# Patient Record
Sex: MALE | Race: WHITE | NOT HISPANIC OR LATINO | Employment: OTHER | URBAN - METROPOLITAN AREA
[De-identification: names, ages, dates, MRNs, and addresses within clinical notes are randomized per-mention and may not be internally consistent; named-entity substitution may affect disease eponyms.]

---

## 2017-01-09 ENCOUNTER — TELEPHONE (OUTPATIENT)
Dept: OTHER | Facility: CLINIC | Age: 72
End: 2017-01-09

## 2017-01-09 NOTE — TELEPHONE ENCOUNTER
Pt called having pain in left calf.  Similar pain as before, however more an ache.  Different from pre-procedure.  Concerned, worried that it may be vascular. Does a fair amount of walking, does seemed to worsen after walking a significant amount of time.  December ultrasound was normal.  Still on Plavix and ASA.  Will repeat imaging, if again it's normal will recommend musculoskeletal consult.  Will schedule within the week, and call patient with results.  Judy Sanz RN  IR nurse clinician

## 2017-01-10 NOTE — TELEPHONE ENCOUNTER
I called Gilbert to schedule his testing for this week. I offered him time on Wednesday and Thursday and he declined any options this week as he states he is already too busy.  He requested that I look at opening for next week.  I have him scheduled on Monday, January 16th, per his request. Jessica Navarro,

## 2017-01-16 ENCOUNTER — HOSPITAL ENCOUNTER (OUTPATIENT)
Dept: ULTRASOUND IMAGING | Facility: CLINIC | Age: 72
Discharge: HOME | End: 2017-01-16
Attending: RADIOLOGY | Admitting: RADIOLOGY
Payer: MEDICARE

## 2017-01-16 ENCOUNTER — TELEPHONE (OUTPATIENT)
Dept: OTHER | Facility: CLINIC | Age: 72
End: 2017-01-16

## 2017-01-16 DIAGNOSIS — I73.9 PAD (PERIPHERAL ARTERY DISEASE) (H): ICD-10-CM

## 2017-01-16 DIAGNOSIS — I73.9 PAD (PERIPHERAL ARTERY DISEASE) (H): Primary | ICD-10-CM

## 2017-01-16 PROCEDURE — 93924 LWR XTR VASC STDY BILAT: CPT

## 2017-01-16 NOTE — TELEPHONE ENCOUNTER
Called patient with results.  Normal study.  Recommend f/u in June 2017.  Will repeat imaging at see patient.  [facility] will call to schedule.  Pt agrees to plan.  Denies questions at this time.  Judy Sanz RN  IR nurse clinician  US LOWER EXTREMITY ARTERIAL DUPLEX LEFT  1/16/2017 11:05 AM       HISTORY:  71-year-old male status post angioplasty of the tibial  peroneal trunk.     COMPARISON: Angiogram dated 11/18/2016.     FINDINGS: Spectral waveform analysis was performed.       The tibial peroneal trunk is patent without evidence for significant  stenosis.     EUGENE BELL MD  Exam Information      Exam Date Exam Time Accession # Performing Department Results      1/16/17 11:05 AM [id number] [facility] Ultrasound        PACS Images      Show images for US LILLI Doppler with Exercise       Study Result      US LILLI DOPPLER WITH EXERCISE  1/16/2017 11:05 AM       HISTORY: s/p PTA left tibial peroneal trunk done on 11/18/2016 due to  life limiting left lower extremity claudication symptoms.     COMPARISON: Angiogram dated 11/18/2016, ankle-brachial indices dated  9/22/2016 and 12/7/2016.     FINDINGS:    The patient walked on a treadmill for 5 minutes at a 10% incline and  at a speed of 2.0 miles per hour     Patient did not experience lower extremity claudication symptoms with  exercise.     The resting and exercise ABIs on the right are 1.19 and 1.28  respectively.     The resting and exercise ABIs on the left are  1.24 and 1.20  respectively.     Waveform analysis indicates multiphasic waveforms in the distal tibial  arteries.                                                                       IMPRESSION: Findings do not indicate significant lower extremity  arterial insufficiency at rest or with exercise.        EUGENE BELL MD

## 2017-02-16 ENCOUNTER — HOSPITAL ENCOUNTER (OUTPATIENT)
Facility: CLINIC | Age: 72
End: 2017-02-16
Attending: OPHTHALMOLOGY | Admitting: OPHTHALMOLOGY
Payer: MEDICARE

## 2017-05-15 ENCOUNTER — OFFICE VISIT (OUTPATIENT)
Dept: OTHER | Facility: CLINIC | Age: 72
End: 2017-05-15
Attending: RADIOLOGY
Payer: MEDICARE

## 2017-05-15 ENCOUNTER — HOSPITAL ENCOUNTER (OUTPATIENT)
Dept: ULTRASOUND IMAGING | Facility: CLINIC | Age: 72
End: 2017-05-15
Attending: RADIOLOGY
Payer: MEDICARE

## 2017-05-15 ENCOUNTER — HOSPITAL ENCOUNTER (OUTPATIENT)
Dept: ULTRASOUND IMAGING | Facility: CLINIC | Age: 72
Discharge: HOME | End: 2017-05-15
Attending: RADIOLOGY | Admitting: RADIOLOGY
Payer: MEDICARE

## 2017-05-15 VITALS — HEART RATE: 51 BPM | DIASTOLIC BLOOD PRESSURE: 80 MMHG | SYSTOLIC BLOOD PRESSURE: 126 MMHG

## 2017-05-15 DIAGNOSIS — I73.9 PAD (PERIPHERAL ARTERY DISEASE) (H): ICD-10-CM

## 2017-05-15 DIAGNOSIS — I73.9 PAD (PERIPHERAL ARTERY DISEASE) (H): Primary | ICD-10-CM

## 2017-05-15 PROCEDURE — 93926 LOWER EXTREMITY STUDY: CPT | Mod: LT

## 2017-05-15 PROCEDURE — 93924 LWR XTR VASC STDY BILAT: CPT

## 2017-05-15 NOTE — NURSING NOTE
"Chief Complaint   Patient presents with     RECHECK     Left leg arterial imaging and LILLI w/exercise (VHC 2:30; FAS 3:30) *s/p left tibial peroneal trunk PTA done on 11/18/2016 with Dr. Lundy. 6mo f/u from comparison study 1/16/2017 evaluate for pt to discontinue plavix       Initial /80 (BP Location: Left arm, Patient Position: Chair, Cuff Size: Adult Large)  Pulse 51 Estimated body mass index is 28.17 kg/(m^2) as calculated from the following:    Height as of 11/18/16: 5' 10\" (1.778 m).    Weight as of 11/18/16: 196 lb 4.8 oz (89 kg).  Medication Reconciliation: complete     Face to face nursing time: 8 minutes    Brittani Cortes MA     "

## 2017-05-15 NOTE — MR AVS SNAPSHOT
"              After Visit Summary   5/15/2017    Gilbert Ferrer    MRN: [id number]           Patient Information     Date Of Birth          1945        Visit Information        Provider Department      5/15/2017 3:30 PM James Lundy MD [facility]        Today's Diagnoses     PAD (peripheral artery disease) (H)    -  1       Follow-ups after your visit        Future tests that were ordered for you today     Open Future Orders        Priority Expected Expires Ordered    US LILLI Doppler with Exercise Routine 11/20/2017 5/16/2018 5/16/2017    US Lower Extremity Arterial Duplex Left Routine 11/20/2017 5/16/2018 5/16/2017            Who to contact     If you have questions or need follow up information about today's clinic visit or your schedule please contact [facility] directly at [phone].  Normal or non-critical lab and imaging results will be communicated to you by MyChart, letter or phone within 4 business days after the clinic has received the results. If you do not hear from us within 7 days, please contact the clinic through MyChart or phone. If you have a critical or abnormal lab result, we will notify you by phone as soon as possible.  Submit refill requests through [employer] or call your pharmacy and they will forward the refill request to us. Please allow 3 business days for your refill to be completed.          Additional Information About Your Visit        MyChart Information     [employer] lets you send messages to your doctor, view your test results, renew your prescriptions, schedule appointments and more. To sign up, go to [email or web address] . Click on \"Log in\" on the left side of the screen, which will take you to the Welcome page. Then click on \"Sign up Now\" on the right side of the page.     You will be asked to enter the access code listed below, as well as some personal information. Please follow the directions to create your " username and password.     Your access code is: [email or web address]  Expires: 2017  1:28 PM     Your access code will  in 90 days. If you need help or a new code, please call your Mobile clinic or [phone].        Care EveryWhere ID     This is your Care EveryWhere ID. This could be used by other organizations to access your Mobile medical records  [id number]        Your Vitals Were     Pulse                   51            Blood Pressure from Last 3 Encounters:   05/15/17 126/80   16 137/78   16 121/76    Weight from Last 3 Encounters:   16 196 lb 4.8 oz (89 kg)   16 193 lb 6 oz (87.7 kg)   02/15/16 192 lb (87.1 kg)               Primary Care Provider Office Phone # Fax #    Erica Calloway [phone]       GIANNA DONNELLY [address]  [facility] [address]        Thank you!     Thank you for choosing [facility]  for your care. Our goal is always to provide you with excellent care. Hearing back from our patients is one way we can continue to improve our services. Please take a few minutes to complete the written survey that you may receive in the mail after your visit with us. Thank you!             Your Updated Medication List - Protect others around you: Learn how to safely use, store and throw away your medicines at www.disposemymeds.org.          This list is accurate as of: 5/15/17 11:59 PM.  Always use your most recent med list.                   Brand Name Dispense Instructions for use    aspirin 81 MG EC tablet     90 tablet    Take 1 tablet (81 mg) by mouth daily       atorvastatin 40 MG tablet    LIPITOR    90 tablet    Take 1 tablet (40 mg) by mouth daily       clopidogrel 75 MG tablet    PLAVIX    90 tablet    Take 1 tablet (75 mg) by mouth daily Start taking medication the day after the procedure       pantoprazole 20 MG EC tablet    PROTONIX    90 tablet    Take by mouth 30-60 minutes before a meal.

## 2017-05-16 DIAGNOSIS — I73.9 PAD (PERIPHERAL ARTERY DISEASE) (H): Primary | ICD-10-CM

## 2017-05-16 NOTE — PATIENT INSTRUCTIONS
Repeat imaging and consult in 6 mo.  Start baby ASA 81mg.  Contact us sooner if sudden temperature change in foot, a sore develops or pain.   Increase walking tolerance.   Discontinue Plavix

## 2017-05-16 NOTE — PROGRESS NOTES
May 15, 2017       Erica Bennett    [address], [address]   [place] [address]      Dear Dr. Calloway,      I saw Gilbert [facility]ueger [facility] today.  He is a 71-year-old man who presented to us on 11/18/2016 due to left lower extremity claudication symptoms.  He underwent an angiogram.  On angiography, a significant stenosis was identified in the left tibioperoneal trunk.  This was treated with angioplasty.  No other significant lesions were identified.  The patient primarily had a single vessel runoff to the left foot via the posterior tibial artery.  Following the procedure, the patient was started on an antiplatelet therapy with Plavix.      Since the procedure, the patient has been doing well.  His claudication symptoms have completely resolved.      PHYSICAL EXAMINATION:  The posterior tibial pulse in the left foot is palpable.  The DP pulse is not palpable but dopplerable.      The foot is warm without evidence for acute ischemia.      Repeat ultrasound of the left lower extremity shows the posterior tibial artery and tibioperoneal trunk to be patent without evidence for significant stenosis.  Ankle brachial indices are in the range of normal bilaterally.      IMPRESSION AND PLAN:  Patient is 6 months status post angioplasty of the tibioperoneal trunk in the left leg due to claudication symptoms.  The patient's claudication symptoms have resolved and noninvasive imaging studies do not show significant arterial insufficiency in the left leg.      The patient continues to be on Plavix but has noticed some bruising with Plavix.  At this time, the Plavix will be stopped.  The patient will continue on antiplatelet therapy with the baby aspirin.      Repeat ankle brachial indices and ultrasound of the left leg will be performed in 1 year.      Thank you for allowing me to participate in the management of this patient.      Total time  spent discussing cares was 15 minutes.      Sincerely,         EUGENE BELL MD             D: 05/15/2017 16:57   T: 2017 10:18   MT: MADDIE      Name:     BARBRA PEREZ   MRN:      [id number]        Account:      [id number]   :      1945           Visit Date:   05/15/2017      Document: [id number]       cc: Erica Calloway MD

## 2017-06-28 ENCOUNTER — ANESTHESIA (OUTPATIENT)
Dept: SURGERY | Facility: CLINIC | Age: 72
End: 2017-06-28
Payer: MEDICARE

## 2017-06-28 ENCOUNTER — SURGERY (OUTPATIENT)
Age: 72
End: 2017-06-28

## 2017-06-28 ENCOUNTER — HOSPITAL ENCOUNTER (OUTPATIENT)
Facility: CLINIC | Age: 72
Discharge: HOME | End: 2017-06-28
Attending: OPHTHALMOLOGY | Admitting: OPHTHALMOLOGY
Payer: MEDICARE

## 2017-06-28 ENCOUNTER — ANESTHESIA EVENT (OUTPATIENT)
Dept: SURGERY | Facility: CLINIC | Age: 72
End: 2017-06-28
Payer: MEDICARE

## 2017-06-28 VITALS
HEART RATE: 71 BPM | TEMPERATURE: 97.5 F | OXYGEN SATURATION: 96 % | BODY MASS INDEX: 28 KG/M2 | DIASTOLIC BLOOD PRESSURE: 75 MMHG | WEIGHT: 195.55 LBS | SYSTOLIC BLOOD PRESSURE: 111 MMHG | HEIGHT: 70 IN | RESPIRATION RATE: 12 BRPM

## 2017-06-28 DIAGNOSIS — H25.10 SENILE NUCLEAR SCLEROSIS, UNSPECIFIED LATERALITY: Primary | ICD-10-CM

## 2017-06-28 PROCEDURE — 25000128 H RX IP 250 OP 636: Performed by: OPHTHALMOLOGY

## 2017-06-28 PROCEDURE — V2632 POST CHMBR INTRAOCULAR LENS: HCPCS | Performed by: OPHTHALMOLOGY

## 2017-06-28 PROCEDURE — 25000125 ZZHC RX 250: Performed by: NURSE ANESTHETIST, CERTIFIED REGISTERED

## 2017-06-28 PROCEDURE — 71000028 ZZH EYE RECOVERY PHASE 2 EACH 15 MINS: Performed by: OPHTHALMOLOGY

## 2017-06-28 PROCEDURE — 27210794 ZZH OR GENERAL SUPPLY STERILE: Performed by: OPHTHALMOLOGY

## 2017-06-28 PROCEDURE — 40000170 ZZH STATISTIC PRE-PROCEDURE ASSESSMENT II: Performed by: OPHTHALMOLOGY

## 2017-06-28 PROCEDURE — 25000128 H RX IP 250 OP 636: Performed by: ANESTHESIOLOGY

## 2017-06-28 PROCEDURE — 25000128 H RX IP 250 OP 636: Performed by: NURSE ANESTHETIST, CERTIFIED REGISTERED

## 2017-06-28 PROCEDURE — 36000101 ZZH EYE SURGERY LEVEL 3 1ST 30 MIN: Performed by: OPHTHALMOLOGY

## 2017-06-28 PROCEDURE — 25000125 ZZHC RX 250: Performed by: OPHTHALMOLOGY

## 2017-06-28 PROCEDURE — 37000008 ZZH ANESTHESIA TECHNICAL FEE, 1ST 30 MIN: Performed by: OPHTHALMOLOGY

## 2017-06-28 PROCEDURE — 25000125 ZZHC RX 250: Performed by: ANESTHESIOLOGY

## 2017-06-28 DEVICE — EYE IMP IOL AMO PCL TECNIS ZCB00 14.5: Type: IMPLANTABLE DEVICE | Site: EYE | Status: FUNCTIONAL

## 2017-06-28 RX ORDER — ONDANSETRON 4 MG/1
4 TABLET, ORALLY DISINTEGRATING ORAL EVERY 30 MIN PRN
Status: DISCONTINUED | OUTPATIENT
Start: 2017-06-28 | End: 2017-06-28 | Stop reason: HOSPADM

## 2017-06-28 RX ORDER — CYCLOPENTOLATE HYDROCHLORIDE 10 MG/ML
1 SOLUTION/ DROPS OPHTHALMIC
Status: COMPLETED | OUTPATIENT
Start: 2017-06-28 | End: 2017-06-28

## 2017-06-28 RX ORDER — FENTANYL CITRATE 50 UG/ML
25-50 INJECTION, SOLUTION INTRAMUSCULAR; INTRAVENOUS
Status: DISCONTINUED | OUTPATIENT
Start: 2017-06-28 | End: 2017-06-28 | Stop reason: HOSPADM

## 2017-06-28 RX ORDER — NALOXONE HYDROCHLORIDE 0.4 MG/ML
.1-.4 INJECTION, SOLUTION INTRAMUSCULAR; INTRAVENOUS; SUBCUTANEOUS
Status: DISCONTINUED | OUTPATIENT
Start: 2017-06-28 | End: 2017-06-28 | Stop reason: HOSPADM

## 2017-06-28 RX ORDER — PHENYLEPHRINE HYDROCHLORIDE 25 MG/ML
1 SOLUTION/ DROPS OPHTHALMIC
Status: COMPLETED | OUTPATIENT
Start: 2017-06-28 | End: 2017-06-28

## 2017-06-28 RX ORDER — SODIUM CHLORIDE, SODIUM LACTATE, POTASSIUM CHLORIDE, CALCIUM CHLORIDE 600; 310; 30; 20 MG/100ML; MG/100ML; MG/100ML; MG/100ML
INJECTION, SOLUTION INTRAVENOUS CONTINUOUS
Status: DISCONTINUED | OUTPATIENT
Start: 2017-06-28 | End: 2017-06-28 | Stop reason: HOSPADM

## 2017-06-28 RX ORDER — PROPARACAINE HYDROCHLORIDE 5 MG/ML
1 SOLUTION/ DROPS OPHTHALMIC ONCE
Status: DISCONTINUED | OUTPATIENT
Start: 2017-06-28 | End: 2017-06-28 | Stop reason: HOSPADM

## 2017-06-28 RX ORDER — LIDOCAINE HYDROCHLORIDE 10 MG/ML
INJECTION, SOLUTION EPIDURAL; INFILTRATION; INTRACAUDAL; PERINEURAL PRN
Status: DISCONTINUED | OUTPATIENT
Start: 2017-06-28 | End: 2017-06-28 | Stop reason: HOSPADM

## 2017-06-28 RX ORDER — TROPICAMIDE 10 MG/ML
1 SOLUTION/ DROPS OPHTHALMIC
Status: COMPLETED | OUTPATIENT
Start: 2017-06-28 | End: 2017-06-28

## 2017-06-28 RX ORDER — MEPERIDINE HYDROCHLORIDE 25 MG/ML
12.5 INJECTION INTRAMUSCULAR; INTRAVENOUS; SUBCUTANEOUS
Status: DISCONTINUED | OUTPATIENT
Start: 2017-06-28 | End: 2017-06-28 | Stop reason: HOSPADM

## 2017-06-28 RX ORDER — SODIUM CHLORIDE, SODIUM LACTATE, POTASSIUM CHLORIDE, CALCIUM CHLORIDE 600; 310; 30; 20 MG/100ML; MG/100ML; MG/100ML; MG/100ML
500 INJECTION, SOLUTION INTRAVENOUS CONTINUOUS
Status: DISCONTINUED | OUTPATIENT
Start: 2017-06-28 | End: 2017-06-28 | Stop reason: HOSPADM

## 2017-06-28 RX ORDER — BALANCED SALT SOLUTION 6.4; .75; .48; .3; 3.9; 1.7 MG/ML; MG/ML; MG/ML; MG/ML; MG/ML; MG/ML
SOLUTION OPHTHALMIC PRN
Status: DISCONTINUED | OUTPATIENT
Start: 2017-06-28 | End: 2017-06-28 | Stop reason: HOSPADM

## 2017-06-28 RX ORDER — PREDNISOLONE ACETATE 1 %
SUSPENSION, DROPS(FINAL DOSAGE FORM)(ML) OPHTHALMIC (EYE) PRN
Status: DISCONTINUED | OUTPATIENT
Start: 2017-06-28 | End: 2017-06-28 | Stop reason: HOSPADM

## 2017-06-28 RX ORDER — ONDANSETRON 2 MG/ML
4 INJECTION INTRAMUSCULAR; INTRAVENOUS EVERY 30 MIN PRN
Status: DISCONTINUED | OUTPATIENT
Start: 2017-06-28 | End: 2017-06-28 | Stop reason: HOSPADM

## 2017-06-28 RX ORDER — PROPARACAINE HYDROCHLORIDE 5 MG/ML
1 SOLUTION/ DROPS OPHTHALMIC ONCE
Status: COMPLETED | OUTPATIENT
Start: 2017-06-28 | End: 2017-06-28

## 2017-06-28 RX ADMIN — TROPICAMIDE 1 DROP: 10 SOLUTION/ DROPS OPHTHALMIC at 12:34

## 2017-06-28 RX ADMIN — DEXMEDETOMIDINE HYDROCHLORIDE 8 MCG: 100 INJECTION, SOLUTION INTRAVENOUS at 13:42

## 2017-06-28 RX ADMIN — DEXMEDETOMIDINE HYDROCHLORIDE 8 MCG: 100 INJECTION, SOLUTION INTRAVENOUS at 13:53

## 2017-06-28 RX ADMIN — PROPARACAINE HYDROCHLORIDE 1 DROP: 5 SOLUTION/ DROPS OPHTHALMIC at 12:34

## 2017-06-28 RX ADMIN — TROPICAMIDE 1 DROP: 10 SOLUTION/ DROPS OPHTHALMIC at 12:46

## 2017-06-28 RX ADMIN — MIDAZOLAM HYDROCHLORIDE 1 MG: 1 INJECTION, SOLUTION INTRAMUSCULAR; INTRAVENOUS at 13:49

## 2017-06-28 RX ADMIN — CYCLOPENTOLATE HYDROCHLORIDE 1 DROP: 10 SOLUTION/ DROPS OPHTHALMIC at 12:46

## 2017-06-28 RX ADMIN — BALANCED SALT SOLUTION 15 ML: 6.4; .75; .48; .3; 3.9; 1.7 SOLUTION OPHTHALMIC at 13:43

## 2017-06-28 RX ADMIN — LIDOCAINE HYDROCHLORIDE 1 ML: 10 INJECTION, SOLUTION EPIDURAL; INFILTRATION; INTRACAUDAL; PERINEURAL at 12:56

## 2017-06-28 RX ADMIN — PHENYLEPHRINE HYDROCHLORIDE 1 DROP: 2.5 SOLUTION/ DROPS OPHTHALMIC at 12:46

## 2017-06-28 RX ADMIN — PHENYLEPHRINE HYDROCHLORIDE 1 DROP: 2.5 SOLUTION/ DROPS OPHTHALMIC at 12:34

## 2017-06-28 RX ADMIN — LIDOCAINE HYDROCHLORIDE 1 ML: 10 INJECTION, SOLUTION EPIDURAL; INFILTRATION; INTRACAUDAL; PERINEURAL at 13:44

## 2017-06-28 RX ADMIN — CYCLOPENTOLATE HYDROCHLORIDE 1 DROP: 10 SOLUTION/ DROPS OPHTHALMIC at 12:40

## 2017-06-28 RX ADMIN — TROPICAMIDE 1 DROP: 10 SOLUTION/ DROPS OPHTHALMIC at 12:40

## 2017-06-28 RX ADMIN — SODIUM CHLORIDE, POTASSIUM CHLORIDE, SODIUM LACTATE AND CALCIUM CHLORIDE 500 ML: 600; 310; 30; 20 INJECTION, SOLUTION INTRAVENOUS at 12:56

## 2017-06-28 RX ADMIN — DEXMEDETOMIDINE HYDROCHLORIDE 8 MCG: 100 INJECTION, SOLUTION INTRAVENOUS at 13:44

## 2017-06-28 RX ADMIN — MIDAZOLAM HYDROCHLORIDE 2 MG: 1 INJECTION, SOLUTION INTRAMUSCULAR; INTRAVENOUS at 13:37

## 2017-06-28 RX ADMIN — PHENYLEPHRINE HYDROCHLORIDE 1 DROP: 2.5 SOLUTION/ DROPS OPHTHALMIC at 12:40

## 2017-06-28 RX ADMIN — EPINEPHRINE 500 ML: 1 INJECTION, SOLUTION, CONCENTRATE INTRAVENOUS at 13:43

## 2017-06-28 RX ADMIN — SODIUM CHONDROITIN SULFATE / SODIUM HYALURONATE 1 ML: 0.55-0.5 INJECTION INTRAOCULAR at 13:44

## 2017-06-28 RX ADMIN — DEXMEDETOMIDINE HYDROCHLORIDE 4 MCG: 100 INJECTION, SOLUTION INTRAVENOUS at 13:45

## 2017-06-28 RX ADMIN — Medication 1 DROP: at 13:58

## 2017-06-28 RX ADMIN — CYCLOPENTOLATE HYDROCHLORIDE 1 DROP: 10 SOLUTION/ DROPS OPHTHALMIC at 12:34

## 2017-06-28 RX ADMIN — MIDAZOLAM HYDROCHLORIDE 1 MG: 1 INJECTION, SOLUTION INTRAMUSCULAR; INTRAVENOUS at 13:46

## 2017-06-28 ASSESSMENT — LIFESTYLE VARIABLES: TOBACCO_USE: 1

## 2017-06-28 NOTE — ANESTHESIA PREPROCEDURE EVALUATION
Anesthesia Evaluation     .             ROS/MED HX    ENT/Pulmonary:     (+)sleep apnea, tobacco use, Past use doesn't use CPAP , . .    Neurologic:  - neg neurologic ROS     Cardiovascular:     (+) Dyslipidemia, -Peripheral Vascular Disease-- Other and Symptomatic, --. : . . . :. .       METS/Exercise Tolerance:     Hematologic:  - neg hematologic  ROS       Musculoskeletal:         GI/Hepatic:     (+) liver disease, Other GI/Hepatic Gilbert's dz     (-) GERD   Renal/Genitourinary:  - ROS Renal section negative       Endo:  - neg endo ROS       Psychiatric:         Infectious Disease:         Malignancy:         Other:                     Physical Exam  Normal systems: cardiovascular, pulmonary and dental    Airway   Mallampati: II  TM distance: >3 FB  Neck ROM: full    Dental     Cardiovascular       Pulmonary                     Anesthesia Plan      History & Physical Review  History and physical reviewed and following examination; no interval change.    ASA Status:  3 .    NPO Status:  > 8 hours    Plan for MAC Reason for MAC:  Procedure to face, neck, head or breast  PONV prophylaxis:  Ondansetron (or other 5HT-3)       Postoperative Care  Postoperative pain management:  IV analgesics.      Consents  Anesthetic plan, risks, benefits and alternatives discussed with:  Patient..          Procedure: Procedure(s):  PHACOEMULSIFICATION CLEAR CORNEA WITH STANDARD INTRAOCULAR LENS IMPLANT  Preop diagnosis: LEFT EYE CATARACT    Allergies   Allergen Reactions     Mold      Past Medical History:   Diagnosis Date     Elevated PSA      Gilbert's disease      Hyperlipidemia      Left rotator cuff tear      DAVID (obstructive sleep apnea)      PAD (peripheral artery disease) (H)      Sleep apnea     DOES NOT USE CPAP     Umbilical hernia      Vitamin D deficiency      Past Surgical History:   Procedure Laterality Date     HERNIORRHAPHY VENTRAL N/A 3/18/2016    Procedure: HERNIORRHAPHY VENTRAL;  Surgeon: Nicola Rivera,  MD;  Location: [facility]     Prior to Admission medications    Medication Sig Start Date End Date Taking? Authorizing Provider   Ibuprofen (ADVIL PO) Take 200 mg by mouth every 6 hours as needed for moderate pain   Yes Reported, Patient   aspirin 81 MG EC tablet Take 1 tablet (81 mg) by mouth daily 11/18/16  Yes Sherry Millard PA-C   atorvastatin (LIPITOR) 40 MG tablet Take 1 tablet (40 mg) by mouth daily 11/18/16  Yes Sherry Millard PA-C     Current Facility-Administered Medications Ordered in Epic   Medication Dose Route Frequency Last Rate Last Dose     proparacaine (ALCAINE) 0.5 % ophthalmic solution 1 drop  1 drop Ophthalmic Once         cyclopentolate (CYCLOGYL) 1 % ophthalmic solution 1 drop  1 drop Ophthalmic q5 Min Prior to Surgery   1 drop at 06/28/17 1240     phenylephrine (MYDFRIN /GABRIEL-SYNEPHRINE) 2.5 % ophthalmic solution 1 drop  1 drop Ophthalmic q5 Min Prior to Surgery   1 drop at 06/28/17 1240     tropicamide (MYDRIACYL) 1 % ophthalmic solution 1 drop  1 drop Ophthalmic q5 Min Prior to Surgery   1 drop at 06/28/17 1240     lidocaine (AKTEN) ophthalmic gel 0.5 mL  0.5 mL Ophthalmic Once         povidone-iodine 5 % ophthalmic solution 1 drop  1 drop Ophthalmic Once         lidocaine 1 % 1 mL  1 mL Other Q1H PRN         lactated ringers infusion  500 mL Intravenous Continuous         No current [facility]-ordered outpatient prescriptions on file.     Wt Readings from Last 1 Encounters:   06/28/17 88.7 kg (195 lb 8.8 oz)     Temp Readings from Last 1 Encounters:   06/28/17 36.4  C (97.5  F) (Temporal)     BP Readings from Last 6 Encounters:   06/28/17 (!) 141/97   05/15/17 126/80   11/18/16 137/78   09/22/16 121/76   03/18/16 (!) 144/93   02/15/16 157/87     Pulse Readings from Last 4 Encounters:   06/28/17 71   05/15/17 51   11/18/16 66   09/22/16 58     Resp Readings from Last 1 Encounters:   06/28/17 16     SpO2 Readings from Last 1 Encounters:   06/28/17 98%     Recent Labs   Lab Test  11/18/16    0856   CR  1.03     Recent Labs   Lab Test  11/18/16   0856   WBC  7.5   HGB  15.9   PLT  200     Recent Labs   Lab Test  11/18/16   0856   INR  0.95      RECENT LABS:   ECG:   ECHO:   CXR:                    .

## 2017-06-28 NOTE — IP AVS SNAPSHOT
MRN:[id number]                      After Visit Summary   6/28/2017    Gilbert Ferrer    MRN: [id number]           Thank you!     Thank you for choosing [facility] for your care. Our goal is always to provide you with excellent care. Hearing back from our patients is one way we can continue to improve our services. Please take a few minutes to complete the written survey that you may receive in the mail after you visit with us. Thank you!        Patient Information     Date Of Birth          1945        About your hospital stay     You were admitted on:  June 28, 2017 You last received care in the:  [facility]    You were discharged on:  June 28, 2017       Who to Call     For medical emergencies, please call 911.  For non-urgent questions about your medical care, please call your primary care provider or clinic, [phone]  For questions related to your surgery, please call your surgery clinic        Attending Provider     Provider Specialty    Yifan Arroyo MD Ophthalmology       Primary Care Provider Office Phone # Fax #    Erica Calloway [phone]      Your next 10 appointments already scheduled     Jul 26, 2017   Procedure with Yifan Arroyo MD   [facility] (--)    [address] [place] [address] [place], [address]  [facility] [address] [phone]              Further instructions from your care team       [facility] Discharge  Instructions  Anesthesia ([facility])   Adult Discharge Instructions    For 24 hours after surgery    1. Get plenty of rest.  Make arrangements to have a responsible adult stay with you for at least 6 hours after you leave the hospital.  2. Do not drive or use heavy equipment for 24 hours.    3. Do not drink alcohol for 24 hours.  4. Do not sign legal documents or make important decisions for 24 hours.  5. Avoid strenuous or risky activities. You may feel  "lightheaded.  If so, sit for a few minutes before standing.  Have someone help you get up.   6. Conscious sedation patients may resume a regular diet..  7. Any questions of medical nature, call your physician.    [facility]  Cataract Surgery Discharge Instructions  [facility] MD GALE Castellanos MD J. Hasan, MD C. Nichols, MD J. O'Neill, MD S. Schaefer, MD J. Stephens, MD        Start using drops when you arrive at home today    Vigamox or Ofloxacin (Tan top) - one drop in surgical eye 3 times per day until gone.    Prednisolone acetate 1.0% (pink or white top) - one drop in surgical eye 3 times per day until gone.    Ketorolac or Diclofenac (Grey top) - one drop in surgical eye 3 times per day until gone.             Place shield over surgical eye at bedtime for 3 nights.      The eye will feel itchy, scratchy, and vision will be blurred, you may take Tylenol for the scratchy feeling if this is bothersome.      No eye rubbing or swimming for I week.      You may resume all prescription medications as directed by your primary doctor.      Call if increasing pain, progressively worsening vision or worsening redness of surgical eye.      On-call doctor can be reached at [phone].    Pending Results     No orders found from 6/26/2017 to 6/29/2017.            Admission Information     Date & Time Provider Department Dept. Phone    6/28/2017 Yifan Arroyo MD [facility] [phone]      Your Vitals Were     Blood Pressure Pulse Temperature Respirations Height Weight    127/80 71 97.5  F (36.4  C) (Temporal) 12 1.772 m (5' 9.76\") 88.7 kg (195 lb 8.8 oz)    Pulse Oximetry BMI (Body Mass Index)                95% 28.25 kg/m2          MyChart Information     [employer] lets you send messages to your doctor, view your test results, renew your prescriptions, schedule appointments and more. To sign up, go to " "[email or web address] . Click on \"Log in\" on the left side of the screen, which will take you to the Welcome page. Then click on \"Sign up Now\" on the right side of the page.     You will be asked to enter the access code listed below, as well as some personal information. Please follow the directions to create your username and password.     Your access code is: [email or web address]  Expires: 2017  1:28 PM     Your access code will  in 90 days. If you need help or a new code, please call your [facility] clinic or [phone].        Care EveryWhere ID     This is your Care EveryWhere ID. This could be used by other organizations to access your [facility] medical records  [id number]        Equal Access to Services     AYLIN BRAR : Noel Adams, nathan ugarte, sin harvey, jeff christopher . So [facility] [phone].    ATENCIÓN: Si habla español, tiene a ball disposición servicios gratuitos de asistencia lingüística. Flavio al [phone].    We comply with applicable federal civil rights laws and [place] laws. We do not discriminate on the basis of race, color, national origin, age, disability sex, sexual orientation or gender identity.               Review of your medicines      CONTINUE these medicines which have NOT CHANGED        Dose / Directions    ADVIL PO        Dose:  200 mg   Take 200 mg by mouth every 6 hours as needed for moderate pain   Refills:  0       aspirin 81 MG EC tablet   Used for:  Atherosclerosis of native artery of left lower extremity with intermittent claudication (H)        Dose:  81 mg   Take 1 tablet (81 mg) by mouth daily   Quantity:  90 tablet   Refills:  3       atorvastatin 40 MG tablet   Commonly known as:  LIPITOR   Used for:  Hyperlipidemia LDL goal <70        Dose:  40 mg   Take 1 tablet (40 mg) by mouth daily   Quantity:  90 tablet   Refills:  3                Protect others around you: Learn how to safely use, store and " throw away your medicines at www.disposemymeds.org.             Medication List: This is a list of all your medications and when to take them. Check marks below indicate your daily home schedule. Keep this list as a reference.      Medications           Morning Afternoon Evening Bedtime As Needed    ADVIL PO   Take 200 mg by mouth every 6 hours as needed for moderate pain                                aspirin 81 MG EC tablet   Take 1 tablet (81 mg) by mouth daily                                atorvastatin 40 MG tablet   Commonly known as:  LIPITOR   Take 1 tablet (40 mg) by mouth daily

## 2017-06-28 NOTE — IP AVS SNAPSHOT
[facility]    [address]    [place] [address]    Phone:  [phone]    Fax:  [phone]                                       After Visit Summary   6/28/2017    Gilbert Ferrer    MRN: [id number]           After Visit Summary Signature Page     I have received my discharge instructions, and my questions have been answered. I have discussed any challenges I see with this plan with the nurse or doctor.    ..........................................................................................................................................  Patient/Patient Representative Signature      ..........................................................................................................................................  Patient Representative Print Name and Relationship to Patient    ..................................................               ................................................  Date                                            Time    ..........................................................................................................................................  Reviewed by Signature/Title    ...................................................              ..............................................  Date                                                            Time

## 2017-06-28 NOTE — ANESTHESIA POSTPROCEDURE EVALUATION
Patient: Gilbert Ferrer    Procedure(s):  LEFT EYE PHACOEMULSIFICATION CLEAR CORNEA WITH STANDARD INTRAOCULAR LENS IMPLANT  - Wound Class: I-Clean    Diagnosis:LEFT EYE CATARACT  Diagnosis Additional Information: No value filed.    Anesthesia Type:  MAC    Note:  Anesthesia Post Evaluation    Patient location during evaluation: PACU  Patient participation: Able to fully participate in evaluation  Level of consciousness: awake  Pain management: adequate  Airway patency: patent  Cardiovascular status: acceptable  Respiratory status: acceptable  Hydration status: acceptable  PONV: none     Anesthetic complications: None          Last vitals:  Vitals:    06/28/17 1243 06/28/17 1402 06/28/17 1418   BP: (!) 141/97 127/80 111/75   Pulse: 71     Resp: 16 12 12   Temp: 36.4  C (97.5  F)     SpO2: 98% 95% 96%         Electronically Signed By: OMAR RIOS MD  June 28, 2017  2:29 PM

## 2017-06-28 NOTE — OP NOTE
PREOPERATIVE DIAGNOSIS: Visually significant cataract, Left eye   POSTOPERATIVE DIAGNOSIS: Same   PROCEDURES:   1. Cataract extraction with intraocular lens implant Left eye.  SURGEON: Yifan Arroyo M.D.  INDICATIONS: The patient Gilbert Ferrer presented to the eye clinic with decreased vision secondary to cataract in the Left eye. The risks, including, but not limited to infection, loss of vision, loss of eye, need for more surgery, and bleeding, along with the benefits, alternatives, expectations, and the procedure itself were discussed at length with the patient who wished to proceed with surgery. All questions were answered to the patient's satisfaction.  DESCRIPTION OF PROCEDURE:   Prior to the procedure, appropriate cardiac and respiratory monitors were applied to the patient.  In the pre-operative holding area, a drop of topical tetracaine followed by povidone iodine followed by lidocaine gel.  The patient was brought to the operating room where a surgical pause was carried out to identify with all members of the surgical team the correct surgical site.  With adequate anesthesia, the Left eye was prepped and draped in the usual sterile fashion. A lid speculum was placed, and the operating microscope was rotated into position. A paracentesis was created.  Through this limbal paracentesis, the anterior chamber was filled with preservative-free lidocaine followed by viscoelastic.   A temporal clear corneal incision was created at the limbus using a 2.6 mm blade. A capsulorrhexis was initiated using a cystotome and was completed in continuous and circular fashion using the capsulorrhexis forceps. The lens nucleus was hydrodissected using balanced salt solution.  The lens nucleus was rotated and removed using phacoemulsification in a stop and chop technique.  Residual cortical material was removed using irrigation-aspiration.  The capsular bag was reinflated to its maximal extent with cohesive viscoelastic.  A  14.5 diopter ZCBOO was inserted into the capsular bag and noted to be well centered.  The lens power selected was reviewed using the intraocular lens power measurements that were obtained preoperatively to confirm that the correct lens was selected for the desired post-operative refractive state. The residual viscoelastic was aspirated. The anterior chamber was inflated with balanced salt solution and the wounds were hydrated and found to be self-sealing.  The eye was palpated and found to be of normal physiologic pressure. The lid speculum was removed. One drop of antibiotic and prednisolone were instilled in the eye and a clear shield placed over the eye. The patient tolerated the procedure well and there were no intraoperative complications.      PLAN: The patient will be discharged to home and will follow up tomorrow in the eye clinic.  EBL:  None  Complications:  None    Implant Name Type Inv. Item Serial No.  Lot No. LRB No. Used   EYE IMP IOL TERRY PCL TECNIS ZCB00 14.5 Lens/Eye Implant EYE IMP IOL TERRY PCL TECNIS ZCB00 14.5 [id number] [employer]   Left 1

## 2017-06-28 NOTE — OR NURSING
Ok to discharge patient when ready per Dr. Sonido Almendarez.  Called to let MD know patient c/o of restless legs and tremors. It was reported that he was that way in the OR also.  No orders or interventions.  Call MD if anything changes before discharge.

## 2017-06-28 NOTE — DISCHARGE INSTRUCTIONS
[facility] Anesthesia Eye Care Center Discharge  Instructions  Anesthesia (Eye Care Center)   Adult Discharge Instructions    For 24 hours after surgery    1. Get plenty of rest.  Make arrangements to have a responsible adult stay with you for at least 6 hours after you leave the hospital.  2. Do not drive or use heavy equipment for 24 hours.    3. Do not drink alcohol for 24 hours.  4. Do not sign legal documents or make important decisions for 24 hours.  5. Avoid strenuous or risky activities. You may feel lightheaded.  If so, sit for a few minutes before standing.  Have someone help you get up.   6. Conscious sedation patients may resume a regular diet..  7. Any questions of medical nature, call your physician.    [facility]  Cataract Surgery Discharge Instructions  [facility] MD GALE Castellanos MD J. Hasan, MD C. Nichols, MD J. O'Neill, MD S. Schaefer, MD J. Stephens, MD        Start using drops when you arrive at home today    Vigamox or Ofloxacin (Tan top) - one drop in surgical eye 3 times per day until gone.    Prednisolone acetate 1.0% (pink or white top) - one drop in surgical eye 3 times per day until gone.    Ketorolac or Diclofenac (Grey top) - one drop in surgical eye 3 times per day until gone.             Place shield over surgical eye at bedtime for 3 nights.      The eye will feel itchy, scratchy, and vision will be blurred, you may take Tylenol for the scratchy feeling if this is bothersome.      No eye rubbing or swimming for I week.      You may resume all prescription medications as directed by your primary doctor.      Call if increasing pain, progressively worsening vision or worsening redness of surgical eye.      On-call doctor can be reached at [phone].

## 2017-06-28 NOTE — ANESTHESIA CARE TRANSFER NOTE
Patient: Gilbert Ferrer    Procedure(s):  LEFT EYE PHACOEMULSIFICATION CLEAR CORNEA WITH STANDARD INTRAOCULAR LENS IMPLANT  - Wound Class: I-Clean    Diagnosis: LEFT EYE CATARACT  Diagnosis Additional Information: No value filed.    Anesthesia Type:   MAC     Note:  Airway :Room Air  Patient transferred to:Phase II  Comments: To recovery area, awake, somewhat restless, and stable.      Vitals: (Last set prior to Anesthesia Care Transfer)    CRNA VITALS  6/28/2017 1332 - 6/28/2017 1407      6/28/2017             Resp Rate (set): 10                Electronically Signed By: AYE Ledezma CRNA  June 28, 2017  2:07 PM

## 2017-07-26 ENCOUNTER — ANESTHESIA EVENT (OUTPATIENT)
Dept: SURGERY | Facility: CLINIC | Age: 72
End: 2017-07-26
Payer: MEDICARE

## 2017-07-26 ENCOUNTER — HOSPITAL ENCOUNTER (OUTPATIENT)
Facility: CLINIC | Age: 72
Discharge: HOME | End: 2017-07-26
Attending: OPHTHALMOLOGY | Admitting: OPHTHALMOLOGY
Payer: MEDICARE

## 2017-07-26 ENCOUNTER — SURGERY (OUTPATIENT)
Age: 72
End: 2017-07-26

## 2017-07-26 ENCOUNTER — ANESTHESIA (OUTPATIENT)
Dept: SURGERY | Facility: CLINIC | Age: 72
End: 2017-07-26
Payer: MEDICARE

## 2017-07-26 VITALS
DIASTOLIC BLOOD PRESSURE: 83 MMHG | HEART RATE: 54 BPM | OXYGEN SATURATION: 93 % | HEIGHT: 70 IN | BODY MASS INDEX: 28 KG/M2 | WEIGHT: 195.55 LBS | SYSTOLIC BLOOD PRESSURE: 124 MMHG | TEMPERATURE: 97.4 F | RESPIRATION RATE: 16 BRPM

## 2017-07-26 PROCEDURE — 40000170 ZZH STATISTIC PRE-PROCEDURE ASSESSMENT II: Performed by: OPHTHALMOLOGY

## 2017-07-26 PROCEDURE — 37000009 ZZH ANESTHESIA TECHNICAL FEE, EACH ADDTL 15 MIN: Performed by: OPHTHALMOLOGY

## 2017-07-26 PROCEDURE — 25000128 H RX IP 250 OP 636: Performed by: ANESTHESIOLOGY

## 2017-07-26 PROCEDURE — 25000125 ZZHC RX 250: Performed by: OPHTHALMOLOGY

## 2017-07-26 PROCEDURE — V2632 POST CHMBR INTRAOCULAR LENS: HCPCS | Performed by: OPHTHALMOLOGY

## 2017-07-26 PROCEDURE — 36000101 ZZH EYE SURGERY LEVEL 3 1ST 30 MIN: Performed by: OPHTHALMOLOGY

## 2017-07-26 PROCEDURE — 37000008 ZZH ANESTHESIA TECHNICAL FEE, 1ST 30 MIN: Performed by: OPHTHALMOLOGY

## 2017-07-26 PROCEDURE — 25000128 H RX IP 250 OP 636: Performed by: OPHTHALMOLOGY

## 2017-07-26 PROCEDURE — 36000102 ZZH EYE SURGERY LEVEL 3 EA 15 ADDTL MIN: Performed by: OPHTHALMOLOGY

## 2017-07-26 PROCEDURE — 27210794 ZZH OR GENERAL SUPPLY STERILE: Performed by: OPHTHALMOLOGY

## 2017-07-26 PROCEDURE — 25000125 ZZHC RX 250: Performed by: ANESTHESIOLOGY

## 2017-07-26 PROCEDURE — 25000128 H RX IP 250 OP 636: Performed by: NURSE ANESTHETIST, CERTIFIED REGISTERED

## 2017-07-26 PROCEDURE — 71000028 ZZH EYE RECOVERY PHASE 2 EACH 15 MINS: Performed by: OPHTHALMOLOGY

## 2017-07-26 PROCEDURE — 25000125 ZZHC RX 250: Performed by: NURSE ANESTHETIST, CERTIFIED REGISTERED

## 2017-07-26 DEVICE — EYE IMP IOL AMO PCL TECNIS ZCB00 15.0: Type: IMPLANTABLE DEVICE | Site: EYE | Status: FUNCTIONAL

## 2017-07-26 RX ORDER — CYCLOPENTOLATE HYDROCHLORIDE 10 MG/ML
1 SOLUTION/ DROPS OPHTHALMIC
Status: COMPLETED | OUTPATIENT
Start: 2017-07-26 | End: 2017-07-26

## 2017-07-26 RX ORDER — SODIUM CHLORIDE, SODIUM LACTATE, POTASSIUM CHLORIDE, CALCIUM CHLORIDE 600; 310; 30; 20 MG/100ML; MG/100ML; MG/100ML; MG/100ML
500 INJECTION, SOLUTION INTRAVENOUS CONTINUOUS
Status: DISCONTINUED | OUTPATIENT
Start: 2017-07-26 | End: 2017-07-26 | Stop reason: HOSPADM

## 2017-07-26 RX ORDER — LIDOCAINE HYDROCHLORIDE 20 MG/ML
INJECTION, SOLUTION INFILTRATION; PERINEURAL PRN
Status: DISCONTINUED | OUTPATIENT
Start: 2017-07-26 | End: 2017-07-26

## 2017-07-26 RX ORDER — LIDOCAINE HYDROCHLORIDE 10 MG/ML
INJECTION, SOLUTION EPIDURAL; INFILTRATION; INTRACAUDAL; PERINEURAL PRN
Status: DISCONTINUED | OUTPATIENT
Start: 2017-07-26 | End: 2017-07-26 | Stop reason: HOSPADM

## 2017-07-26 RX ORDER — ONDANSETRON 2 MG/ML
INJECTION INTRAMUSCULAR; INTRAVENOUS PRN
Status: DISCONTINUED | OUTPATIENT
Start: 2017-07-26 | End: 2017-07-26

## 2017-07-26 RX ORDER — PREDNISOLONE ACETATE 1 %
SUSPENSION, DROPS(FINAL DOSAGE FORM)(ML) OPHTHALMIC (EYE) PRN
Status: DISCONTINUED | OUTPATIENT
Start: 2017-07-26 | End: 2017-07-26 | Stop reason: HOSPADM

## 2017-07-26 RX ORDER — PHENYLEPHRINE HYDROCHLORIDE 25 MG/ML
1 SOLUTION/ DROPS OPHTHALMIC
Status: COMPLETED | OUTPATIENT
Start: 2017-07-26 | End: 2017-07-26

## 2017-07-26 RX ORDER — TROPICAMIDE 10 MG/ML
1 SOLUTION/ DROPS OPHTHALMIC
Status: COMPLETED | OUTPATIENT
Start: 2017-07-26 | End: 2017-07-26

## 2017-07-26 RX ORDER — PROPOFOL 10 MG/ML
INJECTION, EMULSION INTRAVENOUS PRN
Status: DISCONTINUED | OUTPATIENT
Start: 2017-07-26 | End: 2017-07-26

## 2017-07-26 RX ORDER — PROPARACAINE HYDROCHLORIDE 5 MG/ML
1 SOLUTION/ DROPS OPHTHALMIC ONCE
Status: COMPLETED | OUTPATIENT
Start: 2017-07-26 | End: 2017-07-26

## 2017-07-26 RX ORDER — FENTANYL CITRATE 50 UG/ML
INJECTION, SOLUTION INTRAMUSCULAR; INTRAVENOUS PRN
Status: DISCONTINUED | OUTPATIENT
Start: 2017-07-26 | End: 2017-07-26

## 2017-07-26 RX ORDER — BALANCED SALT SOLUTION 6.4; .75; .48; .3; 3.9; 1.7 MG/ML; MG/ML; MG/ML; MG/ML; MG/ML; MG/ML
SOLUTION OPHTHALMIC PRN
Status: DISCONTINUED | OUTPATIENT
Start: 2017-07-26 | End: 2017-07-26 | Stop reason: HOSPADM

## 2017-07-26 RX ADMIN — LIDOCAINE HYDROCHLORIDE 50 MG: 20 INJECTION, SOLUTION INFILTRATION; PERINEURAL at 07:39

## 2017-07-26 RX ADMIN — LIDOCAINE HYDROCHLORIDE 1 ML: 10 INJECTION, SOLUTION EPIDURAL; INFILTRATION; INTRACAUDAL; PERINEURAL at 07:00

## 2017-07-26 RX ADMIN — FENTANYL CITRATE 25 MCG: 50 INJECTION, SOLUTION INTRAMUSCULAR; INTRAVENOUS at 07:38

## 2017-07-26 RX ADMIN — LIDOCAINE HYDROCHLORIDE 1 ML: 10 INJECTION, SOLUTION EPIDURAL; INFILTRATION; INTRACAUDAL; PERINEURAL at 07:58

## 2017-07-26 RX ADMIN — TROPICAMIDE 1 DROP: 10 SOLUTION/ DROPS OPHTHALMIC at 06:50

## 2017-07-26 RX ADMIN — CYCLOPENTOLATE HYDROCHLORIDE 1 DROP: 10 SOLUTION/ DROPS OPHTHALMIC at 06:50

## 2017-07-26 RX ADMIN — PROPARACAINE HYDROCHLORIDE 1 DROP: 5 SOLUTION/ DROPS OPHTHALMIC at 06:43

## 2017-07-26 RX ADMIN — FENTANYL CITRATE 25 MCG: 50 INJECTION, SOLUTION INTRAMUSCULAR; INTRAVENOUS at 07:41

## 2017-07-26 RX ADMIN — EPINEPHRINE 500 ML: 1 INJECTION, SOLUTION, CONCENTRATE INTRAVENOUS at 07:57

## 2017-07-26 RX ADMIN — SODIUM CHONDROITIN SULFATE / SODIUM HYALURONATE 1 ML: 0.55-0.5 INJECTION INTRAOCULAR at 07:58

## 2017-07-26 RX ADMIN — TROPICAMIDE 1 DROP: 10 SOLUTION/ DROPS OPHTHALMIC at 06:43

## 2017-07-26 RX ADMIN — PROPOFOL 30 MG: 10 INJECTION, EMULSION INTRAVENOUS at 07:41

## 2017-07-26 RX ADMIN — ONDANSETRON 4 MG: 2 INJECTION INTRAMUSCULAR; INTRAVENOUS at 08:04

## 2017-07-26 RX ADMIN — CYCLOPENTOLATE HYDROCHLORIDE 1 DROP: 10 SOLUTION/ DROPS OPHTHALMIC at 06:43

## 2017-07-26 RX ADMIN — MIDAZOLAM HYDROCHLORIDE 2 MG: 1 INJECTION, SOLUTION INTRAMUSCULAR; INTRAVENOUS at 07:37

## 2017-07-26 RX ADMIN — PHENYLEPHRINE HYDROCHLORIDE 1 DROP: 2.5 SOLUTION/ DROPS OPHTHALMIC at 06:43

## 2017-07-26 RX ADMIN — Medication 4.5 ML GIVEN: at 08:10

## 2017-07-26 RX ADMIN — TROPICAMIDE 1 DROP: 10 SOLUTION/ DROPS OPHTHALMIC at 06:58

## 2017-07-26 RX ADMIN — Medication 1 DROP: at 06:43

## 2017-07-26 RX ADMIN — BALANCED SALT SOLUTION 15 ML: 6.4; .75; .48; .3; 3.9; 1.7 SOLUTION OPHTHALMIC at 07:57

## 2017-07-26 RX ADMIN — PHENYLEPHRINE HYDROCHLORIDE 1 DROP: 2.5 SOLUTION/ DROPS OPHTHALMIC at 06:58

## 2017-07-26 RX ADMIN — CYCLOPENTOLATE HYDROCHLORIDE 1 DROP: 10 SOLUTION/ DROPS OPHTHALMIC at 06:58

## 2017-07-26 RX ADMIN — PHENYLEPHRINE HYDROCHLORIDE 1 DROP: 2.5 SOLUTION/ DROPS OPHTHALMIC at 06:50

## 2017-07-26 RX ADMIN — SODIUM CHLORIDE, POTASSIUM CHLORIDE, SODIUM LACTATE AND CALCIUM CHLORIDE 500 ML: 600; 310; 30; 20 INJECTION, SOLUTION INTRAVENOUS at 07:00

## 2017-07-26 RX ADMIN — Medication 1 DROP: at 08:05

## 2017-07-26 RX ADMIN — DEXMEDETOMIDINE HYDROCHLORIDE 10 MCG: 100 INJECTION, SOLUTION INTRAVENOUS at 07:53

## 2017-07-26 ASSESSMENT — LIFESTYLE VARIABLES: TOBACCO_USE: 1

## 2017-07-26 NOTE — OP NOTE
PREOPERATIVE DIAGNOSIS: Visually significant cataract, Right eye   POSTOPERATIVE DIAGNOSIS: Same   ANESTHESIA: MAC with retrobulbar block  PROCEDURES:   1. Cataract extraction with intraocular lens implant Right eye.  SURGEON: Yifan Arroyo M.D.  INDICATIONS: The patient Gilbert Ferrer presented to the eye clinic with decreased vision secondary to cataract in the Right eye. The risks, including, but not limited to infection, loss of vision, loss of eye, need for more surgery, and bleeding, along with the benefits, alternatives, expectations, and the procedure itself were discussed at length with the patient who wished to proceed with surgery. All questions were answered to the patient's satisfaction.  DESCRIPTION OF PROCEDURE:   Prior to the procedure, appropriate cardiac and respiratory monitors were applied to the patient.  In the pre-operative holding area, under monitored anesthesia care, a retrobulbar injection of 2% lidocaine with hyaluronidase was given.  The patient was brought to the operating room where a surgical pause was carried out to identify with all members of the surgical team the correct surgical site.  With adequate anesthesia and akinesia, the Right eye was prepped and draped in the usual sterile fashion. A lid speculum was placed, and the operating microscope was rotated into position. A paracentesis was created.  Through this limbal paracentesis, the anterior chamber was filled with preservative-free lidocaine followed by viscoelastic.   A temporal clear corneal incision was created at the limbus using a 2.6 mm blade. A capsulorrhexis was initiated using a cystotome and was completed in continuous and circular fashion using the capsulorrhexis forceps. The lens nucleus was hydrodissected using balanced salt solution.  The lens nucleus was rotated and removed using phacoemulsification in a stop and chop technique.  Residual cortical material was removed using irrigation-aspiration.  The  capsular bag was reinflated to its maximal extent with cohesive viscoelastic.  A 15.0 diopter ZCBOO was inserted into the capsular bag and noted to be well centered.  The lens power selected was reviewed using the intraocular lens power measurements that were obtained preoperatively to confirm that the correct lens was selected for the desired post-operative refractive state. The residual viscoelastic was aspirated. The anterior chamber was inflated with balanced salt solution and the wounds were hydrated and found to be self-sealing.  The eye was palpated and found to be of normal physiologic pressure. The lid speculum was removed. Antibiotic drop and prednisolone was placed onto the eye and an eye pad and clear shield placed over the eye. The patient tolerated the procedure well and there were no intraoperative complications.      PLAN: The patient will be discharged to home and will follow up tomorrow in the eye clinic.  EBL:  None  Complications:  None    Implant Name Type Inv. Item Serial No.  Lot No. LRB No. Used   EYE IMP IOL TERRY PCL TECNIS ZCB00 15.0 Lens/Eye Implant EYE IMP IOL TERRY PCL TECNIS ZCB00 15.0 [id number] [employer]   Right 1

## 2017-07-26 NOTE — ANESTHESIA POSTPROCEDURE EVALUATION
Patient: Gilbert Ferrer    Procedure(s):  RIGHT EYE PHACOEMULSIFICATION CLEAR CORNEA WITH STANDARD INTRAOCULAR LENS IMPLANT  - Wound Class: I-Clean    Diagnosis:RIGHT EYE CATARACT  Diagnosis Additional Information: No value filed.    Anesthesia Type:  MAC    Note:  Anesthesia Post Evaluation    Patient location during evaluation: PACU  Patient participation: Able to fully participate in evaluation  Level of consciousness: awake  Pain management: adequate  Airway patency: patent  Cardiovascular status: acceptable  Hydration status: acceptable  PONV: none     Anesthetic complications: None          Last vitals:  Vitals:    07/26/17 0646 07/26/17 0811 07/26/17 0826   BP: 139/85 118/75 124/83   Pulse: 54     Resp: 16 16    Temp: 36.3  C (97.4  F)     SpO2: 98% 94% 93%         Electronically Signed By: Albaro Barakat MD  July 26, 2017  2:24 PM

## 2017-07-26 NOTE — IP AVS SNAPSHOT
[facility]    [address]    [place] [address]    Phone:  [phone]    Fax:  [phone]                                       After Visit Summary   7/26/2017    Gilbert Ferrer    MRN: [id number]           After Visit Summary Signature Page     I have received my discharge instructions, and my questions have been answered. I have discussed any challenges I see with this plan with the nurse or doctor.    ..........................................................................................................................................  Patient/Patient Representative Signature      ..........................................................................................................................................  Patient Representative Print Name and Relationship to Patient    ..................................................               ................................................  Date                                            Time    ..........................................................................................................................................  Reviewed by Signature/Title    ...................................................              ..............................................  Date                                                            Time

## 2017-07-26 NOTE — ANESTHESIA CARE TRANSFER NOTE
Patient: Gilbert Ferrer    Procedure(s):  RIGHT EYE PHACOEMULSIFICATION CLEAR CORNEA WITH STANDARD INTRAOCULAR LENS IMPLANT  - Wound Class: I-Clean    Diagnosis: RIGHT EYE CATARACT  Diagnosis Additional Information: No value filed.    Anesthesia Type:   MAC     Note:  Airway :Room Air  Patient transferred to:PACU  Comments: VSS      Vitals: (Last set prior to Anesthesia Care Transfer)    CRNA VITALS  7/26/2017 0740 - 7/26/2017 0813      7/26/2017             Pulse: 55    Ht Rate: 55    SpO2: 97 %    Resp Rate (set): 10                Electronically Signed By: AYE Gaytan CRNA  July 26, 2017  8:13 AM

## 2017-07-26 NOTE — DISCHARGE INSTRUCTIONS
[facility] Anesthesia Eye Care Center Discharge  Instructions  Anesthesia (Eye Care Center)   Adult Discharge Instructions (General)    For 24 hours after surgery    1. Get plenty of rest.  Make arrangements to have a responsible adult stay with you for at least 24 hours.  2. Do not drive or use heavy equipment for 24 hours.    3. Do not drink alcohol for 24 hours.  4. Do not sign legal documents or make important decisions for 24 hours.  5. Avoid strenuous or risky activities. You may feel lightheaded.  If so, sit for a few minutes before standing.  Have someone help you get up.   6. General anesthesia patients should drink only clear liquids (apple juice, ginger ale, broth or 7-Up). Be sure to drink enough fluids.  Move to a regular diet as you feel able.  7. Any questions of medical nature, call your physician.      [facility]  Cataract Surgery Discharge Instructions  [facility] MD GALE Castellanos MD J. Hasan, MD C. Nichols, MD J. O'Neill, MD S. Schaefer, MD J. Stephens, MD        Start using drops at noon today after you remove the patch.    Ofloxacin (Tan top) - one drop in surgical eye 3 times  per day until gone.    Prednisolone acetate 1.0% (pink or white top) - one drop in surgical eye 3 times per day until gone.    Ketorolac (Grey top) - one drop in surgical eye 3 times per day until gone.        Place shield over surgical eye at bedtime for 3 nights.      The eye will feel itchy, scratchy, and vision will be blurred, you may take Tylenol for the scratchy feeling if this is bothersome.      No eye rubbing or swimming for I week.      You may resume all prescription medications as directed by your primary doctor.      Call if increasing pain, progressively worsening vision or worsening redness of surgical eye.      On-call doctor can be reached at [phone].

## 2017-07-26 NOTE — ANESTHESIA PREPROCEDURE EVALUATION
"Procedure: Procedure(s):  PHACOEMULSIFICATION CLEAR CORNEA WITH STANDARD INTRAOCULAR LENS IMPLANT  Preop diagnosis: RIGHT EYE CATARACT  Allergies   Allergen Reactions     Mold      There is no problem list on file for this patient.    Past Medical History:   Diagnosis Date     Elevated PSA      Gilbert's disease      Hyperlipidemia      Left rotator cuff tear      DAVID (obstructive sleep apnea)      PAD (peripheral artery disease) (H)      Sleep apnea     DOES NOT USE CPAP     Spermatocele     Bilateral     Stented coronary artery      Umbilical hernia      Vitamin D deficiency      Vitamin D deficiency      Past Surgical History:   Procedure Laterality Date     CARDIAC SURGERY      STENT     COLONOSCOPY       HERNIORRHAPHY VENTRAL N/A 3/18/2016    Procedure: HERNIORRHAPHY VENTRAL;  Surgeon: Nicola Rivera MD;  Location: [facility]     PHACOEMULSIFICATION CLEAR CORNEA WITH STANDARD INTRAOCULAR LENS IMPLANT Left 6/28/2017    Procedure: PHACOEMULSIFICATION CLEAR CORNEA WITH STANDARD INTRAOCULAR LENS IMPLANT;  LEFT EYE PHACOEMULSIFICATION CLEAR CORNEA WITH STANDARD INTRAOCULAR LENS IMPLANT ;  Surgeon: Yifan Arroyo MD;  Location: [facility]       No current facility-administered medications on file prior to encounter.   Current Outpatient Prescriptions on File Prior to Encounter:  Ibuprofen (ADVIL PO) Take 200 mg by mouth every 6 hours as needed for moderate pain   atorvastatin (LIPITOR) 40 MG tablet Take 1 tablet (40 mg) by mouth daily   aspirin 81 MG EC tablet Take 1 tablet (81 mg) by mouth daily     /85  Pulse 54  Temp 36.3  C (97.4  F) (Temporal)  Resp 16  Ht 1.772 m (5' 9.76\")  Wt 88.7 kg (195 lb 8.8 oz)  SpO2 98%  BMI 28.25 kg/m2    Lab Results   Component Value Date    WBC 7.5 11/18/2016     Lab Results   Component Value Date    RBC 5.02 11/18/2016     Lab Results   Component Value Date    HGB 15.9 11/18/2016     Lab Results   Component Value Date    HCT 45.7 11/18/2016     Lab Results "   Component Value Date    MCV 91 11/18/2016     Lab Results   Component Value Date    MCH 31.7 11/18/2016     Lab Results   Component Value Date    MCHC 34.8 11/18/2016     Lab Results   Component Value Date    RDW 12.8 11/18/2016     Lab Results   Component Value Date     11/18/2016     Lab Results   Component Value Date    INR 0.95 11/18/2016       Lab Results   Component Value Date    CR 1.03 11/18/2016     EKG - 7/10/2015- SR, nl axis  Anesthesia Evaluation     .             ROS/MED HX    ENT/Pulmonary:     (+)sleep apnea, tobacco use, Past use doesn't use CPAP , . .    Neurologic:  - neg neurologic ROS     Cardiovascular: Comment: S/p angioplasty lower extremity 1/2017 for claudication    (+) Dyslipidemia, -Peripheral Vascular Disease-- Other and Symptomatic, --. : . . . :. .       METS/Exercise Tolerance:     Hematologic:  - neg hematologic  ROS       Musculoskeletal:         GI/Hepatic:     (+) liver disease, Other GI/Hepatic Gilbert's dz     (-) GERD   Renal/Genitourinary:  - ROS Renal section negative       Endo:  - neg endo ROS    (-) Type II DM and thyroid disease   Psychiatric:         Infectious Disease:         Malignancy:         Other:                     Physical Exam  Normal systems: cardiovascular, pulmonary and dental    Airway   Mallampati: II  TM distance: >3 FB  Neck ROM: full    Dental     Cardiovascular   Rhythm and rate: regular and normal      Pulmonary    breath sounds clear to auscultation                    Anesthesia Plan      History & Physical Review  History and physical reviewed and following examination; no interval change.    ASA Status:  2 .    NPO Status:  > 8 hours    Plan for MAC Reason for MAC:  Procedure to face, neck, head or breast    S/p cataract surgery 6/28/2017    Little bit anxious, wishes not to be aware      Postoperative Care  Postoperative pain management:  IV analgesics.      Consents  Anesthetic plan, risks, benefits and alternatives discussed with:   Patient..                          .

## 2017-07-26 NOTE — IP AVS SNAPSHOT
MRN:[id number]                      After Visit Summary   7/26/2017    Gilbert Ferrer    MRN: [id number]           Thank you!     Thank you for choosing [facility] for your care. Our goal is always to provide you with excellent care. Hearing back from our patients is one way we can continue to improve our services. Please take a few minutes to complete the written survey that you may receive in the mail after you visit with us. Thank you!        Patient Information     Date Of Birth          1945        About your hospital stay     You were admitted on:  July 26, 2017 You last received care in the:  [facility] [place]    You were discharged on:  July 26, 2017       Who to Call     For medical emergencies, please call 911.  For non-urgent questions about your medical care, please call your primary care provider or clinic, [phone]  For questions related to your surgery, please call your surgery clinic        Attending Provider     Provider Specialty    Yifan Arroyo MD Ophthalmology       Primary Care Provider Office Phone # Fax #    Erica Calloway [phone]      Further instructions from your care team       [facility] Discharge  Instructions  Anesthesia ([facility] [place])   Adult Discharge Instructions (General)    For 24 hours after surgery    1. Get plenty of rest.  Make arrangements to have a responsible adult stay with you for at least 24 hours.  2. Do not drive or use heavy equipment for 24 hours.    3. Do not drink alcohol for 24 hours.  4. Do not sign legal documents or make important decisions for 24 hours.  5. Avoid strenuous or risky activities. You may feel lightheaded.  If so, sit for a few minutes before standing.  Have someone help you get up.   6. General anesthesia patients should drink only clear liquids (apple juice, ginger ale, broth or 7-Up). Be sure to drink enough fluids.  Move to a regular  "diet as you feel able.  7. Any questions of medical nature, call your physician.      [facility]  Cataract Surgery Discharge Instructions  [facility] MD GALE Castellanos MD J. Hasan, MD C. Nichols, MD J. O'Neill, MD S. Schaefer, MD J. Stephens, MD        Start using drops at noon today after you remove the patch.    Ofloxacin (Tan top) - one drop in surgical eye 3 times  per day until gone.    Prednisolone acetate 1.0% (pink or white top) - one drop in surgical eye 3 times per day until gone.    Ketorolac (Grey top) - one drop in surgical eye 3 times per day until gone.        Place shield over surgical eye at bedtime for 3 nights.      The eye will feel itchy, scratchy, and vision will be blurred, you may take Tylenol for the scratchy feeling if this is bothersome.      No eye rubbing or swimming for I week.      You may resume all prescription medications as directed by your primary doctor.      Call if increasing pain, progressively worsening vision or worsening redness of surgical eye.      On-call doctor can be reached at [phone].    Pending Results     No orders found from 7/24/2017 to 7/27/2017.            Admission Information     Date & Time Provider Department Dept. Phone    7/26/2017 Yifan Arroyo MD [facility] [phone]      Your Vitals Were     Blood Pressure Pulse Temperature Respirations Height Weight    118/75 54 97.4  F (36.3  C) (Temporal) 16 1.772 m (5' 9.76\") 88.7 kg (195 lb 8.8 oz)    Pulse Oximetry BMI (Body Mass Index)                94% 28.25 kg/m2          MyChart Information     [employer] lets you send messages to your doctor, view your test results, renew your prescriptions, schedule appointments and more. To sign up, go to [email or web address] . Click on \"Log in\" on the left side of the screen, which will take you to the Welcome page. Then click on \"Sign up " "Now\" on the right side of the page.     You will be asked to enter the access code listed below, as well as some personal information. Please follow the directions to create your username and password.     Your access code is: [email or web address]  Expires: 2017  1:28 PM     Your access code will  in 90 days. If you need help or a new code, please call your [place] clinic or [phone].        Care EveryWhere ID     This is your Care EveryWhere ID. This could be used by other organizations to access your [place] medical records  [id number]        Equal Access to Services     : Hadbhavin Adams, nathan ugarte, sin harvey, jeff christopher . So [facility] [place] [facility] [phone].    ATENCIÓN: Si habla español, tiene a ball disposición servicios gratuitos de asistencia lingüística. Llame al [phone].    We comply with applicable federal civil rights laws and [place] laws. We do not discriminate on the basis of race, color, national origin, age, disability sex, sexual orientation or gender identity.               Review of your medicines      CONTINUE these medicines which have NOT CHANGED        Dose / Directions    ADVIL PO        Dose:  200 mg   Take 200 mg by mouth every 6 hours as needed for moderate pain   Refills:  0       aspirin 81 MG EC tablet   Used for:  Atherosclerosis of native artery of left lower extremity with intermittent claudication (H)        Dose:  81 mg   Take 1 tablet (81 mg) by mouth daily   Quantity:  90 tablet   Refills:  3       atorvastatin 40 MG tablet   Commonly known as:  LIPITOR   Used for:  Hyperlipidemia LDL goal <70        Dose:  40 mg   Take 1 tablet (40 mg) by mouth daily   Quantity:  90 tablet   Refills:  3       VITAMIN D-3 PO        Dose:  2000 Units   Take 2,000 Units by mouth daily   Refills:  0                Protect others around you: Learn how to safely use, store and throw away your medicines at " www.disposemymeds.org.             Medication List: This is a list of all your medications and when to take them. Check marks below indicate your daily home schedule. Keep this list as a reference.      Medications           Morning Afternoon Evening Bedtime As Needed    ADVIL PO   Take 200 mg by mouth every 6 hours as needed for moderate pain                                aspirin 81 MG EC tablet   Take 1 tablet (81 mg) by mouth daily                                atorvastatin 40 MG tablet   Commonly known as:  LIPITOR   Take 1 tablet (40 mg) by mouth daily                                VITAMIN D-3 PO   Take 2,000 Units by mouth daily

## 2018-01-08 ENCOUNTER — HOSPITAL ENCOUNTER (OUTPATIENT)
Dept: ULTRASOUND IMAGING | Facility: CLINIC | Age: 73
End: 2018-01-08
Attending: RADIOLOGY
Payer: MEDICARE

## 2018-01-08 ENCOUNTER — OFFICE VISIT (OUTPATIENT)
Dept: OTHER | Facility: CLINIC | Age: 73
End: 2018-01-08
Attending: RADIOLOGY
Payer: MEDICARE

## 2018-01-08 ENCOUNTER — HOSPITAL ENCOUNTER (OUTPATIENT)
Dept: ULTRASOUND IMAGING | Facility: CLINIC | Age: 73
Discharge: HOME | End: 2018-01-08
Attending: RADIOLOGY | Admitting: RADIOLOGY
Payer: MEDICARE

## 2018-01-08 VITALS — HEART RATE: 75 BPM | SYSTOLIC BLOOD PRESSURE: 126 MMHG | DIASTOLIC BLOOD PRESSURE: 75 MMHG

## 2018-01-08 DIAGNOSIS — I73.9 PAD (PERIPHERAL ARTERY DISEASE) (H): ICD-10-CM

## 2018-01-08 DIAGNOSIS — I73.9 PAD (PERIPHERAL ARTERY DISEASE) (H): Primary | ICD-10-CM

## 2018-01-08 PROCEDURE — 93926 LOWER EXTREMITY STUDY: CPT | Mod: LT

## 2018-01-08 PROCEDURE — 93924 LWR XTR VASC STDY BILAT: CPT

## 2018-01-08 NOTE — PROGRESS NOTES
Dear Dr. Erica Calloway    I saw Gilbert Durbin [facility] today. He is a 71-year-old man who underwent angioplasty of the left tibial peroneal trunk for treatment of life limiting left lower extremity claudication. Since then, the patient has been doing well. He denies having any residual claudication symptoms. He is able to perform his activities of daily living without problems. He does state that he has not pushed himself and is not involved in a vigorous exercise regimen at this time.    Repeat ABIs and an ultrasound of the left tibial peroneal trunk was performed today. This shows the left tibial peroneal trunk to be patent without significant stenosis. The resting and exercise ABIs on the right are 1.04 and 0.97 respectively and 1.18 and 1.25 respectively. Waveforms in the distal tibial arteries are biphasic on the right and triphasic on the left.    IMPRESSION AND PLAN: Doing well status post angioplasty of the left tibial peroneal trunk on 11/18/2016. No further follow-up will be initiated at this time. If the patient were to develop recurrent claudication symptoms, repeat noninvasive imaging studies could be performed.    Total time spent discussing cares 5 minutes.

## 2018-01-08 NOTE — NURSING NOTE
"Chief Complaint   Patient presents with     RECHECK     LILLI w/exercise and Left leg arterial imaging ([facility] 1:15; FAS 2:30) *h/o left tibial peroneal trunk angioplasty done on /11/18/2016. Comparison study 5/15/2017 6 mo f/u       Initial /75 (BP Location: Left arm, Patient Position: Chair, Cuff Size: Adult Large)  Pulse 75 Estimated body mass index is 28.25 kg/(m^2) as calculated from the following:    Height as of 7/26/17: 5' 9.76\" (1.772 m).    Weight as of 7/26/17: 195 lb 8.8 oz (88.7 kg).  Medication Reconciliation: amalia Peter CMA    "

## 2018-11-19 DIAGNOSIS — I73.9 PAD (PERIPHERAL ARTERY DISEASE) (H): Primary | ICD-10-CM

## 2018-11-27 ENCOUNTER — TELEPHONE (OUTPATIENT)
Dept: OTHER | Facility: CLINIC | Age: 73
End: 2018-11-27

## 2018-11-27 ENCOUNTER — HOSPITAL ENCOUNTER (OUTPATIENT)
Dept: ULTRASOUND IMAGING | Facility: CLINIC | Age: 73
End: 2018-11-27
Attending: RADIOLOGY
Payer: MEDICARE

## 2018-11-27 ENCOUNTER — HOSPITAL ENCOUNTER (OUTPATIENT)
Dept: ULTRASOUND IMAGING | Facility: CLINIC | Age: 73
Discharge: HOME | End: 2018-11-27
Attending: RADIOLOGY | Admitting: RADIOLOGY
Payer: MEDICARE

## 2018-11-27 DIAGNOSIS — I73.9 PAD (PERIPHERAL ARTERY DISEASE) (H): ICD-10-CM

## 2018-11-27 PROCEDURE — 93924 LWR XTR VASC STDY BILAT: CPT

## 2018-11-27 PROCEDURE — 93926 LOWER EXTREMITY STUDY: CPT | Mod: LT

## 2018-11-27 NOTE — TELEPHONE ENCOUNTER
Called patient with results.  Normal study.  Instructed to contact us if questions or concerns.  Judy Sanz RN  IR nurse clinician  [phone]  ULTRASOUND LEFT LOWER EXTREMITY ARTERIAL DUPLEX  11/27/2018 3:20 PM      HISTORY:  History of left tibioperoneal trunk angioplasty done on  11/18/2016. Patient now claudicating. PAD (peripheral artery disease)  (H).     COMPARISON: 1/18/2018.     FINDINGS: Color Doppler and spectral waveform analysis was performed  throughout the popliteal, tibioperoneal trunk and tibial vessels. The  popliteal artery, tibioperoneal trunk, anterior tibial, posterior  tibial and peroneal arteries are all patent without evidence of  stenosis. All waveforms are triphasic.         IMPRESSION: Patent popliteal artery and tibial vessels. No evidence of  stenosis or occlusion.   ULTRASOUND ANKLE-BRACHIAL INDEX DOPPLER WITH EXERCISE BILATERAL    11/27/2018 3:20 PM      HISTORY:  History of left tibioperoneal trunk angioplasty done on  11/18/2016. Patient now claudicating. PAD (peripheral artery disease)  (H).     COMPARISON: 1/8/2018     FINDINGS:  Right LILLI: 1.10, previously 1.04.  Left LILLI: 1.20, previously 1.18.     Waveforms: Triphasic bilaterally.     Exercise: Patient exercised on a treadmill for 5 minutes at 2.0 miles  per hour at a 10% incline with no symptoms.     Right exercise LILLI: 1.08, previously 0.97.  Left exercise LILLI: 1.20, previously 1.25.         IMPRESSION: Normal resting and exercise ABIs bilaterally without  evidence of arterial insufficiency.     LILLI CRITERIA:  >0.95 Normal  0.90 - 0.94 Mild  0.5 - 0.89 Moderate  0.2 - 0.49 Severe  <0.2 Critical     GEORGI PEIRSON DO

## 2019-07-30 DIAGNOSIS — I73.9 PAD (PERIPHERAL ARTERY DISEASE) (H): Primary | ICD-10-CM

## 2019-08-05 ENCOUNTER — HOSPITAL ENCOUNTER (OUTPATIENT)
Dept: ULTRASOUND IMAGING | Facility: CLINIC | Age: 74
End: 2019-08-05
Attending: RADIOLOGY
Payer: MEDICARE

## 2019-08-05 ENCOUNTER — HOSPITAL ENCOUNTER (OUTPATIENT)
Dept: ULTRASOUND IMAGING | Facility: CLINIC | Age: 74
Discharge: HOME | End: 2019-08-05
Attending: RADIOLOGY | Admitting: RADIOLOGY
Payer: MEDICARE

## 2019-08-05 DIAGNOSIS — I73.9 PAD (PERIPHERAL ARTERY DISEASE) (H): ICD-10-CM

## 2019-08-05 PROCEDURE — 93924 LWR XTR VASC STDY BILAT: CPT

## 2019-08-05 PROCEDURE — 93926 LOWER EXTREMITY STUDY: CPT | Mod: LT

## 2020-11-12 ENCOUNTER — TELEPHONE (OUTPATIENT)
Dept: OTHER | Facility: CLINIC | Age: 75
End: 2020-11-12

## 2020-11-12 DIAGNOSIS — I73.9 PAD (PERIPHERAL ARTERY DISEASE) (H): Primary | ICD-10-CM

## 2020-11-12 NOTE — TELEPHONE ENCOUNTER
Gilbert called stating having pain in lower left leg. States no discoloration, open wounds.    Has had stent put in this leg prior 5/2017.    I told Gilbert either Judy Vides nurse or myself will reach out to schedule once direction is given.      Rosa SCHMIDT

## 2020-11-12 NOTE — TELEPHONE ENCOUNTER
Left message on VM.  Patient will need to have ultrasound of left leg for pain.  Patient was due for imaging in August 2020.  Will review with Dr. Lundy and call patient with results.   Patient can call central scheduling to schedule.  Judy Sanz RN  IR nurse clinician  [phone]

## 2020-11-24 ENCOUNTER — HOSPITAL ENCOUNTER (OUTPATIENT)
Dept: ULTRASOUND IMAGING | Facility: CLINIC | Age: 75
End: 2020-11-24
Attending: RADIOLOGY
Payer: MEDICARE

## 2020-11-24 ENCOUNTER — TELEPHONE (OUTPATIENT)
Dept: OTHER | Facility: CLINIC | Age: 75
End: 2020-11-24

## 2020-11-24 DIAGNOSIS — I73.9 PAD (PERIPHERAL ARTERY DISEASE) (H): ICD-10-CM

## 2020-11-24 PROCEDURE — 93924 LWR XTR VASC STDY BILAT: CPT

## 2020-11-24 PROCEDURE — 93926 LOWER EXTREMITY STUDY: CPT | Mod: LT

## 2020-11-24 NOTE — TELEPHONE ENCOUNTER
Called patient with results.  Follow up with primary physician for leg pain.   Annual f/u, unless concerns.  Judy Sanz RN  IR nurse clinician  [phone]    IR LILLI US LILLI DOPPLER WITH EXERCISE BILATERAL   11/24/2020 3:38 PM      HISTORY: s/p left tibioperoneal trunk angioplasty 11/2016.  Comparison  study done 8/2019.  Patient having left leg pain.     COMPARISON: None.     FINDINGS:  Right LILLI:   DP: 1.10  PT: 1.12.     Left LILLI:   DP: 1.09   PT: 1.11.     Waveforms: Triphasic in the distal tibial arteries     Exercise: Patient walked on a treadmill for 5 minutes at a 10% incline  and at 1.5 miles per hour. Patient had slight left calf tightness  while walking..     Right exercise LILLI: 1.20.  Left exercise LILLI: 1.19                                                                      IMPRESSION: Findings do not indicate significant lower extremity  arterial insufficiency.     LILLI CRITERIA:  >0.95 Normal  0.90 - 0.94 Mild  0.5 - 0.89 Moderate  0.2 - 0.49 Severe  <0.2 Critical     EUGENE BELL MD

## 2021-07-25 NOTE — MR AVS SNAPSHOT
"              After Visit Summary   1/8/2018    Gilbert Ferrer    MRN: [id number]           Patient Information     Date Of Birth          1945        Visit Information        Provider Department      1/8/2018 2:30 PM James Lundy MD [facility]        Today's Diagnoses     PAD (peripheral artery disease) (H)    -  1      Care Instructions    Contact us sooner if symptoms progress or worsens.          Follow-ups after your visit        Follow-up notes from your care team     Return if symptoms worsen or fail to improve.      Who to contact     If you have questions or need follow up information about today's clinic visit or your schedule please contact [facility] directly at [phone].  Normal or non-critical lab and imaging results will be communicated to you by MyChart, letter or phone within 4 business days after the clinic has received the results. If you do not hear from us within 7 days, please contact the clinic through MyChart or phone. If you have a critical or abnormal lab result, we will notify you by phone as soon as possible.  Submit refill requests through [employer] or call your pharmacy and they will forward the refill request to us. Please allow 3 business days for your refill to be completed.          Additional Information About Your Visit        MyChart Information     [employer] lets you send messages to your doctor, view your test results, renew your prescriptions, schedule appointments and more. To sign up, go to [email or web address] . Click on \"Log in\" on the left side of the screen, which will take you to the Welcome page. Then click on \"Sign up Now\" on the right side of the page.     You will be asked to enter the access code listed below, as well as some personal information. Please follow the directions to create your username and password.     Your access code is: [email or web address]  Expires: 4/9/2018  9:58 AM     Your access code " will  in 90 days. If you need help or a new code, please call your [place] clinic or [phone].        Care EveryWhere ID     This is your Care EveryWhere ID. This could be used by other organizations to access your [place] medical records  [id number]        Your Vitals Were     Pulse                   75            Blood Pressure from Last 3 Encounters:   18 126/75   17 124/83   17 111/75    Weight from Last 3 Encounters:   17 195 lb 8.8 oz (88.7 kg)   17 195 lb 8.8 oz (88.7 kg)   16 196 lb 4.8 oz (89 kg)              Today, you had the following     No orders found for display       Primary Care Provider Office Phone # Fax #    Erica Calloway [phone]       GIANNA DONNELLY [address]  [place] [address]        Equal Access to Services     [facility]: Hadii aad ku hadasho Soomaali, waaxda luqadaha, qaybta kaalmada adeegyada, waxay idiin hayaan juan carlos christopher . So [facility] [phone].    ATENCIÓN: Si suzyla español, tiene a ball disposición servicios gratuitos de asistencia lingüística. Llame al [phone].    We comply with applicable federal civil rights laws and [place] laws. We do not discriminate on the basis of race, color, national origin, age, disability, sex, sexual orientation, or gender identity.            Thank you!     Thank you for choosing [facility] [place]  for your care. Our goal is always to provide you with excellent care. Hearing back from our patients is one way we can continue to improve our services. Please take a few minutes to complete the written survey that you may receive in the mail after your visit with us. Thank you!             Your Updated Medication List - Protect others around you: Learn how to safely use, store and throw away your medicines at www.disposemymeds.org.          This list is accurate as of: 18 11:59 PM.  Always use your most recent med list.                    Brand Name Dispense Instructions for use Diagnosis    ADVIL PO      Take 200 mg by mouth every 6 hours as needed for moderate pain        aspirin 81 MG EC tablet     90 tablet    Take 1 tablet (81 mg) by mouth daily    Atherosclerosis of native artery of left lower extremity with intermittent claudication (H)       atorvastatin 40 MG tablet    LIPITOR    90 tablet    Take 1 tablet (40 mg) by mouth daily    Hyperlipidemia LDL goal <70       VITAMIN D-3 PO      Take 2,000 Units by mouth daily           sudden onset

## 2023-02-05 ENCOUNTER — HOSPITAL ENCOUNTER (OUTPATIENT)
Facility: CLINIC | Age: 78
Setting detail: OBSERVATION
Discharge: HOME | End: 2023-02-06
Attending: EMERGENCY MEDICINE | Admitting: INTERNAL MEDICINE
Payer: COMMERCIAL

## 2023-02-05 DIAGNOSIS — R55 SYNCOPE, UNSPECIFIED SYNCOPE TYPE: ICD-10-CM

## 2023-02-05 DIAGNOSIS — G47.33 OSA (OBSTRUCTIVE SLEEP APNEA): Primary | ICD-10-CM

## 2023-02-05 DIAGNOSIS — I48.91 ATRIAL FIBRILLATION WITH RVR (H): ICD-10-CM

## 2023-02-05 LAB
ANION GAP SERPL CALCULATED.3IONS-SCNC: 11 MMOL/L (ref 7–15)
ATRIAL RATE - MUSE: NORMAL BPM
BASOPHILS # BLD AUTO: 0.1 10E3/UL (ref 0–0.2)
BASOPHILS NFR BLD AUTO: 1 %
BUN SERPL-MCNC: 15.6 MG/DL (ref 8–23)
CALCIUM SERPL-MCNC: 9.6 MG/DL (ref 8.8–10.2)
CHLORIDE SERPL-SCNC: 102 MMOL/L (ref 98–107)
CREAT SERPL-MCNC: 1.15 MG/DL (ref 0.67–1.17)
DEPRECATED HCO3 PLAS-SCNC: 27 MMOL/L (ref 22–29)
DIASTOLIC BLOOD PRESSURE - MUSE: NORMAL MMHG
EOSINOPHIL # BLD AUTO: 0.1 10E3/UL (ref 0–0.7)
EOSINOPHIL NFR BLD AUTO: 1 %
ERYTHROCYTE [DISTWIDTH] IN BLOOD BY AUTOMATED COUNT: 12.7 % (ref 10–15)
GFR SERPL CREATININE-BSD FRML MDRD: 66 ML/MIN/1.73M2
GLUCOSE SERPL-MCNC: 134 MG/DL (ref 70–99)
HCT VFR BLD AUTO: 50.8 % (ref 40–53)
HGB BLD-MCNC: 17.3 G/DL (ref 13.3–17.7)
IMM GRANULOCYTES # BLD: 0 10E3/UL
IMM GRANULOCYTES NFR BLD: 0 %
INTERPRETATION ECG - MUSE: NORMAL
LYMPHOCYTES # BLD AUTO: 1.3 10E3/UL (ref 0.8–5.3)
LYMPHOCYTES NFR BLD AUTO: 14 %
MAGNESIUM SERPL-MCNC: 2.2 MG/DL (ref 1.7–2.3)
MCH RBC QN AUTO: 31.3 PG (ref 26.5–33)
MCHC RBC AUTO-ENTMCNC: 34.1 G/DL (ref 31.5–36.5)
MCV RBC AUTO: 92 FL (ref 78–100)
MONOCYTES # BLD AUTO: 0.7 10E3/UL (ref 0–1.3)
MONOCYTES NFR BLD AUTO: 8 %
NEUTROPHILS # BLD AUTO: 7.2 10E3/UL (ref 1.6–8.3)
NEUTROPHILS NFR BLD AUTO: 76 %
NRBC # BLD AUTO: 0 10E3/UL
NRBC BLD AUTO-RTO: 0 /100
P AXIS - MUSE: NORMAL DEGREES
PLATELET # BLD AUTO: 232 10E3/UL (ref 150–450)
POTASSIUM SERPL-SCNC: 4.2 MMOL/L (ref 3.4–5.3)
PR INTERVAL - MUSE: NORMAL MS
QRS DURATION - MUSE: 88 MS
QT - MUSE: 302 MS
QTC - MUSE: 457 MS
R AXIS - MUSE: 7 DEGREES
RBC # BLD AUTO: 5.52 10E6/UL (ref 4.4–5.9)
SODIUM SERPL-SCNC: 140 MMOL/L (ref 136–145)
SYSTOLIC BLOOD PRESSURE - MUSE: NORMAL MMHG
T AXIS - MUSE: 226 DEGREES
TROPONIN T SERPL HS-MCNC: 26 NG/L
TROPONIN T SERPL HS-MCNC: 30 NG/L
TSH SERPL DL<=0.005 MIU/L-ACNC: 2.37 UIU/ML (ref 0.3–4.2)
VENTRICULAR RATE- MUSE: 138 BPM
WBC # BLD AUTO: 9.4 10E3/UL (ref 4–11)

## 2023-02-05 PROCEDURE — 96361 HYDRATE IV INFUSION ADD-ON: CPT

## 2023-02-05 PROCEDURE — 84484 ASSAY OF TROPONIN QUANT: CPT | Performed by: INTERNAL MEDICINE

## 2023-02-05 PROCEDURE — 258N000003 HC RX IP 258 OP 636: Performed by: EMERGENCY MEDICINE

## 2023-02-05 PROCEDURE — 99222 1ST HOSP IP/OBS MODERATE 55: CPT | Mod: AI | Performed by: INTERNAL MEDICINE

## 2023-02-05 PROCEDURE — 250N000011 HC RX IP 250 OP 636: Performed by: EMERGENCY MEDICINE

## 2023-02-05 PROCEDURE — 93005 ELECTROCARDIOGRAM TRACING: CPT

## 2023-02-05 PROCEDURE — 84443 ASSAY THYROID STIM HORMONE: CPT | Performed by: EMERGENCY MEDICINE

## 2023-02-05 PROCEDURE — G0378 HOSPITAL OBSERVATION PER HR: HCPCS

## 2023-02-05 PROCEDURE — 96374 THER/PROPH/DIAG INJ IV PUSH: CPT

## 2023-02-05 PROCEDURE — 36415 COLL VENOUS BLD VENIPUNCTURE: CPT | Performed by: EMERGENCY MEDICINE

## 2023-02-05 PROCEDURE — 250N000013 HC RX MED GY IP 250 OP 250 PS 637: Performed by: INTERNAL MEDICINE

## 2023-02-05 PROCEDURE — 82310 ASSAY OF CALCIUM: CPT | Performed by: EMERGENCY MEDICINE

## 2023-02-05 PROCEDURE — 85025 COMPLETE CBC W/AUTO DIFF WBC: CPT | Performed by: EMERGENCY MEDICINE

## 2023-02-05 PROCEDURE — 99285 EMERGENCY DEPT VISIT HI MDM: CPT | Mod: 25

## 2023-02-05 PROCEDURE — 84484 ASSAY OF TROPONIN QUANT: CPT | Performed by: EMERGENCY MEDICINE

## 2023-02-05 PROCEDURE — 36415 COLL VENOUS BLD VENIPUNCTURE: CPT | Performed by: INTERNAL MEDICINE

## 2023-02-05 PROCEDURE — 83735 ASSAY OF MAGNESIUM: CPT | Performed by: EMERGENCY MEDICINE

## 2023-02-05 PROCEDURE — 93010 ELECTROCARDIOGRAM REPORT: CPT | Performed by: INTERNAL MEDICINE

## 2023-02-05 RX ORDER — DILTIAZEM HCL 60 MG
60 TABLET ORAL EVERY 6 HOURS SCHEDULED
Status: DISCONTINUED | OUTPATIENT
Start: 2023-02-05 | End: 2023-02-05

## 2023-02-05 RX ORDER — ACETAMINOPHEN 325 MG/1
650 TABLET ORAL EVERY 6 HOURS PRN
Status: DISCONTINUED | OUTPATIENT
Start: 2023-02-05 | End: 2023-02-06 | Stop reason: HOSPADM

## 2023-02-05 RX ORDER — ONDANSETRON 2 MG/ML
4 INJECTION INTRAMUSCULAR; INTRAVENOUS EVERY 6 HOURS PRN
Status: DISCONTINUED | OUTPATIENT
Start: 2023-02-05 | End: 2023-02-06 | Stop reason: HOSPADM

## 2023-02-05 RX ORDER — DILTIAZEM HYDROCHLORIDE 180 MG/1
180 CAPSULE, COATED, EXTENDED RELEASE ORAL DAILY
Status: DISCONTINUED | OUTPATIENT
Start: 2023-02-05 | End: 2023-02-06 | Stop reason: HOSPADM

## 2023-02-05 RX ORDER — ACETAMINOPHEN 650 MG/1
650 SUPPOSITORY RECTAL EVERY 6 HOURS PRN
Status: DISCONTINUED | OUTPATIENT
Start: 2023-02-05 | End: 2023-02-06 | Stop reason: HOSPADM

## 2023-02-05 RX ORDER — DILTIAZEM HYDROCHLORIDE 5 MG/ML
10 INJECTION INTRAVENOUS ONCE
Status: COMPLETED | OUTPATIENT
Start: 2023-02-05 | End: 2023-02-05

## 2023-02-05 RX ORDER — ONDANSETRON 4 MG/1
4 TABLET, ORALLY DISINTEGRATING ORAL EVERY 6 HOURS PRN
Status: DISCONTINUED | OUTPATIENT
Start: 2023-02-05 | End: 2023-02-06 | Stop reason: HOSPADM

## 2023-02-05 RX ORDER — ATORVASTATIN CALCIUM 40 MG/1
40 TABLET, FILM COATED ORAL EVERY EVENING
Status: DISCONTINUED | OUTPATIENT
Start: 2023-02-05 | End: 2023-02-06 | Stop reason: HOSPADM

## 2023-02-05 RX ADMIN — SODIUM CHLORIDE 1000 ML: 9 INJECTION, SOLUTION INTRAVENOUS at 11:48

## 2023-02-05 RX ADMIN — ATORVASTATIN CALCIUM 40 MG: 40 TABLET, FILM COATED ORAL at 20:09

## 2023-02-05 RX ADMIN — DILTIAZEM HYDROCHLORIDE 10 MG: 5 INJECTION INTRAVENOUS at 11:50

## 2023-02-05 RX ADMIN — APIXABAN 5 MG: 5 TABLET, FILM COATED ORAL at 15:34

## 2023-02-05 RX ADMIN — DILTIAZEM HYDROCHLORIDE 180 MG: 180 CAPSULE, COATED, EXTENDED RELEASE ORAL at 15:34

## 2023-02-05 ASSESSMENT — ENCOUNTER SYMPTOMS
DIZZINESS: 1
PALPITATIONS: 1
SHORTNESS OF BREATH: 0

## 2023-02-05 ASSESSMENT — ACTIVITIES OF DAILY LIVING (ADL)
ADLS_ACUITY_SCORE: 33
ADLS_ACUITY_SCORE: 33
ADLS_ACUITY_SCORE: 35
ADLS_ACUITY_SCORE: 33

## 2023-02-05 NOTE — H&P
[facility]    History and Physical - Hospitalist Service       Date of Admission:  2/5/2023    Assessment & Plan      Gilbert Ferrer is a 77 year old male admitted on 2/5/2023. He presented to the ER after feeling unwell at [facility] and was found to be in afib with RVR.    New onset Afib with RVR  Syncopal episode due to afib with RVR  Elevated troponin, suspect demand leak:  -  Initially given diltiazem 10 mg IV x 1.  HR improved, now starting to creep up.  Will start oral diltiazem short acting initially and titrate.  Can transition to long acting once rate control dose clear.  EKG reviewed and consistent   -  Discussed anticoag, given age scoring over 2 CHADSVasc.  After discussing options was agreeable to Eliquis which I will start.  -  Echocardiogram, doubt valve disease given history/exam  -  Cardiac monitoring  -  No injuries with syncope, sounds like fell against left shoulder then lowered to ground.  He never had full LOC per his wife present but he cannot remember a few moments around that time.  -  Elevated trop likely demand leak.  Echo planned.  Hold on further ASA as adding eliquis.  -  Cardiology consult    ADDENDUM:  After arriving to the floor he appeared to convert to SR.  Will still plan a longer acting diltiazem as he is at high risk of going back into an afib with RVR.  Continue cardiac monitoring, echo plan, anticoag.     Hyperlipidemia:  - Continue statin    DAVID:  - CPAP         Medical Decision Making       70 MINUTES SPENT BY ME on the date of service doing chart review, history, exam, documentation & further activities per the note.          Diet:   Regular  DVT Prophylaxis: DOAC  Tejeda Catheter: Not present  Lines: None     Cardiac Monitoring: ACTIVE order. Indication: Tachyarrhythmias, acute (48 hours)  Code Status: Full Code      Clinically Significant Risk Factors Present on Admission                       # Overweight: Estimated body mass index is 27.98 kg/m  as  "calculated from the following:    Height as of this encounter: 1.778 m (5' 10\").    Weight as of this encounter: 88.5 kg (195 lb).           Disposition Plan      Expected Discharge Date: 02/06/2023                  Neil Krishnan DO  Hospitalist Service  [facility]  Securely message with [employer] (more info)  Text page via [employer] Paging/Directory   ____________________________________________________________________    Chief Complaint   Dizzy    History is obtained from the patient and his wife who is an RN    History of Present Illness   Gilbert Ferrer is a 77 year old male presented via EMS from [facility] this AM.  He felt unwell since getting up this AM though it got worse at [facility].  He tried to leave and walk around but felt worse and then fell sideways banging his left shoulder and laid down onto ground as he felt quite dizzy.  His wife is an RN and reports he looked quite pale and his HR was \"very fast.\"  He denied chest pain.  He reportedly never had any ongoing LOC but he couldn't remember some of the time when he was laying down.  He denies any arm pain where he hit his shoulder or any other pain.  He felt well yesterday.  No prior history of afib.        Past Medical History    Past Medical History:   Diagnosis Date     Elevated PSA      Gilbert's disease      Hyperlipidemia      Left rotator cuff tear      DAVID (obstructive sleep apnea)      PAD (peripheral artery disease) (H)      Sleep apnea     DOES NOT USE CPAP     Spermatocele     Bilateral     Stented coronary artery      Umbilical hernia      Vitamin D deficiency      Vitamin D deficiency        Past Surgical History   Past Surgical History:   Procedure Laterality Date     CARDIAC SURGERY      STENT     COLONOSCOPY       HERNIORRHAPHY VENTRAL N/A 3/18/2016    Procedure: HERNIORRHAPHY VENTRAL;  Surgeon: Nicola Rivera MD;  Location:  [facility]     PHACOEMULSIFICATION CLEAR CORNEA WITH STANDARD INTRAOCULAR LENS IMPLANT Left " 6/28/2017    Procedure: PHACOEMULSIFICATION CLEAR CORNEA WITH STANDARD INTRAOCULAR LENS IMPLANT;  LEFT EYE PHACOEMULSIFICATION CLEAR CORNEA WITH STANDARD INTRAOCULAR LENS IMPLANT ;  Surgeon: Yifan Arroyo MD;  Location: [facility]     PHACOEMULSIFICATION CLEAR CORNEA WITH STANDARD INTRAOCULAR LENS IMPLANT Right 7/26/2017    Procedure: PHACOEMULSIFICATION CLEAR CORNEA WITH STANDARD INTRAOCULAR LENS IMPLANT;  RIGHT EYE PHACOEMULSIFICATION CLEAR CORNEA WITH STANDARD INTRAOCULAR LENS IMPLANT ;  Surgeon: Yifan Arroyo MD;  Location: [facility]       Prior to Admission Medications   Prior to Admission Medications   Prescriptions Last Dose Informant Patient Reported? Taking?   Cholecalciferol (VITAMIN D-3 PO)   Yes No   Sig: Take 2,000 Units by mouth daily   Ibuprofen (ADVIL PO)   Yes No   Sig: Take 200 mg by mouth every 6 hours as needed for moderate pain   aspirin 81 MG EC tablet   No No   Sig: Take 1 tablet (81 mg) by mouth daily   atorvastatin (LIPITOR) 40 MG tablet   No No   Sig: Take 1 tablet (40 mg) by mouth daily      Facility-Administered Medications: None      Allergies   Allergies   Allergen Reactions     Mold             Physical Exam   Vital Signs: Temp: 97.5  F (36.4  C) Temp src: Temporal BP: (!) 114/106 Pulse: (!) 138   Resp: 28 SpO2: 96 % O2 Device: None (Room air)    Weight: 195 lbs 0 oz    GEN:  Alert, oriented x 3, appears comfortable, NAD.  HEENT:  Normocephalic/atraumatic, no scleral icterus, no nasal discharge, mouth moist.  CV:  Irreg Irreg with rate around 110.  LUNGS:  Clear to auscultation bilaterally without rales/rhonchi/wheezing/retractions.  Symmetric chest rise on inhalation noted.  ABD:  Active bowel sounds, soft, non-tender/non-distended.  No rebound/guarding/rigidity.  EXT:  No edema.  No cyanosis.  No joint synovitis noted.  Moving arms/legs well.  SKIN:  Dry to touch, no exanthems noted in the visualized areas.  NEURO:  Symmetric muscle strength, sensation to touch grossly  intact.   No new focal deficits appreciated.    Data     I have personally reviewed the following data over the past 24 hrs:    9.4  \   17.3   / 232     140 102 15.6 /  134 (H)   4.2 27 1.15 \       Trop: 26 (H) BNP: N/A       TSH: 2.37 T4: N/A A1C: N/A         No results found for this or any previous visit (from the past 24 hour(s)).

## 2023-02-05 NOTE — ED NOTES
"[facility]  ED Nurse Handoff Report    ED Chief complaint: Atrial Fib      ED Diagnosis:   Final diagnoses:   Atrial fibrillation with RVR (H)       Code Status: not addressed     Allergies:   Allergies   Allergen Reactions    Mold        Patient Story: Lives home with wife, went to [employer] , felt light headed, walked out and had near syncopal episode, lowered self to floor has left hand bruise from  That, was pale cold clammy, complaints of constipation, has 2 heart stents, takes asa no thinners, new a fib today, bg 120, no sob , vss            Focused Assessment: unknown when he went into a fib  Treatments and/or interventions provided: diltiazem , NS   Patient's response to treatments and/or interventions: slower rate after dilt     To be done/followed up on inpatient unit:  60 mg po diltiazem ordered by hospitalist not verified or sent by pharmacy prior to leaving ED    Does this patient have any cognitive concerns?:  none    Activity level - Baseline/Home:  Independent  Activity Level - Current:   Unknown    Patient's Preferred language: English   Needed?: No    Isolation: None  Infection: Not Applicable  Patient tested for COVID 19 prior to admission: NO  Bariatric?: No    Vital Signs:   Vitals:    02/05/23 1130   BP: (!) 116/95   BP Location: Right arm   Pulse: 96   Resp: 18   Temp: 97.5  F (36.4  C)   TempSrc: Temporal   SpO2: 98%   Weight: 88.5 kg (195 lb)   Height: 1.778 m (5' 10\")       Cardiac Rhythm:     Was the PSS-3 completed:   Yes  What interventions are required if any?               Family Comments:   OBS brochure/video discussed/provided to patient/family:               Name of person given brochure if not patient:               Relationship to patient:     For the majority of the shift this patient's behavior was .   Behavioral interventions performed were   .    ED NURSE PHONE NUMBER: [phone]       "

## 2023-02-05 NOTE — ED TRIAGE NOTES
Lives home with wife, went to [employer] , felt light headed, walked out and had near syncopal episode, lowered self to floor has left hand bruise from  That, was pale cold clammy, complaints of constipation, has 2 heart stents, takes asa no thinners, new a fib today, bg 120, no sob , vss

## 2023-02-05 NOTE — ED PROVIDER NOTES
History     Chief Complaint:  Atrial Fib       The history is provided by the patient and the EMS personnel.      Gilbert Ferrer is a 77 year old male with a history of hyperlipidemia and cardiac stent placement who presents after a syncopal episode. Patient states that he woke up this morning feeling normal this morning. Then he went to [employer] and he began to feel faint and sweaty. EMS then reported that the patients wife stated that he lowered himself to the ground and had a syncopal episode. Upon EMS arrival he was pale, cool, and clammy. Patient states that he does not have a history of irregular rhythm or family history.  No known history of A-fib.  Not on any anticoagulation.  He does not feel any palpitations now.  He has not felt palpitations in his past nor any today when the syncopal episode occurred.  Denies headache and denies any head injury.  His wife helped slowly lowered him to the ground.  He denies nausea, shortness of breath, chest pain or pressure, headache, and excess caffeine.     Independent Historian:   None - Patient Only and EMS - They report the history noted above that was then reassured by the patient.     Review of External Notes:   None     ROS:  Review of Systems   Respiratory: Negative for shortness of breath.    Cardiovascular: Positive for palpitations. Negative for chest pain.   Neurological: Positive for dizziness and syncope.     Allergies:  Mold     Medications:    Aspirin 81 mg  Lipitor  Cholecalciferol    Past Medical History:    Elevated PSA  Gilbert's disease  Hyperlipidemia  Left rotator cuff tear  DAVID  PAD  Sleep apnea  Spermatocele  Stented coronary artery  Umbilical hernia  Vitamin D deficiency    Past Surgical History:    Colonoscopy  Cardiac stent surgery  Hernia repair  Phacoemulsification clear cornea with standard intraocular lens implant- bilateral    Family History:    The patient denies any prior family history.    Social History:   reports that he has quit  "smoking. He has never used smokeless tobacco. He reports that he does not drink alcohol and does not use drugs.  PCP: Erica Calloway   Patient came from [facility].  Patient is unaccompanied in the ED.  Patient arrives to the ED via EMS.  Patient denies alcohol use.  Physical Exam     Patient Vitals for the past 24 hrs:   BP Temp Temp src Pulse Resp SpO2 Height Weight   02/05/23 1215 121/79 -- -- 82 -- -- -- --   02/05/23 1200 119/81 -- -- 90 -- -- -- --   02/05/23 1146 (!) 122/97 -- -- (!) 142 -- -- -- --   02/05/23 1138 -- -- -- (!) 149 -- 99 % -- --   02/05/23 1134 -- -- -- (!) 142 -- 98 % -- --   02/05/23 1133 -- -- -- (!) 128 -- 98 % -- --   02/05/23 1130 (!) 116/95 97.5  F (36.4  C) Temporal 96 18 98 % 1.778 m (5' 10\") 88.5 kg (195 lb)      Physical Exam  General:  Sitting on bed.  HENT:  No obvious trauma to head  Right Ear:  External ear normal.   Left Ear:  External ear normal.   Nose:  Nose normal.   Eyes:  Conjunctivae and EOM are normal. Pupils are equal, round, and reactive.   Neck: Normal range of motion. Neck supple. No tracheal deviation present.   CV:  Normal heart sounds. No murmur heard.  Tachycardic.  Irregularly irregular.  Pulm/Chest: Effort normal and breath sounds normal.   Abd: Soft. No distension. There is no tenderness. There is no rigidity, no rebound and no guarding.   M/S: Normal range of motion.   Neuro: Awake and alert.  Skin: Skin is warm and dry. No rash noted. Not diaphoretic.   Psych: Normal mood and affect. Behavior is normal.     Emergency Department Course   ECG  ECG obtained at 1131, ECG read at 1132  Atrial fibrillation with RVR  Nonspecific ST and T wave abnormality  Abnormal ECG  Rate 138 bpm. CT interval * ms. QRS duration 88 ms. QT/QTc 302/457 ms. P-R-T axes * 7 226.    Laboratory:  Labs Ordered and Resulted from Time of ED Arrival to Time of ED Departure   BASIC METABOLIC PANEL - Abnormal       Result Value    Sodium 140      Potassium 4.2      Chloride 102      Carbon " Dioxide (CO2) 27      Anion Gap 11      Urea Nitrogen 15.6      Creatinine 1.15      Calcium 9.6      Glucose 134 (*)     GFR Estimate 66     TROPONIN T, HIGH SENSITIVITY - Abnormal    Troponin T, High Sensitivity 26 (*)    MAGNESIUM - Normal    Magnesium 2.2     TSH WITH FREE T4 REFLEX - Normal    TSH 2.37     CBC WITH PLATELETS AND DIFFERENTIAL    WBC Count 9.4      RBC Count 5.52      Hemoglobin 17.3      Hematocrit 50.8      MCV 92      MCH 31.3      MCHC 34.1      RDW 12.7      Platelet Count 232      % Neutrophils 76      % Lymphocytes 14      % Monocytes 8      % Eosinophils 1      % Basophils 1      % Immature Granulocytes 0      NRBCs per 100 WBC 0      Absolute Neutrophils 7.2      Absolute Lymphocytes 1.3      Absolute Monocytes 0.7      Absolute Eosinophils 0.1      Absolute Basophils 0.1      Absolute Immature Granulocytes 0.0      Absolute NRBCs 0.0        Emergency Department Course & Assessments:  Interventions:  Medications   0.9% sodium chloride BOLUS (1,000 mLs Intravenous New Bag 2/5/23 1148)   diltiazem (CARDIZEM) injection 10 mg (10 mg Intravenous Given 2/5/23 1150)     Independent Interpretation (X-rays, CTs, rhythm strip):  Cardiac monitor shows a fib rvr initially and then controlled after the diltiazem bolus.    Consultations/Discussion of Management or Tests:  1237 I consulted with Dr. Krishnan of the hospitalist service and discussed patient admission. They accepted care of the patient.    Social Determinants of Health affecting care:   None    Assessments:  1131 I obtained the history noted above from the patient.     Disposition:  The patient was admitted to the hospital under the care of Dr. Krishnan.     Impression & Plan    Medical Decision Making:  Gilbert Ferrer is a very pleasant 77 year old male who presents for evaluation of syncopal episode and feeling dizzy.  This is consistent with atrial fibrillation with rapid ventricular response.  Based on unknown chronicity of symptoms and  unclear nature of onset, I elected to control rate instead of rhythm control with diltiazem bolus.  He was in atrial fibrillation with a rapid ventricular rate when he presented initially from EMS and had no sensation of palpitations or chest pain.  Because of this, I do not believe cardioversion is indicated and after reviewing the risk and benefit of this with the patient, he was in agreement.  He was then provided 10 mg of diltiazem along with a small fluid bolus.  This was successful in controlling rate. I think less likely this is acute coronary syndrome, thyroid issues, PE, dissection, drug ingestion, acute electrolyte imbalance, etc. the patient's [id number] 2 score is 3 placing him at risk and anticoagulation should be considered.  Regarding the syncope, he slowly lowered himself to the ground with his wife.  There was no injury or trauma from this.  No head injury.  No headache.  No indication for imaging of the brain at this time.  He will be admitted to the hospital for continued evaluation and treatment, echocardiogram, cardiology involvement she reviewed the initiation of anticoagulants.  Will admit to medicine, telemetry, for further cares.  I spoke to the hospitalist, Dr. Novak, who has agreed to admit the patient for continued evaluation and treatment after reviewed this treatment plan with the patient.    Diagnosis:    ICD-10-CM    1. Atrial fibrillation with RVR (H)  I48.91       2. Syncope, unspecified syncope type  R55          Scribe Disclosure:  Pillo PENG, am serving as a scribe at 11:52 AM on 2/5/2023 to document services personally performed by Reginald Workman DO based on my observations and the provider's statements to me.    2/5/2023   Reginald Workman DO Anderson, Robert James, DO  02/05/23 1256

## 2023-02-05 NOTE — PROGRESS NOTES
RECEIVING UNIT ED HANDOFF REVIEW    ED Nurse Handoff Report was reviewed by: Nicki Lee RN on February 5, 2023 at 1:43 PM

## 2023-02-05 NOTE — ED NOTES
Bed: ED04  Expected date:   Expected time:   Means of arrival:   Comments:  Latoya 852 77 M syncope eta 1125

## 2023-02-05 NOTE — PLAN OF CARE
Goal Outcome Evaluation:       Pt VSS on RA. Tele currently SR< converted when arrived to floor. A&Ox4. Up ad luis, using bathroom. Pt started on dilt xl and eliquis. Denies pain. Plan for cards consult and echo. Continue to monitor.

## 2023-02-05 NOTE — PHARMACY-ADMISSION MEDICATION HISTORY
Pharmacy Medication History  Admission medication history interview status for the 2/5/2023  admission is complete. See EPIC admission navigator for prior to admission medications     Location of Interview: Patient room  Medication history sources: Patient and Surescripts    Significant changes made to the medication list:  Added: Super Beta Prostate (nonformulary)  Changed: none  Removed: none    In the past week, patient estimated taking medication this percent of the time: greater than 90%    Medication Affordability:  Not including over the counter (OTC) medications, was there a time in the past 12 months when you did not take your medications as prescribed because of cost?: No    Additional medication history information:   - Takes an OTC supplement called Super Beta Prostate which is a mixture of vitamins and minerals.     Medication reconciliation completed by provider prior to medication history? No    Time spent in this activity: 15 minutes    Prior to Admission medications    Medication Sig Last Dose Taking? Auth Provider Long Term End Date   aspirin 81 MG EC tablet Take 1 tablet (81 mg) by mouth daily 2/5/2023 at morning Yes Sherry Millard PA-C     atorvastatin (LIPITOR) 40 MG tablet Take 1 tablet (40 mg) by mouth daily 2/4/2023 at evening Yes Sherry Millard PA-C Yes    Cholecalciferol (VITAMIN D-3 PO) Take 2,000 Units by mouth daily 2/5/2023 at morning Yes Reported, Patient     NONFORMULARY Take 1 tablet by mouth 2 times daily Super Beta 2/5/2023 at morning Yes Unknown, Entered By History     Ibuprofen (ADVIL PO) Take 200 mg by mouth every 6 hours as needed for moderate pain As needed at As needed  Reported, Patient No      The information provided in this note is only as accurate as the sources available at the time of update(s)

## 2023-02-06 ENCOUNTER — APPOINTMENT (OUTPATIENT)
Dept: CARDIOLOGY | Facility: CLINIC | Age: 78
End: 2023-02-06
Attending: INTERNAL MEDICINE
Payer: COMMERCIAL

## 2023-02-06 ENCOUNTER — APPOINTMENT (OUTPATIENT)
Dept: GENERAL RADIOLOGY | Facility: CLINIC | Age: 78
End: 2023-02-06
Attending: PHYSICIAN ASSISTANT
Payer: COMMERCIAL

## 2023-02-06 VITALS
DIASTOLIC BLOOD PRESSURE: 71 MMHG | SYSTOLIC BLOOD PRESSURE: 131 MMHG | HEIGHT: 70 IN | RESPIRATION RATE: 18 BRPM | HEART RATE: 68 BPM | TEMPERATURE: 97.7 F | OXYGEN SATURATION: 96 % | BODY MASS INDEX: 28.43 KG/M2 | WEIGHT: 198.6 LBS

## 2023-02-06 LAB — LVEF ECHO: NORMAL

## 2023-02-06 PROCEDURE — 999N000208 ECHOCARDIOGRAM COMPLETE

## 2023-02-06 PROCEDURE — 73080 X-RAY EXAM OF ELBOW: CPT | Mod: LT

## 2023-02-06 PROCEDURE — 93306 TTE W/DOPPLER COMPLETE: CPT | Mod: 26 | Performed by: INTERNAL MEDICINE

## 2023-02-06 PROCEDURE — 250N000013 HC RX MED GY IP 250 OP 250 PS 637: Performed by: INTERNAL MEDICINE

## 2023-02-06 PROCEDURE — G0378 HOSPITAL OBSERVATION PER HR: HCPCS

## 2023-02-06 PROCEDURE — 93270 REMOTE 30 DAY ECG REV/REPORT: CPT

## 2023-02-06 PROCEDURE — 99222 1ST HOSP IP/OBS MODERATE 55: CPT | Performed by: INTERNAL MEDICINE

## 2023-02-06 PROCEDURE — 99239 HOSP IP/OBS DSCHRG MGMT >30: CPT | Performed by: PHYSICIAN ASSISTANT

## 2023-02-06 PROCEDURE — 93272 ECG/REVIEW INTERPRET ONLY: CPT | Performed by: INTERNAL MEDICINE

## 2023-02-06 PROCEDURE — 255N000002 HC RX 255 OP 636: Performed by: STUDENT IN AN ORGANIZED HEALTH CARE EDUCATION/TRAINING PROGRAM

## 2023-02-06 RX ORDER — ACYCLOVIR 200 MG/1
10 CAPSULE ORAL ONCE
Status: COMPLETED | OUTPATIENT
Start: 2023-02-06 | End: 2023-02-06

## 2023-02-06 RX ORDER — LORAZEPAM 0.5 MG/1
0.5 TABLET ORAL ONCE
Status: COMPLETED | OUTPATIENT
Start: 2023-02-06 | End: 2023-02-06

## 2023-02-06 RX ORDER — DILTIAZEM HYDROCHLORIDE 180 MG/1
180 CAPSULE, COATED, EXTENDED RELEASE ORAL DAILY
Qty: 30 CAPSULE | Refills: 3 | Status: SHIPPED | OUTPATIENT
Start: 2023-02-07 | End: 2023-06-30

## 2023-02-06 RX ADMIN — HUMAN ALBUMIN MICROSPHERES AND PERFLUTREN 6 ML: 10; .22 INJECTION, SOLUTION INTRAVENOUS at 10:54

## 2023-02-06 RX ADMIN — APIXABAN 5 MG: 5 TABLET, FILM COATED ORAL at 06:40

## 2023-02-06 RX ADMIN — DILTIAZEM HYDROCHLORIDE 180 MG: 180 CAPSULE, COATED, EXTENDED RELEASE ORAL at 08:16

## 2023-02-06 RX ADMIN — ACETAMINOPHEN 650 MG: 325 TABLET, FILM COATED ORAL at 11:11

## 2023-02-06 RX ADMIN — LORAZEPAM 0.5 MG: 0.5 TABLET ORAL at 01:44

## 2023-02-06 RX ADMIN — APIXABAN 5 MG: 5 TABLET, FILM COATED ORAL at 17:04

## 2023-02-06 ASSESSMENT — ACTIVITIES OF DAILY LIVING (ADL)
ADLS_ACUITY_SCORE: 33

## 2023-02-06 NOTE — PLAN OF CARE
A/O, VSS, All discharge instructions, prescriptions, and personal belongings given to pt. All questions answered. Pt d/c to home walking with significant other

## 2023-02-06 NOTE — PLAN OF CARE
Pt here with Afib RVR. A&Ox4. Neuros and CMS intact. VSS. Tele SB/SR. Regular diet. Up independently. Denies pain. Pt scoring green on the Aggression Stop Light Tool. Plan for echo on 2/62. Discharge pending.

## 2023-02-06 NOTE — CONSULTS
Patient has Medicare Advantage through [facility].    Xarelto/Eliquis  --Upon receipt of RX, Discharge Pharmacy can provide 1 mo free.  --Subsequent fills will be $47/mo (or $94 for 3 mo)  --When total drug costs exceed $4,660, price will increase to a 25% coinsurance, equivalent to $151/mo.    Christal Giraldo  Pharmacy Technician/Liaison, Discharge Pharmacy   [phone] (voice or text)  [email or web address]

## 2023-02-06 NOTE — PROVIDER NOTIFICATION
MD Notification    Notified Person: MD    Notified Person Name: Alycia SOPHIE Kenyon    Notification Date/Time: 2-6-2023 1140    Notification Interaction: [employer] messaging    Purpose of Notification: Left elbow is swollen and painful after fall. Do you want x-ray?    Orders Received: Order received for left elbow x-ray.     Comments:

## 2023-02-06 NOTE — PLAN OF CARE
Goal Outcome Evaluation:    VSS. Monitor remains Sinus rhythm. Left elbow is edematous. X-ray done per order. Tylenol given for left elbow discomfort. Continue to monitor. Pt. Wishes to discharge today.

## 2023-02-06 NOTE — PROVIDER NOTIFICATION
MD Notification    Notified Person: MD    Notified Person Name: Ramesh Barroso    Notification Date/Time: 1/25 02/06     Notification Interaction: AMCOM    Purpose of Notification: [id number]  Pt. is having trouble sleeping and is requesting something to help with sleep other than the 1mg melatonin on MAR. Thank you, Lucia RN *[phone]     Orders Received: Ativan    Comments:

## 2023-02-06 NOTE — CONSULTS
CARDIOLOGY CONSULT    REASON FOR CONSULT: paroxysmal atrial fibrillation    PRIMARY CARE PHYSICIAN:  Erica Calloway    HISTORY OF PRESENT ILLNESS:  Mr. Ferrer is a pleasant 77 year old gentleman with PMH significant for peripheral arterial disease who presented yesterday following a pre-syncope/syncopal event at [employer] and found to be in afib with RVR in the ED.  Gilbert states he woke up yesterday and generally did not feel well.  He was at [employer] when he felt warm, light-headed, short of breath.  He went into the lobby and his significant other came to assist him.  He felt nauseated.  She states that he looked poorly and slumped to the ground knocking some things over in the process.   In the ED, he was noted to be in atrial fibrillation with RVR.  HE was started on IV diltiazem and ultimately converted to sinus rhythm.  He was started on oral diltiazem and eliquis.  He does note a similar episode of lightheadedness a year ago.  He has otherwise felt good in recent months.  He continues to work in investing. He walks for exercise.  He denies any exertional chest pain, chest discomfort or shortness of breath.  He denies any heart palpitations.  He has DAVID but cannot tolerate the CPAP. He has not been re-evaluated in 5-10 years.      PAST MEDICAL HISTORY:  1.  Peripheral arterial disease: s/p angioplasty of left tibial peroneal trunk 11/2016  2.  Hyperlipidemia  3.  Elevated PSA  4.  Gilbert's disease  5.  DAVID; not compliant with CPAP      MEDICATIONS:  Current Facility-Administered Medications   Medication     acetaminophen (TYLENOL) tablet 650 mg    Or     acetaminophen (TYLENOL) Suppository 650 mg     apixaban ANTICOAGULANT (ELIQUIS) tablet 5 mg     atorvastatin (LIPITOR) tablet 40 mg     diltiazem ER COATED BEADS (CARDIZEM CD/CARTIA XT) 24 hr capsule 180 mg     melatonin tablet 1 mg     ondansetron (ZOFRAN ODT) ODT tab 4 mg    Or     ondansetron (ZOFRAN) injection 4 mg       ALLERGIES:  Allergies   Allergen  Reactions     Mold        SOCIAL HISTORY:  I have reviewed this patient's social history and updated it with pertinent information if needed. Gilbert Ferrer  reports that he has quit smoking. He has never used smokeless tobacco. He reports that he does not drink alcohol and does not use drugs.    FAMILY HISTORY:  I have reviewed this patient's family history and updated it with pertinent information if needed.   No family history on file.    REVIEW OF SYSTEMS:  A complete ROS was obtained and the pertinent positives are outlined in the history of present illness above.  The remainder of systems is negative.      PHYSICAL EXAM:  Temp: 97.9  F (36.6  C) Temp src: Oral BP: 131/71 Pulse: 72   Resp: 16 SpO2: 97 % O2 Device: None (Room air)    Vital Signs with Ranges  Temp:  [97.5  F (36.4  C)-98.2  F (36.8  C)] 97.9  F (36.6  C)  Pulse:  [] 72  Resp:  [9-31] 16  BP: (114-135)/() 131/71  SpO2:  [96 %-99 %] 97 %  198 lbs 9.6 oz    Constitutional: awake, alert, no distress  Eyes: PERRL, sclera nonicteric  ENT: trachea midline  Respiratory: CTAB  Cardiovascular: RRR no m/r/g, no JVD  GI: nondistended, nontender, bowel sounds present  Lymph/Hematologic: no lymphadenopathy  Skin: dry, no rash  Musculoskeletal: good muscle tone, no edema bilaterally  Neurologic: no focal deficits  Neuropsychiatric: appropriate affact    DATA:  Labs:   Troponin 26-30    EKG: Dated 2/5/23 reviewed personally.  Afib with RVR with  bpm,  No diagnostic ST segment changes.  Subsequent ECG demonstrated sinus rhythm without diagnostic ST segment changes    Echocardiogram:  Images reviewed personally. LV function normal, no significant valve abnormalities.  Formal report pending       ASSESSMENT:  1.  Paroxysmal atrial fibrillation:  Currently in sinus rhythm.  2.  Peripheral arterial disease  3.  Hyperlipidemia:   AT goal on atorvastatin.    4.  DAVID; not compliant with CPAP    RECOMMENDATIONS:  1. Reviewed pathophysiology of atrial  fibrillation, thromboembolic risk and treatment.  [id number] score 3 and thromboembolic risk reduction is warranted.  He has appropriately been started on eliquis and diltiazem  2.  Recommend importance of DAVID treatment; he should follow up with sleep medicine in the outpatient setting  3.  Okay to discharge from a cardiac standpoint with 30 day event monitor at discharge.  Follow up with cardiology in the outpatient setting to be arranged.      Erica Gregg MD [facility]  February 6, 2023

## 2023-02-06 NOTE — DISCHARGE SUMMARY
"[facility]  Hospitalist Discharge Summary      Date of Admission:  2/5/2023  Date of Discharge:  2/6/2023  Discharging Provider: Alycia Kenyon PA-C  Discharge Service: Hospitalist Service    Discharge Diagnoses    New onset Afib with RVR  Syncopal episode due to afib with RVR  Elevated troponin, suspect demand leak  Mild aortic root dilatation 4.2 cm  Mildly ascending aorta dilation 4.1 cm    Follow-ups Needed After Discharge   Follow-up Appointments     Follow-up and recommended labs and tests       Follow up with primary care provider, Erica Calloway, within 7-14 days   to evaluate medication change, to evaluate treatment change, for hospital   follow- up, and regarding new diagnosis.  The following labs/tests are   recommended: follow up how patient is doing since being treated for new   onset Atrial fibrillation.    Follow up with Cardiology in clinic, they will arrange and check in with   you about results of your heart monitor.   Follow up with sleep medicine to have your sleep apnea treated.             Discharge Disposition   Discharged to home  Condition at discharge: Stable    Hospital Course    Gilbert Ferrer is a 77 year old male admitted on 2/5/2023. He presented to the ER after feeling unwell at [facility] and was found to be in afib with RVR.  For full HPI please see admission H&P from Neil Krishnan DO dated 2/5/2023.     New onset Afib with RVR  Syncopal episode due to afib with RVR  Elevated troponin, suspect demand leak  Mild aortic root dilatation 4.2 cm  Mildly ascending aorta dilation 4.1 cm  Presented via EMS from [facility], felt unwell since getting up which got worse at [facility].  He tried to leave and walk around but felt worse and then fell sideways striking his left shoulder and laid down onto ground as he felt quite dizzy. His wife is an RN and reports he looked quite pale and his HR was \"very fast.\"  He denied chest pain. He reportedly never had any ongoing LOC " but he couldn't remember some of the time when he was laying down. He denies any arm pain where he hit his shoulder or any other pain.  He felt well the day prior. No hx known afib. EKG reviewed and consistent. Initially given diltiazem 10 mg IV x 1. HRs 130s-140s, improved but started to creep up.Llater converted to SR. TSH wnl. Trop 26-->30 6 hours later. Remainder of basic labs unremarkable.    Patient's registered to observation and started on oral diltiazem, transition to long-acting.  Initiated on Eliquis as [id number] score of 3. Echocardiogram completed without significant valvular disease. Patient converted to normal sinus rhythm after admission.  Was monitored on telemetry. Echocardiogram completed showing EF 60-65% proximal septal thickening, no significant valve dysfunction, mild aortic root dilatation 4.2 cm, ascending aorta is mildly dilated 4.1 cm. No pericardial effusion.  Seen by cardiology who agreed with initiation of diltiazem and Eliquis, recommended 30-day event monitor on discharge and follow-up with cardiology in outpatient setting.  Per discussion with them, recommended discontinuation of aspirin since starting Eliquis.    Left elbow pain s/p fall: Left elbow XR negative for fracture     Hyperlipidemia: Continue statin     DAVID: Not using CPAP. Sleep study referral, f/u with sleep medicine if already established.    Peripheral arterial disease  Patient reports he has a history of angioplasty of left tibioperoneal trunk, previously on Plavix at one point but discontinued in 2017 and has been maintained on aspirin 81 mg daily since then.      Consultations This Hospital Stay   CARDIOLOGY IP CONSULT  PHARMACY LIAISON FOR MEDICATION COVERAGE CONSULT    Code Status   Full Code    Time Spent on this Encounter   I, Alycia Kenyon PA-C, personally saw the patient today and spent greater than 30 minutes discharging this patient.       Alycia Kenyon PA-C  [facility]  [facility]  [address]  [place] [address]  Phone: [phone]  ______________________________________________________________________    Physical Exam   Vital Signs: Temp: 97.7  F (36.5  C) Temp src: Oral BP: 131/71 Pulse: 68   Resp: 18 SpO2: 96 % O2 Device: None (Room air)    Weight: 198 lbs 9.6 oz    General: Awake, alert, very pleasant man who appears stated age. Looks comfortable ambulating room. No acute distress.  HEENT: Normocephalic, atraumatic. Extraocular movements intact.   Respiratory: Clear to auscultation bilaterally, no rales, wheezing, or rhonchi.  Cardiovascular: Regular rate and rhythm, +S1 and S2, no murmur auscultated. No peripheral edema.   Gastrointestinal: Soft, non-tender, non-distended. Bowel sounds present.  Skin: Warm, dry. No obvious rashes or lesions on exposed skin. Dorsalis pedis pulses palpable bilaterally.  Musculoskeletal: No joint swelling, erythema or tenderness. Moves all extremities equally.  Neurologic: AAO x3.   Psychiatric: Appropriate mood and affect. No obvious anxiety or depression.       Primary Care Physician   Erica Calloway    Discharge Orders      Follow-Up with Cardiology [facility]      Sleep Study Referral      Reason for your hospital stay    You were admitted with a fall and new onset atrial fibrillation. You started a blood thinner and a new medication called diltiazem to control your heart rate.     Activity    Your activity upon discharge: activity as tolerated     Follow-up and recommended labs and tests     Follow up with primary care provider, Erica Calloway, within 7-14 days to evaluate medication change, to evaluate treatment change, for hospital follow- up, and regarding new diagnosis.  The following labs/tests are recommended: follow up how patient is doing since being treated for new onset Atrial fibrillation.    Follow up with Cardiology in clinic, they will arrange and check in with you about results of your heart monitor.    Follow up with sleep medicine to have your sleep apnea treated.     Discharge Instructions    Your super beta prostate supplement should be ok to continue to take.  Stop aspirin per cardiology recommendations, since starting blood thinner.  Avoid NSAIDs such as ibuprofen and other blood thinners like fish oil while on blood thinners.     Cardiac Event Monitor Adult Pediatric     Diet    Follow this diet upon discharge: Orders Placed This Encounter      Regular Diet Adult       Significant Results and Procedures   Results for orders placed or performed during the hospital encounter of 23   XR Elbow Left G/E 3 Views    Narrative    XR ELBOW LEFT G/E 3 VIEWS 2023 2:15 PM     HISTORY: swollen and painful left elbow, fall yesterday    COMPARISON: None.      Impression    IMPRESSION: Dorsal soft tissue swelling of the olecranon consistent  with recent injury. No fractures are evident. Normal alignment. No  elbow joint effusion.     BETH WEBER MD         SYSTEM ID:  [id number]   Echocardiogram Complete     Value    LVEF  60-65%    Narrative    [id number]  [id number]  [id number]  [id number]^OPAL^BAY^ALEX     [facility]  Echocardiography Laboratory  [address] [place] [address]     Name: BARBRA EPREZ  MRN: [id number]  : 1945  Study Date: 2023 10:37 AM  Age: 77 yrs  Gender: Male  Patient [facility]  Reason For Study: Atrial Fibrillation  Ordering Physician: BAY JOSEPH  Referring Physician: BAY JOSEPH  Performed By: April Alaniz     BSA: 2.1 m2  Height: 70 in  Weight: 195 lb  HR: 64  ______________________________________________________________________________  Procedure  Complete Echo Adult. Optison (NDC #[id number]) given intravenously.  ______________________________________________________________________________  Interpretation Summary     Left ventricular systolic function is normal.  The visual ejection fraction is 60-65%.  Proximal septal  thickening is noted.  No significant valve dysfunction.  Aortic valve is trileaflet.  Mild aortic root dilatation (4.2 cm).  The ascending aorta is Mildly dilated (4.1 cm).  The inferior vena cava was normal in size with preserved respiratory  variability.  There is no pericardial effusion.  Rhythm is atrial fibrillation.     No prior study for comparison.  ______________________________________________________________________________  Left Ventricle  The left ventricle is normal in size. Proximal septal thickening is noted.  Left ventricular systolic function is normal. The visual ejection fraction is  60-65%. Left ventricular diastolic function is indeterminate. Normal left  ventricular wall motion.     Right Ventricle  The right ventricle is normal size. The right ventricular systolic function is  normal.     Atria  Normal left atrial size. Right atrial size is normal.     Mitral Valve  There is mild mitral annular calcification. There is trace mitral  regurgitation.     Tricuspid Valve  The tricuspid valve is normal in structure and function. There is mild (1+)  tricuspid regurgitation.     Aortic Valve  The aortic valve is normal in structure and function. There is mild (1+)  aortic regurgitation.     Pulmonic Valve  The pulmonic valve is normal in structure and function. There is trace  pulmonic valvular regurgitation.     Vessels  Mild aortic root dilatation. The ascending aorta is Mildly dilated. The  inferior vena cava was normal in size with preserved respiratory variability.     Pericardium  There is no pericardial effusion.     Rhythm  The rhythm was atrial fibrillation.  ______________________________________________________________________________  MMode/2D Measurements & Calculations     IVSd: 1.3 cm  LVIDd: 4.8 cm  LVIDs: 3.3 cm  LVPWd: 0.93 cm  FS: 30.2 %  LV mass(C)d: 197.3 grams  LV mass(C)dI: 95.6 grams/m2  Ao root diam: 4.2 cm  asc Aorta Diam: 4.1 cm  LVOT diam: 2.3 cm  LVOT area: 4.1 cm2  LA  Volume (BP): 69.8 ml  LA Volume Index (BP): 33.9 ml/m2  RWT: 0.39     Doppler Measurements & Calculations  MV E max ben: 62.9 cm/sec  MV A max ben: 70.6 cm/sec  MV E/A: 0.89  MV dec slope: 287.0 cm/sec2  MV dec time: 0.22 sec  Ao V2 max: 112.9 cm/sec  Ao max P.0 mmHg  BARRY(V,D): 3.6 cm2  LV V1 max PG: 3.8 mmHg  LV V1 max: 97.0 cm/sec  LV V1 VTI: 22.5 cm  SV(LVOT): 93.2 ml  SI(LVOT): 45.1 ml/m2  PA V2 max: 90.9 cm/sec  PA max PG: 3.3 mmHg  PA acc time: 0.11 sec  AV Ben Ratio (DI): 0.86  Lateral E/e': 7.1     ______________________________________________________________________________  Report approved by: Mary Bourne 2023 02:53 PM               Discharge Medications   Current Discharge Medication List      START taking these medications    Details   apixaban ANTICOAGULANT (ELIQUIS) 5 MG tablet Take 1 tablet (5 mg) by mouth 2 times daily  Qty: 60 tablet, Refills: 3    Comments: Future refills by PCP Dr. Erica Calloway with phone number [phone].  Associated Diagnoses: Atrial fibrillation with RVR (H)      diltiazem ER COATED BEADS (CARDIZEM CD/CARTIA XT) 180 MG 24 hr capsule Take 1 capsule (180 mg) by mouth daily  Qty: 30 capsule, Refills: 3    Comments: Future refills by PCP Dr. Erica Calloway with phone number [phone].  Associated Diagnoses: Atrial fibrillation with RVR (H)         CONTINUE these medications which have NOT CHANGED    Details   atorvastatin (LIPITOR) 40 MG tablet Take 1 tablet (40 mg) by mouth daily  Qty: 90 tablet, Refills: 3    Associated Diagnoses: Hyperlipidemia LDL goal <70      Cholecalciferol (VITAMIN D-3 PO) Take 2,000 Units by mouth daily      NONFORMULARY Take 1 tablet by mouth 2 times daily Super Beta         STOP taking these medications       aspirin 81 MG EC tablet Comments:   Reason for Stopping:         Ibuprofen (ADVIL PO) Comments:   Reason for Stopping:             Allergies   Allergies   Allergen Reactions     Mold

## 2023-02-08 LAB
ATRIAL RATE - MUSE: 67 BPM
DIASTOLIC BLOOD PRESSURE - MUSE: NORMAL MMHG
INTERPRETATION ECG - MUSE: NORMAL
P AXIS - MUSE: 34 DEGREES
PR INTERVAL - MUSE: 174 MS
QRS DURATION - MUSE: 78 MS
QT - MUSE: 384 MS
QTC - MUSE: 405 MS
R AXIS - MUSE: 42 DEGREES
SYSTOLIC BLOOD PRESSURE - MUSE: NORMAL MMHG
T AXIS - MUSE: 72 DEGREES
VENTRICULAR RATE- MUSE: 67 BPM

## 2023-03-20 ENCOUNTER — OFFICE VISIT (OUTPATIENT)
Dept: CARDIOLOGY | Facility: CLINIC | Age: 78
End: 2023-03-20
Attending: INTERNAL MEDICINE
Payer: COMMERCIAL

## 2023-03-20 VITALS
WEIGHT: 207.4 LBS | SYSTOLIC BLOOD PRESSURE: 144 MMHG | HEART RATE: 51 BPM | DIASTOLIC BLOOD PRESSURE: 90 MMHG | BODY MASS INDEX: 29.69 KG/M2 | HEIGHT: 70 IN

## 2023-03-20 DIAGNOSIS — I48.91 ATRIAL FIBRILLATION WITH RVR (H): ICD-10-CM

## 2023-03-20 PROCEDURE — 99214 OFFICE O/P EST MOD 30 MIN: CPT | Performed by: INTERNAL MEDICINE

## 2023-03-20 RX ORDER — CARVEDILOL 12.5 MG/1
TABLET ORAL
COMMUNITY
End: 2023-06-30

## 2023-03-20 NOTE — PROGRESS NOTES
HPI and Plan:   It is a pleasure for me to see this very pleasant 77-year-old gentleman who is accompanied by his wife today.  He is here for follow-up of paroxysmal atrial fibrillation.    The above is a new diagnosis for him.  He was hospitalized briefly when he had near syncope due to A-fib with RVR.  Personally reviewed his rhythm strips and his heart rate was around 130 upon presentation.  Starting on diltiazem drip he spontaneously reverted back to normal sinus rhythm.  In hospital he was started on Eliquis as his [id number] score is 3-4.  He does have a history of peripheral arterial disease as he had angioplasty of his left tibial peroneal trunk in 2016.  He showed me some blood pressure readings at home and they are around 1 30-1 50.  He has been told he may be hypertensive in the past.    He has untreated sleep apnea as he was not able to tolerate CPAP mask I informed him that hopefully with treatment of his DAVID episodes of future recurrence of atrial fibrillation could be less.  He will try the inspire device.    Since hospital discharge she has not noticed any symptomatic recurrences of atrial fibrillation.  Indeed his cardiac examination is normal today.    He tells me that he only feels that 80% of what he was prior to hospitalization.  By this he means fatigue.  Otherwise he has no specific symptoms.  Initially was started diltiazem may be the cause of his fatigue but after switching to Coreg the symptoms persist.    Both diltiazem and especially Coreg may cause fatigue.  The only way to find out is to have a therapeutic holiday of 7 to 10 days.  I did inform him and his wife that there is a good chance he may need antihypertensive therapy and he will continue to monitor his blood pressure during this time.  He will give us a call to update his symptoms and blood pressure at the end of the drug holiday.    Pill in the pocket approach could be used if there are future symptomatic recurrences.  There  is certainly a good possibility of this and the possibility of him being in permanent atrial fibrillation in the next 5 years or so is also significant.    The thoracic aorta was only mildly enlarged and if we keep his blood pressure under good control there is a very good possibility that it may never need to be intervened upon.  Nonetheless we will follow with serial echocardiography.    He is on 40 mg of atorvastatin and his total cholesterol is 236 with an LDL of 147.  These are certainly very unfavorable numbers with his history of peripheral arterial disease.  I will work on this at a later date possibly with the addition of Zetia or perhaps even a PCSK9 inhibitor.  I will address this after his therapeutic holiday.    I have provisionally made arrangements to see him again in 3 months time.    No orders of the defined types were placed in this encounter.      Orders Placed This Encounter   Medications     carvedilol (COREG) 12.5 MG tablet     Sig: carvedilol 12.5 mg tablet   TAKE 1 TABLET (12.5 MG) BY MOUTH TWO TIMES DAILY WITH MEALS.       Encounter Diagnosis   Name Primary?     Atrial fibrillation with RVR (H)        CURRENT MEDICATIONS:  Current Outpatient Medications   Medication Sig Dispense Refill     apixaban ANTICOAGULANT (ELIQUIS) 5 MG tablet Take 1 tablet (5 mg) by mouth 2 times daily 60 tablet 3     atorvastatin (LIPITOR) 40 MG tablet Take 1 tablet (40 mg) by mouth daily 90 tablet 3     carvedilol (COREG) 12.5 MG tablet carvedilol 12.5 mg tablet   TAKE 1 TABLET (12.5 MG) BY MOUTH TWO TIMES DAILY WITH MEALS.       Cholecalciferol (VITAMIN D-3 PO) Take 2,000 Units by mouth daily       NONFORMULARY Take 1 tablet by mouth 2 times daily Super Beta       diltiazem ER COATED BEADS (CARDIZEM CD/CARTIA XT) 180 MG 24 hr capsule Take 1 capsule (180 mg) by mouth daily (Patient not taking: Reported on 3/20/2023) 30 capsule 3       ALLERGIES     Allergies   Allergen Reactions     Mold        PAST MEDICAL  HISTORY:  Past Medical History:   Diagnosis Date     Elevated PSA      Gilbert's disease      Hyperlipidemia      Left rotator cuff tear      DAVID (obstructive sleep apnea)      PAD (peripheral artery disease) (H)      Sleep apnea     DOES NOT USE CPAP     Spermatocele     Bilateral     Stented coronary artery      Umbilical hernia      Vitamin D deficiency      Vitamin D deficiency        PAST SURGICAL HISTORY:  Past Surgical History:   Procedure Laterality Date     CARDIAC SURGERY      STENT     COLONOSCOPY       HERNIORRHAPHY VENTRAL N/A 3/18/2016    Procedure: HERNIORRHAPHY VENTRAL;  Surgeon: Nicola Rivera MD;  Location:  [facility]     PHACOEMULSIFICATION CLEAR CORNEA WITH STANDARD INTRAOCULAR LENS IMPLANT Left 6/28/2017    Procedure: PHACOEMULSIFICATION CLEAR CORNEA WITH STANDARD INTRAOCULAR LENS IMPLANT;  LEFT EYE PHACOEMULSIFICATION CLEAR CORNEA WITH STANDARD INTRAOCULAR LENS IMPLANT ;  Surgeon: Yifan Arroyo MD;  Location:  [facility]     PHACOEMULSIFICATION CLEAR CORNEA WITH STANDARD INTRAOCULAR LENS IMPLANT Right 7/26/2017    Procedure: PHACOEMULSIFICATION CLEAR CORNEA WITH STANDARD INTRAOCULAR LENS IMPLANT;  RIGHT EYE PHACOEMULSIFICATION CLEAR CORNEA WITH STANDARD INTRAOCULAR LENS IMPLANT ;  Surgeon: Yifan Arroyo MD;  Location: [facility]       FAMILY HISTORY:  History reviewed. No pertinent family history.    SOCIAL HISTORY:  Social History     Socioeconomic History     Marital status:      Spouse name: None     Number of children: None     Years of education: None     Highest education level: None   Tobacco Use     Smoking status: Former     Smokeless tobacco: Never     Tobacco comments:     QUIT 1967   Substance and Sexual Activity     Alcohol use: No     Alcohol/week: 0.0 standard drinks     Comment: NONE SINCE AGE 50     Drug use: No       Review of Systems:  Skin:  Negative     Eyes:    glasses  ENT:  Negative    Respiratory:  Positive for sleep apnea  Cardiovascular:  Negative   "  Gastroenterology: Negative    Genitourinary:  Negative    Musculoskeletal:  Negative    Neurologic:  Negative    Psychiatric:  Negative    Heme/Lymph/Imm:  Negative    Endocrine:  Negative      Physical Exam:  Vitals: BP (!) 144/90   Pulse 51   Ht 1.778 m (5' 10\")   Wt 94.1 kg (207 lb 6.4 oz)   BMI 29.76 kg/m      Constitutional:  cooperative, alert and oriented, well developed, well nourished, in no acute distress        Skin:  warm and dry to the touch, no apparent skin lesions or masses noted          Head:  normocephalic, no masses or lesions        Eyes:  pupils equal and round, conjunctivae and lids unremarkable, sclera white, no xanthalasma, EOMS intact, no nystagmus        Lymph:No Cervical lymphadenopathy present     ENT:           Neck:  carotid pulses are full and equal bilaterally, JVP normal, no carotid bruit        Respiratory:  normal breath sounds, clear to auscultation, normal A-P diameter, normal symmetry, normal respiratory excursion, no use of accessory muscles         Cardiac: regular rhythm, normal S1/S2, no S3 or S4, apical impulse not displaced, no murmurs, gallops or rubs                pulses full and equal, no bruits auscultated                                        GI:  abdomen soft, non-tender, BS normoactive, no mass, no HSM, no bruits        Extremities and Muscular Skeletal:  no deformities, clubbing, cyanosis, erythema observed              Neurological:  no gross motor deficits        Psych:  Alert and Oriented x 3        Recent Lab Results:  LIPID RESULTS:  Lab Results   Component Value Date    CHOL 236 (H) 11/18/2016    HDL 44 11/18/2016     (H) 11/18/2016    TRIG 227 (H) 11/18/2016       LIVER ENZYME RESULTS:  No results found for: AST, ALT    CBC RESULTS:  Lab Results   Component Value Date    WBC 9.4 02/05/2023    WBC 7.5 11/18/2016    RBC 5.52 02/05/2023    RBC 5.02 11/18/2016    HGB 17.3 02/05/2023    HGB 15.9 11/18/2016    HCT 50.8 02/05/2023    HCT 45.7 " 11/18/2016    MCV 92 02/05/2023    MCV 91 11/18/2016    MCH 31.3 02/05/2023    MCH 31.7 11/18/2016    MCHC 34.1 02/05/2023    MCHC 34.8 11/18/2016    RDW 12.7 02/05/2023    RDW 12.8 11/18/2016     02/05/2023     11/18/2016       BMP RESULTS:  Lab Results   Component Value Date     02/05/2023    POTASSIUM 4.2 02/05/2023    CHLORIDE 102 02/05/2023    CO2 27 02/05/2023    ANIONGAP 11 02/05/2023     (H) 02/05/2023    BUN 15.6 02/05/2023    CR 1.15 02/05/2023    CR 1.03 11/18/2016    GFRESTIMATED 66 02/05/2023    GFRESTIMATED 71 11/18/2016    GFRESTBLACK 86 11/18/2016    BOZENA 9.6 02/05/2023        A1C RESULTS:  Lab Results   Component Value Date    A1C 5.5 11/18/2016       INR RESULTS:  Lab Results   Component Value Date    INR 0.95 11/18/2016           CC  Erica Gregg MD  [address] [facility] [address]  [place] [id number]

## 2023-03-20 NOTE — LETTER
3/20/2023    Mio Mora, DO, DO  [address]  [facility] [address]    RE: Gilbert Ferrer       Dear Colleague,     I had the pleasure of seeing Gilbert Ferrer in the [facility].  HPI and Plan:   It is a pleasure for me to see this very pleasant 77-year-old gentleman who is accompanied by his wife today.  He is here for follow-up of paroxysmal atrial fibrillation.    The above is a new diagnosis for him.  He was hospitalized briefly when he had near syncope due to A-fib with RVR.  Personally reviewed his rhythm strips and his heart rate was around 130 upon presentation.  Starting on diltiazem drip he spontaneously reverted back to normal sinus rhythm.  In hospital he was started on Eliquis as his [id number] score is 3-4.  He does have a history of peripheral arterial disease as he had angioplasty of his left tibial peroneal trunk in 2016.  He showed me some blood pressure readings at home and they are around 1 30-1 50.  He has been told he may be hypertensive in the past.    He has untreated sleep apnea as he was not able to tolerate CPAP mask I informed him that hopefully with treatment of his DAVID episodes of future recurrence of atrial fibrillation could be less.  He will try the inspire device.    Since hospital discharge she has not noticed any symptomatic recurrences of atrial fibrillation.  Indeed his cardiac examination is normal today.    He tells me that he only feels that 80% of what he was prior to hospitalization.  By this he means fatigue.  Otherwise he has no specific symptoms.  Initially was started diltiazem may be the cause of his fatigue but after switching to Coreg the symptoms persist.    Both diltiazem and especially Coreg may cause fatigue.  The only way to find out is to have a therapeutic holiday of 7 to 10 days.  I did inform him and his wife that there is a good chance he may need antihypertensive therapy and he will continue to monitor his blood pressure  during this time.  He will give us a call to update his symptoms and blood pressure at the end of the drug holiday.    Pill in the pocket approach could be used if there are future symptomatic recurrences.  There is certainly a good possibility of this and the possibility of him being in permanent atrial fibrillation in the next 5 years or so is also significant.    The thoracic aorta was only mildly enlarged and if we keep his blood pressure under good control there is a very good possibility that it may never need to be intervened upon.  Nonetheless we will follow with serial echocardiography.    He is on 40 mg of atorvastatin and his total cholesterol is 236 with an LDL of 147.  These are certainly very unfavorable numbers with his history of peripheral arterial disease.  I will work on this at a later date possibly with the addition of Zetia or perhaps even a PCSK9 inhibitor.  I will address this after his therapeutic holiday.    I have provisionally made arrangements to see him again in 3 months time.    No orders of the defined types were placed in this encounter.      Orders Placed This Encounter   Medications     carvedilol (COREG) 12.5 MG tablet     Sig: carvedilol 12.5 mg tablet   TAKE 1 TABLET (12.5 MG) BY MOUTH TWO TIMES DAILY WITH MEALS.       Encounter Diagnosis   Name Primary?     Atrial fibrillation with RVR (H)        CURRENT MEDICATIONS:  Current Outpatient Medications   Medication Sig Dispense Refill     apixaban ANTICOAGULANT (ELIQUIS) 5 MG tablet Take 1 tablet (5 mg) by mouth 2 times daily 60 tablet 3     atorvastatin (LIPITOR) 40 MG tablet Take 1 tablet (40 mg) by mouth daily 90 tablet 3     carvedilol (COREG) 12.5 MG tablet carvedilol 12.5 mg tablet   TAKE 1 TABLET (12.5 MG) BY MOUTH TWO TIMES DAILY WITH MEALS.       Cholecalciferol (VITAMIN D-3 PO) Take 2,000 Units by mouth daily       NONFORMULARY Take 1 tablet by mouth 2 times daily Super Beta       diltiazem ER COATED BEADS (CARDIZEM  CD/CARTIA XT) 180 MG 24 hr capsule Take 1 capsule (180 mg) by mouth daily (Patient not taking: Reported on 3/20/2023) 30 capsule 3       ALLERGIES     Allergies   Allergen Reactions     Mold        PAST MEDICAL HISTORY:  Past Medical History:   Diagnosis Date     Elevated PSA      Gilbert's disease      Hyperlipidemia      Left rotator cuff tear      DAVID (obstructive sleep apnea)      PAD (peripheral artery disease) (H)      Sleep apnea     DOES NOT USE CPAP     Spermatocele     Bilateral     Stented coronary artery      Umbilical hernia      Vitamin D deficiency      Vitamin D deficiency        PAST SURGICAL HISTORY:  Past Surgical History:   Procedure Laterality Date     CARDIAC SURGERY      STENT     COLONOSCOPY       HERNIORRHAPHY VENTRAL N/A 3/18/2016    Procedure: HERNIORRHAPHY VENTRAL;  Surgeon: Nicola Rivera MD;  Location:  [facility]     PHACOEMULSIFICATION CLEAR CORNEA WITH STANDARD INTRAOCULAR LENS IMPLANT Left 6/28/2017    Procedure: PHACOEMULSIFICATION CLEAR CORNEA WITH STANDARD INTRAOCULAR LENS IMPLANT;  LEFT EYE PHACOEMULSIFICATION CLEAR CORNEA WITH STANDARD INTRAOCULAR LENS IMPLANT ;  Surgeon: Yifan Arroyo MD;  Location:  [facility]     PHACOEMULSIFICATION CLEAR CORNEA WITH STANDARD INTRAOCULAR LENS IMPLANT Right 7/26/2017    Procedure: PHACOEMULSIFICATION CLEAR CORNEA WITH STANDARD INTRAOCULAR LENS IMPLANT;  RIGHT EYE PHACOEMULSIFICATION CLEAR CORNEA WITH STANDARD INTRAOCULAR LENS IMPLANT ;  Surgeon: Yifan Arroyo MD;  Location: [facility]       FAMILY HISTORY:  History reviewed. No pertinent family history.    SOCIAL HISTORY:  Social History     Socioeconomic History     Marital status:      Spouse name: None     Number of children: None     Years of education: None     Highest education level: None   Tobacco Use     Smoking status: Former     Smokeless tobacco: Never     Tobacco comments:     QUIT 1967   Substance and Sexual Activity     Alcohol use: No     Alcohol/week: 0.0  "standard drinks     Comment: NONE SINCE AGE 50     Drug use: No       Review of Systems:  Skin:  Negative     Eyes:    glasses  ENT:  Negative    Respiratory:  Positive for sleep apnea  Cardiovascular:  Negative    Gastroenterology: Negative    Genitourinary:  Negative    Musculoskeletal:  Negative    Neurologic:  Negative    Psychiatric:  Negative    Heme/Lymph/Imm:  Negative    Endocrine:  Negative      Physical Exam:  Vitals: BP (!) 144/90   Pulse 51   Ht 1.778 m (5' 10\")   Wt 94.1 kg (207 lb 6.4 oz)   BMI 29.76 kg/m      Constitutional:  cooperative, alert and oriented, well developed, well nourished, in no acute distress        Skin:  warm and dry to the touch, no apparent skin lesions or masses noted          Head:  normocephalic, no masses or lesions        Eyes:  pupils equal and round, conjunctivae and lids unremarkable, sclera white, no xanthalasma, EOMS intact, no nystagmus        Lymph:No Cervical lymphadenopathy present     ENT:           Neck:  carotid pulses are full and equal bilaterally, JVP normal, no carotid bruit        Respiratory:  normal breath sounds, clear to auscultation, normal A-P diameter, normal symmetry, normal respiratory excursion, no use of accessory muscles         Cardiac: regular rhythm, normal S1/S2, no S3 or S4, apical impulse not displaced, no murmurs, gallops or rubs                pulses full and equal, no bruits auscultated                                        GI:  abdomen soft, non-tender, BS normoactive, no mass, no HSM, no bruits        Extremities and Muscular Skeletal:  no deformities, clubbing, cyanosis, erythema observed              Neurological:  no gross motor deficits        Psych:  Alert and Oriented x 3        Recent Lab Results:  LIPID RESULTS:  Lab Results   Component Value Date    CHOL 236 (H) 11/18/2016    HDL 44 11/18/2016     (H) 11/18/2016    TRIG 227 (H) 11/18/2016       LIVER ENZYME RESULTS:  No results found for: AST, ALT    CBC " RESULTS:  Lab Results   Component Value Date    WBC 9.4 02/05/2023    WBC 7.5 11/18/2016    RBC 5.52 02/05/2023    RBC 5.02 11/18/2016    HGB 17.3 02/05/2023    HGB 15.9 11/18/2016    HCT 50.8 02/05/2023    HCT 45.7 11/18/2016    MCV 92 02/05/2023    MCV 91 11/18/2016    MCH 31.3 02/05/2023    MCH 31.7 11/18/2016    MCHC 34.1 02/05/2023    MCHC 34.8 11/18/2016    RDW 12.7 02/05/2023    RDW 12.8 11/18/2016     02/05/2023     11/18/2016       BMP RESULTS:  Lab Results   Component Value Date     02/05/2023    POTASSIUM 4.2 02/05/2023    CHLORIDE 102 02/05/2023    CO2 27 02/05/2023    ANIONGAP 11 02/05/2023     (H) 02/05/2023    BUN 15.6 02/05/2023    CR 1.15 02/05/2023    CR 1.03 11/18/2016    GFRESTIMATED 66 02/05/2023    GFRESTIMATED 71 11/18/2016    GFRESTBLACK 86 11/18/2016    BOZENA 9.6 02/05/2023        A1C RESULTS:  Lab Results   Component Value Date    A1C 5.5 11/18/2016       INR RESULTS:  Lab Results   Component Value Date    INR 0.95 11/18/2016       CC  Erica Gregg MD  [address] [address]    Thank you for allowing me to participate in the care of your patient.      Sincerely,     DR ALLISON LEON MD     [facility]

## 2023-03-28 ENCOUNTER — TELEPHONE (OUTPATIENT)
Dept: CARDIOLOGY | Facility: CLINIC | Age: 78
End: 2023-03-28
Payer: COMMERCIAL

## 2023-03-28 NOTE — TELEPHONE ENCOUNTER
Patient's spouse called in late yesterday to report BP's taken off of both Diltiazem 180 MG and Coreg 12.5 MG BID since 3/20 after their visit with Dr. Hollis. The drug holiday was due to patient's complaint of fatigue.    His fatigue has improved significantly. BP's are averaging 140/70 with the highest reading  being 150/78 and the lowest 123/68 with HR's 56-72 bpm.      I told her that I will review with Dr. Hollis and and then get back with them with his recommendations.

## 2023-03-28 NOTE — TELEPHONE ENCOUNTER
Telma, MD Roberto Amato William W. RN  Caller: Unspecified (Today,  8:29 AM)  BP ok at these levels.  He may need these meds in future if and when he has afib with RVR again.  Pls ask him to get his DAVID treated ASAP:  treating this will also help to lower BP.  Thanks.                    I phoned patient and reviewed in detail Dr. Hollis's recommendations.    They told me that have an appointment to get a at home sleep study to see if he qualifies for the new device to treat sleep apnea.    I also gave BP guidelines saying that if they see the SBP consistently trending >150 to call us.    I also reviewed afb and its treatment and when to call us if he does fall into this.    All questions and concerns were addressed to their satisfaction.

## 2023-04-05 ENCOUNTER — TELEPHONE (OUTPATIENT)
Dept: CARDIOLOGY | Facility: CLINIC | Age: 78
End: 2023-04-05
Payer: COMMERCIAL

## 2023-04-05 DIAGNOSIS — I10 BENIGN ESSENTIAL HYPERTENSION: Primary | ICD-10-CM

## 2023-04-05 RX ORDER — AMLODIPINE BESYLATE 5 MG/1
5 TABLET ORAL DAILY
Qty: 30 TABLET | Refills: 3 | Status: SHIPPED | OUTPATIENT
Start: 2023-04-05 | End: 2023-06-30

## 2023-04-05 NOTE — TELEPHONE ENCOUNTER
Received call from Gilbert's wife, Gloria.  She reports that it has now been 2 weeks since Gilbert stopped taking Carvedilol, so he is not on any medication for his blood pressure.  The instructions from Dr. Hollis were to call if BP systolic is continuing over 150.    Past several days readings:   163\87, 160\90, 156\89, 156\90, 164\90.   His pulse has remained between 60 - 70.    They are still waiting for the sleep study equipment to arrive at their home, he has tried conventional CPAP and because of claustrophobia did not tolerate.  They have been in touch with [facility] and are supposedly waiting for insurance auth for this next type of sleep study.   They are aware that BP will come down with treatment of sleep apnea.    Now wondering if he needs to try a low dose of something for high BP, and what might be better tolerated by Gilbert.  The Cardizem long acting, and the Carvedilol apparently made him too lethargic and feeling like a wet noodle, he could not tolerate.    Writer has routed to Dr. Hollis for recommendations.    Anamika Holland RN on 4/5/2023 at 10:40 AM

## 2023-04-05 NOTE — TELEPHONE ENCOUNTER
"Received instructions back from Dr. Hollis, patient should start on Amlodipine 5mg tablet per day.  Continue to monitor BP readings.    Spoke with Gilbert, he said that he does understand but he would prefer to not have to take any medications, but he will follow the order that Dr. Hollis instructed.  He did ask to have us contact his wife for all things medical, because \"she is the retired nurse and knows what is going on\".  Writer then called his wife, but had to LVM.    Writer has sent to [facility] pharmacy, patient preference is start with 30 days just in case he doesn't tolerate this medication.    Anamika Holland RN on 4/5/2023 at 1:51 PM    "

## 2023-05-26 ENCOUNTER — TELEPHONE (OUTPATIENT)
Dept: CARDIOLOGY | Facility: CLINIC | Age: 78
End: 2023-05-26
Payer: COMMERCIAL

## 2023-05-26 DIAGNOSIS — I48.91 ATRIAL FIBRILLATION WITH RVR (H): Primary | ICD-10-CM

## 2023-05-26 NOTE — TELEPHONE ENCOUNTER
"Received call from Gilbert and wife Gloria.  The message is that Gilbert has no further refills of Eliquis, which was started in the hospital in February, when Dr. Hollis saw him at [facility] for Atrial Fib with RVR.        Writer has called and spoke with pharmacy.  It appears that original prescription was for one month supply at at time, so they are out of refills now.      Writer will send new Rx with 30 day fill, as patient requested, with 11 refills due to cost.    Chelsy would also like to speak with our patient assistance nurse, Juan Olivas, to see if there are any options for lower cost.  Message sent to Andree.      Anamika Holland RN on 5/26/2023 at 4:30 PM        **6-8-2023  I spoke w/ pts wife Gloria about the Eliquis assistance program-they would Not qualify, as they are above what the income guidelines allow for a family of 2.(38,775 annual)  Gloria did not think that they had ever used a \"30 day free\" card, so I mailed one out to them.  During my talk with her, she wanted to update  That Gilbert continues to feel fatigue, he has a \"sleep\" visit next week, and a Follow-up w/ Dr Hollis in July, she may try to move up  The cardiology appt.    mmunns lpn    "

## 2023-06-12 ENCOUNTER — TELEPHONE (OUTPATIENT)
Dept: CARDIOLOGY | Facility: CLINIC | Age: 78
End: 2023-06-12
Payer: COMMERCIAL

## 2023-06-12 NOTE — TELEPHONE ENCOUNTER
"Writer spoke with Dr. Hollis regarding Gilbert's situation.  Dr. Hollis requested if we could find an appointment with an [facility], or if we can't we could use one of Dr. Hollis's urgent spots.  Gilbert will need a thorough discussion about a cardioversion and possibly a referral to EP if he wants to pursue rhythm control medications.    Writer spoke with Gilbert, and he reports that he feels fatigued, not able to do his usual activities because he just doesn't have the stamina like he should.  He reports that he doesn't feel \"horrible\" but that he does not feel \"right\" either.  He does not like the way he is feeling, and would like to have his wife discuss further what the options are.    Writer then spoke with Chelsy.  She reports Gilbert has been on the Eliquis continually since hospitalization in February.  He has not missed any doses.  Writer informed her that cardioversion is an option, and that Gilbert would be a candidate.  Explained that Atrial Fib by itself is not life-threatening rhythm, and she said that she is aware but the longer he is in constant A fib, the more concerned and anxious she is getting.  She politely declined the appointment with the [facility] right now, but I said that if Gilbert does elect cardioversion, he would need to be seen for a risk and benefit discussion by a provider before he could have it.    Chelsy said she is not sure Gilbert would elect cardioversion, but she requested that I ask Dr. Hollis about it and get back to her.    Routing to Dr. Hollis.    Anamika Holland RN on 6/12/2023 at 12:27 PM    "

## 2023-06-12 NOTE — TELEPHONE ENCOUNTER
"Received call from Gilbert's wife, Chelsy.  She states that Gilbert went into atrial fib yesterday.  Pulse in the low 120's  BP yesterday was 111\71.    Today, he is still in atrial fib, with heart rate in the 60s.  \74.  He feels very fatigued when in A. Fib.    No other symptoms at this time.  They are wondering if there is anything they should \"do\" about this, or just try to keep him relaxed and hydrated.    Writer will message Dr. Hollis and get back to patient and wife, Chelsy.    Anamika Holland RN on 6/12/2023 at 8:56 AM    "

## 2023-06-12 NOTE — TELEPHONE ENCOUNTER
"In collaboration with Dr. Hollis, we can offer Gilbert to come in for the [facility] appointment 6\30 to discuss options to manage A Fib, or he can keep the appointment he already has with Dr. Hollis on 7\31.  Dr. Hollis has stated that patient can be in atrial fib without increased risk for several months.      \"Certainly ok to be in afib for a few months and perform DCCV. No need for EP referral at this time. Thanks.\"      Writer has attempted to contact Gilbert's wife again, but could not get the call to go through.   Will try again.    Anamika Holland, RN on 6/12/2023 at 4:46 PM    "

## 2023-06-13 NOTE — TELEPHONE ENCOUNTER
Patient along with his spouse called in to say that he is still in afib.    Spouse, who is an RN, has been checking his vitals which are as follows:        6/11: 111/81-61  And 120/    6/12: 107/74-63    6/13;  115/62-74      He feels fatigue and has some chest tightness ( no known CAD) which he said could be allergies.    I took a few moments to explain afib and its known triggers.    PLAN:    1 6/30 visit with Cristina to discuss possible DCCV.    2 No need for ED visit at this point unless he has consistent HR's in the 120-140 bpm range with symptoms.    I also gave him the on call number should questions or cocnerns arise along the way at night or over the weekend

## 2023-06-19 ENCOUNTER — TELEPHONE (OUTPATIENT)
Dept: CARDIOLOGY | Facility: CLINIC | Age: 78
End: 2023-06-19
Payer: COMMERCIAL

## 2023-06-19 NOTE — TELEPHONE ENCOUNTER
Received call from Mr. Ferrer (Gloria).  She said that Gilbert is still having a lot of fatigue related to the Atrial Fib.  He has been in constant A fib since 6\12.  They are also working on the sleep study, and there is some progress being made, next steps occur in July.    Gloria is asking appropriate questions, and provides the following data:   Since 6\12\23, BP readings have all been under 120\70.  Pulse has been 121 - 104 last week, and this week 63, 74 and 99.      They are asking if there is an option to decrease the Amlodipine that he was recently started on, to a 1\2 tablet or 2.5mg.   Writer noted they have an appointment next week with Cristina Lebron, and instructed them to bring all the data with them to that appointment.   Also, to bring list of most problematic symptoms and questions, so that focus can be directed on these central few issues during their visit time.  Routing to Dr. Hollis for review.    Anamika Holland RN on 6/19/2023 at 12:05 PM

## 2023-06-19 NOTE — TELEPHONE ENCOUNTER
Received information back from Dr. Hollis that it is OK for Gilbert to try cutting the Amlodipine dose in half.    Relayed information to Gloria and Gilbert.  They verbalize understanding and will bring the info of BP readings to clinic appmnt on 6\30.    Anamika Holland RN on 6/19/2023 at 2:47 PM

## 2023-06-30 ENCOUNTER — OFFICE VISIT (OUTPATIENT)
Dept: CARDIOLOGY | Facility: CLINIC | Age: 78
End: 2023-06-30
Payer: COMMERCIAL

## 2023-06-30 VITALS
BODY MASS INDEX: 28.99 KG/M2 | HEIGHT: 70 IN | OXYGEN SATURATION: 98 % | HEART RATE: 100 BPM | DIASTOLIC BLOOD PRESSURE: 74 MMHG | SYSTOLIC BLOOD PRESSURE: 111 MMHG | WEIGHT: 202.5 LBS

## 2023-06-30 DIAGNOSIS — I10 BENIGN ESSENTIAL HYPERTENSION: ICD-10-CM

## 2023-06-30 DIAGNOSIS — I48.91 ATRIAL FIBRILLATION WITH RVR (H): Primary | ICD-10-CM

## 2023-06-30 PROCEDURE — 93000 ELECTROCARDIOGRAM COMPLETE: CPT | Performed by: INTERNAL MEDICINE

## 2023-06-30 PROCEDURE — 99214 OFFICE O/P EST MOD 30 MIN: CPT | Performed by: INTERNAL MEDICINE

## 2023-06-30 RX ORDER — AMLODIPINE BESYLATE 2.5 MG/1
2.5 TABLET ORAL DAILY
COMMUNITY
Start: 2023-06-30 | End: 2023-09-20 | Stop reason: DRUGHIGH

## 2023-06-30 RX ORDER — METOPROLOL TARTRATE 25 MG/1
25 TABLET, FILM COATED ORAL 2 TIMES DAILY
Qty: 60 TABLET | Refills: 1 | Status: SHIPPED | OUTPATIENT
Start: 2023-06-30 | End: 2023-07-31

## 2023-06-30 NOTE — PATIENT INSTRUCTIONS
Call the nurse for any questions or concerns at [phone]     Plan:  1. Medication changes:     Start metoprolol tartrate 25 mg twice daily. This will help slow down your heart rate until we can do your cardioversion.     2. Cardioversion sometimes the week of July 10th    3. Follow up with Dr. Hollis 7/31/2023   -Scheduling phone number: [phone]    It was great seeing you today!    Cristina Lebron PA-C  Physician Assistant  [facility] [place]

## 2023-06-30 NOTE — PROGRESS NOTES
"Cardiology Clinic Progress Note  Gilbert Ferrer MRN# [id number]   YOB: 1945 Age: 77 year old   Primary Cardiologist: Dr. Hollis Reason for visit: Urgent follow-up            Assessment and Plan:   Gilbert Ferrer is a very pleasant 77 year old male who is here today for urgent follow-up given symptomatic persistent atrial fibrillation.    Mr. Ferrer was initially diagnosed with paroxysmal atrial fibrillation RVR earlier this year.  He spontaneously converted to normal sinus rhythm after diltiazem drip was initiated.  He did well until early June 2023 at which time he contact the clinic complaining of recurrent atrial fibrillation, persistent, associated with fatigue and lack of energy.     In clinic today, he continues to endorse the symptoms.  The pathophysiology of atrial fibrillation was reviewed as well as potential management options moving forward.  Additionally, we reviewed potential triggers for recurrent atrial fibrillation including obstructive sleep apnea.  It is important patient pursues evaluation and treatment of his DAVID.  After discussion regarding treatment options, patient favors sinus rhythm restoration given his symptoms associated with atrial fibrillation.  He is on Eliquis 5 mg twice daily for cardioembolic risk reduction.  However, he tells me he \"maybe\" missed a dose of Eliquis on June 11 or June 18.  Thus, I recommend elective cardioversion on or after 7/10/2023 as he will have been therapeutically anticoagulated for a minimum of 3+ weeks at that time.  In the interim, I have started him on metoprolol tartrate 25 mg twice daily for rate control given his heart rate is elevated in clinic today at 100-110 bpm.  He has a follow-up appointment scheduled with Dr. Hollis 7/31/2023 which he should keep.    The risks and benefits of direct current cardioversion were reviewed with the patient including but not limited to the inherent risks of anesthesia, skin irritation, the development of " "profound bradycardia, and stroke. Patient verbalized understanding and wishes to proceed.      Cristina Lebron PA-C  [facility]  Pager: [phone]          History of Presenting Illness:    Gilbert Ferrer is a very pleasant 77 year old male with a history of PAD with history of angioplasty of his left tibial peroneal trunk in 2016, hypertension, paroxysmal atrial fibrillation with rapid ventricular response, obstructive sleep apnea not compliant with CPAP,    Mr. Ferrer was diagnosed with atrial fibrillation RVR earlier this year after presenting to the hospital after an episode of near syncope.  He was started on a diltiazem drip and spontaneously converted back to normal sinus rhythm.  He was started on Eliquis 5 mg twice daily for cardioembolic risk reduction in the setting of an elevated [id number] score.  He saw Dr. Hollis in follow-up 3/2023 and complained of fatigue felt to be secondary to diltiazem and carvedilol.  Drug holiday was started and patient was advised to hold his diltiazem and carvedilol.  He was started on amlodipine 5 mg once daily for BP management.    He did well until early June 2023 at which time he contacted the clinic reporting recurrent atrial fibrillation, persistent.    Patient presents to clinic today for a more urgent follow-up due to symptomatic persistent atrial fibrillation.  ECG completed in clinic today reveals atrial fibrillation with  bpm.  He feels that over the past few weeks he has been operating at 80%.  He denies chest pain, shortness of breath, palpitations, lightheadedness, dizziness, near-syncope or syncope.  His only complaint is of fatigue and lack of energy.  Patient does have a history of untreated obstructive sleep apnea.  He is scheduled for drug-induced sleep endoscopy later this month.    Has only been taking amlodipine 2.5 mg once daily instead of the prescribed 5 mg once daily.  He also tells me he \"maybe\" missed a dose of Eliquis " "on June 11 or June 18.  Otherwise, he has been taking all of his medications daily as prescribed.    Heart rate elevated in clinic today 101 10 bpm, blood pressure 111/74.  No caffeine, alcohol, or tobacco use.      Social History       Social History     Socioeconomic History     Marital status:      Spouse name: Not on file     Number of children: Not on file     Years of education: Not on file     Highest education level: Not on file   Occupational History     Not on file   Tobacco Use     Smoking status: Former     Smokeless tobacco: Never     Tobacco comments:     QUIT 1967   Substance and Sexual Activity     Alcohol use: No     Alcohol/week: 0.0 standard drinks of alcohol     Comment: NONE SINCE AGE 50     Drug use: No     Sexual activity: Not on file   Other Topics Concern     Parent/sibling w/ CABG, MI or angioplasty before 65F 55M? Not Asked   Social History Narrative     Not on file     Social Determinants of Health     Financial Resource Strain: Not on file   Food Insecurity: Not on file   Transportation Needs: Not on file   Physical Activity: Not on file   Stress: Not on file   Social Connections: Not on file   Intimate Partner Violence: Not on file   Housing Stability: Not on file            Review of Systems:   Please see HPI         Physical Exam:   Vitals: /74   Pulse 100   Ht 1.778 m (5' 10\")   Wt 91.9 kg (202 lb 8 oz)   SpO2 98%   BMI 29.06 kg/m     Wt Readings from Last 4 Encounters:   03/20/23 94.1 kg (207 lb 6.4 oz)   02/06/23 90.1 kg (198 lb 9.6 oz)   07/26/17 88.7 kg (195 lb 8.8 oz)   06/28/17 88.7 kg (195 lb 8.8 oz)     GEN: well nourished, in no acute distress.  HEENT:  Pupils equal, round. Sclerae nonicteric.   NECK: Supple, no masses appreciated.  No JVD  C/V: Irregularly irregular rhythm; tachycardic.  No murmur appreciated.  RESP: Respirations are unlabored. Clear to auscultation bilaterally without wheezing, rales, or rhonchi.  GI: Abdomen soft, nontender.  EXTREM: No " LE edema.  NEURO: Alert and oriented, cooperative.  SKIN: Warm and dry.        Data:   LIPID RESULTS:  Lab Results   Component Value Date    CHOL 236 (H) 11/18/2016    HDL 44 11/18/2016     (H) 11/18/2016    TRIG 227 (H) 11/18/2016     LIVER ENZYME RESULTS:  No results found for: AST, ALT  CBC RESULTS:  Lab Results   Component Value Date    WBC 9.4 02/05/2023    WBC 7.5 11/18/2016    RBC 5.52 02/05/2023    RBC 5.02 11/18/2016    HGB 17.3 02/05/2023    HGB 15.9 11/18/2016    HCT 50.8 02/05/2023    HCT 45.7 11/18/2016    MCV 92 02/05/2023    MCV 91 11/18/2016    MCH 31.3 02/05/2023    MCH 31.7 11/18/2016    MCHC 34.1 02/05/2023    MCHC 34.8 11/18/2016    RDW 12.7 02/05/2023    RDW 12.8 11/18/2016     02/05/2023     11/18/2016     BMP RESULTS:  Lab Results   Component Value Date     02/05/2023    POTASSIUM 4.2 02/05/2023    CHLORIDE 102 02/05/2023    CO2 27 02/05/2023    ANIONGAP 11 02/05/2023     (H) 02/05/2023    BUN 15.6 02/05/2023    CR 1.15 02/05/2023    CR 1.03 11/18/2016    GFRESTIMATED 66 02/05/2023    GFRESTIMATED 71 11/18/2016    GFRESTBLACK 86 11/18/2016    BOZENA 9.6 02/05/2023      A1C RESULTS:  Lab Results   Component Value Date    A1C 5.5 11/18/2016     INR RESULTS:  Lab Results   Component Value Date    INR 0.95 11/18/2016            Medications     Current Outpatient Medications   Medication Sig Dispense Refill     amLODIPine (NORVASC) 5 MG tablet Take 1 tablet (5 mg) by mouth daily 30 tablet 3     apixaban ANTICOAGULANT (ELIQUIS) 5 MG tablet Take 1 tablet (5 mg) by mouth 2 times daily 60 tablet 11     atorvastatin (LIPITOR) 40 MG tablet Take 1 tablet (40 mg) by mouth daily 90 tablet 3     carvedilol (COREG) 12.5 MG tablet carvedilol 12.5 mg tablet   TAKE 1 TABLET (12.5 MG) BY MOUTH TWO TIMES DAILY WITH MEALS.       Cholecalciferol (VITAMIN D-3 PO) Take 2,000 Units by mouth daily       diltiazem ER COATED BEADS (CARDIZEM CD/CARTIA XT) 180 MG 24 hr capsule Take 1 capsule  (180 mg) by mouth daily (Patient not taking: Reported on 3/20/2023) 30 capsule 3     NONFORMULARY Take 1 tablet by mouth 2 times daily Super Beta            Past Medical History     Past Medical History:   Diagnosis Date     Elevated PSA      Gilbert's disease      Hyperlipidemia      Left rotator cuff tear      DAVID (obstructive sleep apnea)      PAD (peripheral artery disease) (H)      Sleep apnea     DOES NOT USE CPAP     Spermatocele     Bilateral     Stented coronary artery      Umbilical hernia      Vitamin D deficiency      Vitamin D deficiency      Past Surgical History:   Procedure Laterality Date     CARDIAC SURGERY      STENT     COLONOSCOPY       HERNIORRHAPHY VENTRAL N/A 3/18/2016    Procedure: HERNIORRHAPHY VENTRAL;  Surgeon: Nicola Rivera MD;  [facility]     PHACOEMULSIFICATION CLEAR CORNEA WITH STANDARD INTRAOCULAR LENS IMPLANT Left 6/28/2017    Procedure: PHACOEMULSIFICATION CLEAR CORNEA WITH STANDARD INTRAOCULAR LENS IMPLANT;  LEFT EYE PHACOEMULSIFICATION CLEAR CORNEA WITH STANDARD INTRAOCULAR LENS IMPLANT ;  Surgeon: Yifan Arroyo MD;  [facility]     PHACOEMULSIFICATION CLEAR CORNEA WITH STANDARD INTRAOCULAR LENS IMPLANT Right 7/26/2017    Procedure: PHACOEMULSIFICATION CLEAR CORNEA WITH STANDARD INTRAOCULAR LENS IMPLANT;  RIGHT EYE PHACOEMULSIFICATION CLEAR CORNEA WITH STANDARD INTRAOCULAR LENS IMPLANT ;  Surgeon: Yifan Arroyo MD;  Location: [facility]     No family history on file.         Allergies   Mold      30 minutes spent on the date of the encounter doing chart review, history and exam, documentation and further activities as noted above    Cristina Lebron PA-C  [facility]  Pager: [phone]

## 2023-06-30 NOTE — LETTER
"6/30/2023    Miokelsi Mayfield Mora, DO, DO  [address]  [facility] [address]    RE: Gilbert Ferrer       Dear Colleague,     I had the pleasure of seeing Gilbert Ferrer in the [facility].  Cardiology Clinic Progress Note  Gilbert Ferrer MRN# [id number]   YOB: 1945 Age: 77 year old   Primary Cardiologist: Dr. Hollis Reason for visit: Urgent follow-up            Assessment and Plan:   Gilbert Ferrer is a very pleasant 77 year old male who is here today for urgent follow-up given symptomatic persistent atrial fibrillation.    Mr. Ferrer was initially diagnosed with paroxysmal atrial fibrillation RVR earlier this year.  He spontaneously converted to normal sinus rhythm after diltiazem drip was initiated.  He did well until early June 2023 at which time he contact the clinic complaining of recurrent atrial fibrillation, persistent, associated with fatigue and lack of energy.     In clinic today, he continues to endorse the symptoms.  The pathophysiology of atrial fibrillation was reviewed as well as potential management options moving forward.  Additionally, we reviewed potential triggers for recurrent atrial fibrillation including obstructive sleep apnea.  It is important patient pursues evaluation and treatment of his DAVID.  After discussion regarding treatment options, patient favors sinus rhythm restoration given his symptoms associated with atrial fibrillation.  He is on Eliquis 5 mg twice daily for cardioembolic risk reduction.  However, he tells me he \"maybe\" missed a dose of Eliquis on June 11 or June 18.  Thus, I recommend elective cardioversion on or after 7/10/2023 as he will have been therapeutically anticoagulated for a minimum of 3+ weeks at that time.  In the interim, I have started him on metoprolol tartrate 25 mg twice daily for rate control given his heart rate is elevated in clinic today at 100-110 bpm.  He has a follow-up appointment scheduled with Dr. Hollis 7/31/2023 " which he should keep.    The risks and benefits of direct current cardioversion were reviewed with the patient including but not limited to the inherent risks of anesthesia, skin irritation, the development of profound bradycardia, and stroke. Patient verbalized understanding and wishes to proceed.      Cristina Lebron PA-C  [facility]  Pager: [phone]          History of Presenting Illness:    Gilbert Ferrer is a very pleasant 77 year old male with a history of PAD with history of angioplasty of his left tibial peroneal trunk in 2016, hypertension, paroxysmal atrial fibrillation with rapid ventricular response, obstructive sleep apnea not compliant with CPAP,    Mr. Ferrer was diagnosed with atrial fibrillation RVR earlier this year after presenting to the hospital after an episode of near syncope.  He was started on a diltiazem drip and spontaneously converted back to normal sinus rhythm.  He was started on Eliquis 5 mg twice daily for cardioembolic risk reduction in the setting of an elevated [id number] score.  He saw Dr. Hollis in follow-up 3/2023 and complained of fatigue felt to be secondary to diltiazem and carvedilol.  Drug holiday was started and patient was advised to hold his diltiazem and carvedilol.  He was started on amlodipine 5 mg once daily for BP management.    He did well until early June 2023 at which time he contacted the clinic reporting recurrent atrial fibrillation, persistent.    Patient presents to clinic today for a more urgent follow-up due to symptomatic persistent atrial fibrillation.  ECG completed in clinic today reveals atrial fibrillation with  bpm.  He feels that over the past few weeks he has been operating at 80%.  He denies chest pain, shortness of breath, palpitations, lightheadedness, dizziness, near-syncope or syncope.  His only complaint is of fatigue and lack of energy.  Patient does have a history of untreated obstructive sleep apnea.  He is  "scheduled for drug-induced sleep endoscopy later this month.    Has only been taking amlodipine 2.5 mg once daily instead of the prescribed 5 mg once daily.  He also tells me he \"maybe\" missed a dose of Eliquis on June 11 or June 18.  Otherwise, he has been taking all of his medications daily as prescribed.    Heart rate elevated in clinic today 101 10 bpm, blood pressure 111/74.  No caffeine, alcohol, or tobacco use.      Social History       Social History     Socioeconomic History    Marital status:      Spouse name: Not on file    Number of children: Not on file    Years of education: Not on file    Highest education level: Not on file   Occupational History    Not on file   Tobacco Use    Smoking status: Former    Smokeless tobacco: Never    Tobacco comments:     QUIT 1967   Substance and Sexual Activity    Alcohol use: No     Alcohol/week: 0.0 standard drinks of alcohol     Comment: NONE SINCE AGE 50    Drug use: No    Sexual activity: Not on file   Other Topics Concern    Parent/sibling w/ CABG, MI or angioplasty before 65F 55M? Not Asked   Social History Narrative    Not on file     Social Determinants of Health     Financial Resource Strain: Not on file   Food Insecurity: Not on file   Transportation Needs: Not on file   Physical Activity: Not on file   Stress: Not on file   Social Connections: Not on file   Intimate Partner Violence: Not on file   Housing Stability: Not on file            Review of Systems:   Please see HPI         Physical Exam:   Vitals: /74   Pulse 100   Ht 1.778 m (5' 10\")   Wt 91.9 kg (202 lb 8 oz)   SpO2 98%   BMI 29.06 kg/m     Wt Readings from Last 4 Encounters:   03/20/23 94.1 kg (207 lb 6.4 oz)   02/06/23 90.1 kg (198 lb 9.6 oz)   07/26/17 88.7 kg (195 lb 8.8 oz)   06/28/17 88.7 kg (195 lb 8.8 oz)     GEN: well nourished, in no acute distress.  HEENT:  Pupils equal, round. Sclerae nonicteric.   NECK: Supple, no masses appreciated.  No JVD  C/V: Irregularly " irregular rhythm; tachycardic.  No murmur appreciated.  RESP: Respirations are unlabored. Clear to auscultation bilaterally without wheezing, rales, or rhonchi.  GI: Abdomen soft, nontender.  EXTREM: No LE edema.  NEURO: Alert and oriented, cooperative.  SKIN: Warm and dry.        Data:   LIPID RESULTS:  Lab Results   Component Value Date    CHOL 236 (H) 11/18/2016    HDL 44 11/18/2016     (H) 11/18/2016    TRIG 227 (H) 11/18/2016     LIVER ENZYME RESULTS:  No results found for: AST, ALT  CBC RESULTS:  Lab Results   Component Value Date    WBC 9.4 02/05/2023    WBC 7.5 11/18/2016    RBC 5.52 02/05/2023    RBC 5.02 11/18/2016    HGB 17.3 02/05/2023    HGB 15.9 11/18/2016    HCT 50.8 02/05/2023    HCT 45.7 11/18/2016    MCV 92 02/05/2023    MCV 91 11/18/2016    MCH 31.3 02/05/2023    MCH 31.7 11/18/2016    MCHC 34.1 02/05/2023    MCHC 34.8 11/18/2016    RDW 12.7 02/05/2023    RDW 12.8 11/18/2016     02/05/2023     11/18/2016     BMP RESULTS:  Lab Results   Component Value Date     02/05/2023    POTASSIUM 4.2 02/05/2023    CHLORIDE 102 02/05/2023    CO2 27 02/05/2023    ANIONGAP 11 02/05/2023     (H) 02/05/2023    BUN 15.6 02/05/2023    CR 1.15 02/05/2023    CR 1.03 11/18/2016    GFRESTIMATED 66 02/05/2023    GFRESTIMATED 71 11/18/2016    GFRESTBLACK 86 11/18/2016    BOZENA 9.6 02/05/2023      A1C RESULTS:  Lab Results   Component Value Date    A1C 5.5 11/18/2016     INR RESULTS:  Lab Results   Component Value Date    INR 0.95 11/18/2016            Medications     Current Outpatient Medications   Medication Sig Dispense Refill    amLODIPine (NORVASC) 5 MG tablet Take 1 tablet (5 mg) by mouth daily 30 tablet 3    apixaban ANTICOAGULANT (ELIQUIS) 5 MG tablet Take 1 tablet (5 mg) by mouth 2 times daily 60 tablet 11    atorvastatin (LIPITOR) 40 MG tablet Take 1 tablet (40 mg) by mouth daily 90 tablet 3    carvedilol (COREG) 12.5 MG tablet carvedilol 12.5 mg tablet   TAKE 1 TABLET (12.5 MG) BY  MOUTH TWO TIMES DAILY WITH MEALS.      Cholecalciferol (VITAMIN D-3 PO) Take 2,000 Units by mouth daily      diltiazem ER COATED BEADS (CARDIZEM CD/CARTIA XT) 180 MG 24 hr capsule Take 1 capsule (180 mg) by mouth daily (Patient not taking: Reported on 3/20/2023) 30 capsule 3    NONFORMULARY Take 1 tablet by mouth 2 times daily Super Beta            Past Medical History     Past Medical History:   Diagnosis Date    Elevated PSA     Gilbert's disease     Hyperlipidemia     Left rotator cuff tear     DAVID (obstructive sleep apnea)     PAD (peripheral artery disease) (H)     Sleep apnea     DOES NOT USE CPAP    Spermatocele     Bilateral    Stented coronary artery     Umbilical hernia     Vitamin D deficiency     Vitamin D deficiency      Past Surgical History:   Procedure Laterality Date    CARDIAC SURGERY      STENT    COLONOSCOPY      HERNIORRHAPHY VENTRAL N/A 3/18/2016    Procedure: HERNIORRHAPHY VENTRAL;  Surgeon: Nicola Rivera MD;  [facility]    PHACOEMULSIFICATION CLEAR CORNEA WITH STANDARD INTRAOCULAR LENS IMPLANT Left 6/28/2017    Procedure: PHACOEMULSIFICATION CLEAR CORNEA WITH STANDARD INTRAOCULAR LENS IMPLANT;  LEFT EYE PHACOEMULSIFICATION CLEAR CORNEA WITH STANDARD INTRAOCULAR LENS IMPLANT ;  Surgeon: Yifan Arroyo MD;  Location:  [facility]    PHACOEMULSIFICATION CLEAR CORNEA WITH STANDARD INTRAOCULAR LENS IMPLANT Right 7/26/2017    Procedure: PHACOEMULSIFICATION CLEAR CORNEA WITH STANDARD INTRAOCULAR LENS IMPLANT;  RIGHT EYE PHACOEMULSIFICATION CLEAR CORNEA WITH STANDARD INTRAOCULAR LENS IMPLANT ;  Surgeon: Yifan Arroyo MD;  Location: [facility]     No family history on file.         Allergies   Mold      30 minutes spent on the date of the encounter doing chart review, history and exam, documentation and further activities as noted above    Cristina Lebron PA-C  [facility]  Pager: [phone]        Thank you for allowing me to participate in the care of your  patient.    Sincerely,     RANDI Schmidt [facility]  cc:   No referring provider defined for this encounter.

## 2023-07-10 ENCOUNTER — TELEPHONE (OUTPATIENT)
Dept: CARDIOLOGY | Facility: CLINIC | Age: 78
End: 2023-07-10
Payer: COMMERCIAL

## 2023-07-10 NOTE — TELEPHONE ENCOUNTER
Per HS response,    Cristina Lebron PA-C [place], Thresa K, RN  Caller: Unspecified (Today,  2:31 PM)  Try to remain on the medication until Thursday. Do not take metoprolol the morning of the procedure. We can discontinue after the cardioversion given the fatigue/decreased appetite.     Thanks,   Cristina Lebron PA-C on 7/10/2023 at 2:46 PM     Patient and wife were called to remain on Metoprolol until Thursday morning when they will stop it. Patient and wife verbalized understanding and will reassess the need for Metoprolol after cardioversion on Thursday. No new questions at this time.     Rg Graham RN

## 2023-07-10 NOTE — TELEPHONE ENCOUNTER
Patients wife states that his metoprolol tartrate 25 mg BID is making him more fatigued than ever and also has decreased appetite. He is to have a cardioversion on Thursday which is why he was put on this medication. While the patients wife states that they can remain on this medication until the procedure, she does not want this to be a long term solution. Vitals have been stable otherwise.     Per OV 6/30/23  ASSESSMENT  PAD with history of angioplasty of his left tibial peroneal trunk in 2016, hypertension, paroxysmal atrial fibrillation with rapid ventricular response, obstructive sleep apnea not compliant with CPAP.    Cardiac medications  Amlodipine 2.5 mg BID  Lipitor 40 mg daily  Eliquis 5 mg BID  Metoprolol Tartrate 25 mg BID    Please review and advise.     Rg Graham RN

## 2023-07-11 ENCOUNTER — TELEPHONE (OUTPATIENT)
Dept: CARDIOLOGY | Facility: CLINIC | Age: 78
End: 2023-07-11
Payer: COMMERCIAL

## 2023-07-11 NOTE — TELEPHONE ENCOUNTER
DCCV/FUNMI prep instructions    Patient is scheduled for a Cardioversion at [facility] - [address], [place] [address] - Main Entrance of the Hospital, on 7/13.  Check in time is at 8:30 and procedure to follow.    Patient instructed to remain NPO for solid foods 8 hours prior to arrival and may have clear liquids up to 2 hours prior to arrival.    Patient is taking Pradaxa/Xarelto/Eliquis and has been taking daily uninterrupted for at least 21days.     Patient is not diabetic.     Patient is not taking Digoxin.    Patient is taking not on diuretics. and has been advised to hold this the morning of the procedure.    Pt is not on a SGLT2 inhibitor.    Patient advised to take their other daily medications the morning of the procedure with small sips of water.     Verified patient has someone available to drive them home from the hospital and can stay with them for 24 hours after the procedure.     Patient advised to notify care team with any new COVID like symptoms prior to procedure.    Patient will check their temperature the morning of procedure and call [facility] at [phone] if temp is >100.0.    Patient is aware of visitor policy.    Patient expresses understanding of above instructions and denies further questions at this time.    Spouse will be  and care giver-Was instructed by [employer] [facility] to hold beta blocker morning of procedure  [facility]

## 2023-07-13 ENCOUNTER — ANESTHESIA (OUTPATIENT)
Dept: MEDSURG UNIT | Facility: CLINIC | Age: 78
End: 2023-07-13

## 2023-07-13 ENCOUNTER — HOSPITAL ENCOUNTER (OUTPATIENT)
Facility: CLINIC | Age: 78
Discharge: HOME | End: 2023-07-13
Admitting: INTERNAL MEDICINE
Payer: COMMERCIAL

## 2023-07-13 ENCOUNTER — ANESTHESIA (OUTPATIENT)
Dept: SURGERY | Facility: CLINIC | Age: 78
End: 2023-07-13
Payer: COMMERCIAL

## 2023-07-13 ENCOUNTER — ANESTHESIA EVENT (OUTPATIENT)
Dept: SURGERY | Facility: CLINIC | Age: 78
End: 2023-07-13
Payer: COMMERCIAL

## 2023-07-13 ENCOUNTER — ANESTHESIA EVENT (OUTPATIENT)
Dept: MEDSURG UNIT | Facility: CLINIC | Age: 78
End: 2023-07-13

## 2023-07-13 VITALS
RESPIRATION RATE: 19 BRPM | WEIGHT: 199.3 LBS | BODY MASS INDEX: 28.53 KG/M2 | TEMPERATURE: 98 F | HEIGHT: 70 IN | SYSTOLIC BLOOD PRESSURE: 122 MMHG | HEART RATE: 57 BPM | OXYGEN SATURATION: 98 % | DIASTOLIC BLOOD PRESSURE: 80 MMHG

## 2023-07-13 LAB
MAGNESIUM SERPL-MCNC: 2.1 MG/DL (ref 1.7–2.3)
POTASSIUM SERPL-SCNC: 4 MMOL/L (ref 3.4–5.3)

## 2023-07-13 PROCEDURE — 92960 CARDIOVERSION ELECTRIC EXT: CPT | Performed by: INTERNAL MEDICINE

## 2023-07-13 PROCEDURE — 999N000184 HC STATISTIC TELEMETRY

## 2023-07-13 PROCEDURE — 36415 COLL VENOUS BLD VENIPUNCTURE: CPT | Performed by: INTERNAL MEDICINE

## 2023-07-13 PROCEDURE — 92960 CARDIOVERSION ELECTRIC EXT: CPT

## 2023-07-13 PROCEDURE — 250N000011 HC RX IP 250 OP 636: Performed by: NURSE ANESTHETIST, CERTIFIED REGISTERED

## 2023-07-13 PROCEDURE — 93005 ELECTROCARDIOGRAM TRACING: CPT

## 2023-07-13 PROCEDURE — 36591 DRAW BLOOD OFF VENOUS DEVICE: CPT

## 2023-07-13 PROCEDURE — 999N000010 HC STATISTIC ANES STAT CODE-CRNA PER MINUTE

## 2023-07-13 PROCEDURE — 84132 ASSAY OF SERUM POTASSIUM: CPT | Performed by: INTERNAL MEDICINE

## 2023-07-13 PROCEDURE — 250N000013 HC RX MED GY IP 250 OP 250 PS 637: Performed by: INTERNAL MEDICINE

## 2023-07-13 PROCEDURE — 370N000017 HC ANESTHESIA TECHNICAL FEE, PER MIN

## 2023-07-13 PROCEDURE — 999N000054 HC STATISTIC EKG NON-CHARGEABLE

## 2023-07-13 PROCEDURE — 83735 ASSAY OF MAGNESIUM: CPT | Performed by: INTERNAL MEDICINE

## 2023-07-13 RX ORDER — MAGNESIUM SULFATE HEPTAHYDRATE 40 MG/ML
2 INJECTION, SOLUTION INTRAVENOUS
Status: DISCONTINUED | OUTPATIENT
Start: 2023-07-13 | End: 2023-07-13 | Stop reason: HOSPADM

## 2023-07-13 RX ORDER — SODIUM CHLORIDE 9 MG/ML
INJECTION, SOLUTION INTRAVENOUS CONTINUOUS
Status: DISCONTINUED | OUTPATIENT
Start: 2023-07-13 | End: 2023-07-13 | Stop reason: HOSPADM

## 2023-07-13 RX ORDER — POTASSIUM CHLORIDE 1500 MG/1
20 TABLET, EXTENDED RELEASE ORAL
Status: DISCONTINUED | OUTPATIENT
Start: 2023-07-13 | End: 2023-07-13 | Stop reason: HOSPADM

## 2023-07-13 RX ORDER — NALOXONE HYDROCHLORIDE 0.4 MG/ML
0.4 INJECTION, SOLUTION INTRAMUSCULAR; INTRAVENOUS; SUBCUTANEOUS
Status: DISCONTINUED | OUTPATIENT
Start: 2023-07-13 | End: 2023-07-13 | Stop reason: HOSPADM

## 2023-07-13 RX ORDER — FLUMAZENIL 0.1 MG/ML
0.2 INJECTION, SOLUTION INTRAVENOUS
Status: DISCONTINUED | OUTPATIENT
Start: 2023-07-13 | End: 2023-07-13 | Stop reason: HOSPADM

## 2023-07-13 RX ORDER — PROPOFOL 10 MG/ML
INJECTION, EMULSION INTRAVENOUS PRN
Status: DISCONTINUED | OUTPATIENT
Start: 2023-07-13 | End: 2023-07-13

## 2023-07-13 RX ORDER — POTASSIUM CHLORIDE 1500 MG/1
40 TABLET, EXTENDED RELEASE ORAL
Status: DISCONTINUED | OUTPATIENT
Start: 2023-07-13 | End: 2023-07-13 | Stop reason: HOSPADM

## 2023-07-13 RX ORDER — NALOXONE HYDROCHLORIDE 0.4 MG/ML
0.2 INJECTION, SOLUTION INTRAMUSCULAR; INTRAVENOUS; SUBCUTANEOUS
Status: DISCONTINUED | OUTPATIENT
Start: 2023-07-13 | End: 2023-07-13 | Stop reason: HOSPADM

## 2023-07-13 RX ORDER — ATROPINE SULFATE 0.1 MG/ML
.5-1 INJECTION INTRAVENOUS
Status: DISCONTINUED | OUTPATIENT
Start: 2023-07-13 | End: 2023-07-13 | Stop reason: HOSPADM

## 2023-07-13 RX ADMIN — POTASSIUM CHLORIDE 20 MEQ: 1500 TABLET, EXTENDED RELEASE ORAL at 09:35

## 2023-07-13 RX ADMIN — PROPOFOL 65 MG: 10 INJECTION, EMULSION INTRAVENOUS at 10:45

## 2023-07-13 ASSESSMENT — ACTIVITIES OF DAILY LIVING (ADL)
ADLS_ACUITY_SCORE: 35
ADLS_ACUITY_SCORE: 35

## 2023-07-13 NOTE — DISCHARGE INSTRUCTIONS
Cardioversion Discharge Instructions    After you go home:       For 24 hours - due to the sedation you received:    Have an adult stay with you for 24 hours.   Relax and take it easy.  Do NOT make any important or legal decisions.  Do NOT drive or operate machines at home or at work.  Do NOT drink alcohol.    Diet:    Start with clear liquids and progress to your normal diet as you feel able.    Medicines:    Take your medications, including blood thinners, unless your provider tells you not to.  If you have stopped any medications, check with your provider about when to restart them.    Follow Up Appointments:    Follow up with your cardiologist at [facility] of patient preference as instructed.  Follow up with your primary care provider as needed.    Post cardioversion:    The skin on your chest or back may feel tender for 48 hours.  If your skin is tender, you may:    Use a cold pack on the site. Never use ice directly on your skin. Use the cold pack for 20 minutes. Remove it for at least 30 minutes before re-using.  Apply 1% hydrocortisone cream to the skin (sold at drug stores)  Take Advil (Ibuprofen) or Tylenol (Acetaminophen) per your provider's recommendations.      Call your provider if you have:    Weakness, dizziness, lightheadedness, or fainting.  Shortness of breath.  Irregular heartbeat, feelings of your heart fluttering or beating fast, hard or palpitations.   More than minor skin discomfort or redness where the cardioversion pads were placed.  Questions or concerns.      Call 911 if you have:    Pain in your chest, arm, shoulder, neck, or upper back.  You have problems speaking or seeing.  Weakness in your arm or leg.  You are unable to move your arm or leg.  You have uncontrolled bleeding.         [facility] [place]:    [phone] [facility] (7 days a week)

## 2023-07-13 NOTE — PRE-PROCEDURE
GENERAL PRE-PROCEDURE:   Procedure:  Cardioversion  Date/Time:  7/13/2023 10:30 AM    Verbal consent obtained?: Yes    Written consent obtained?: Yes    Risks and benefits: Risks, benefits and alternatives were discussed    Consent given by:  Patient  Patient states understanding of procedure being performed: Yes    Patient's understanding of procedure matches consent: Yes    Procedure consent matches procedure scheduled: Yes    Expected level of sedation:  Moderate  Appropriately NPO:  Yes  ASA Class:  2  Mallampati  :  Grade 2- soft palate, base of uvula, tonsillar pillars, and portion of posterior pharyngeal wall visible  Lungs:  Lungs clear with good breath sounds bilaterally  Heart:  A-fib  History & Physical reviewed:  History and physical reviewed and no updates needed  Statement of review:  I have reviewed the lab findings, diagnostic data, medications, and the plan for sedation

## 2023-07-13 NOTE — ANESTHESIA PREPROCEDURE EVALUATION
Anesthesia Pre-Procedure Evaluation    Patient: Gilbert Ferrer   MRN: [id number] : 1945        Procedure : Procedure(s):  cardioversion          Past Medical History:   Diagnosis Date     Elevated PSA      Gilbert's disease      Hyperlipidemia      Left rotator cuff tear      DAVID (obstructive sleep apnea)      PAD (peripheral artery disease) (H)      Sleep apnea     DOES NOT USE CPAP     Spermatocele     Bilateral     Stented coronary artery      Umbilical hernia      Vitamin D deficiency      Vitamin D deficiency       Past Surgical History:   Procedure Laterality Date     ANESTHESIA CARDIOVERSION N/A 2023    Procedure: cardioversion;  Surgeon: GENERIC ANESTHESIA PROVIDER;  Location:  OR     CARDIAC SURGERY      STENT     COLONOSCOPY       HERNIORRHAPHY VENTRAL N/A 3/18/2016    Procedure: HERNIORRHAPHY VENTRAL;  Surgeon: Nicola Rivera MD;  Location:  [facility]     PHACOEMULSIFICATION CLEAR CORNEA WITH STANDARD INTRAOCULAR LENS IMPLANT Left 2017    Procedure: PHACOEMULSIFICATION CLEAR CORNEA WITH STANDARD INTRAOCULAR LENS IMPLANT;  LEFT EYE PHACOEMULSIFICATION CLEAR CORNEA WITH STANDARD INTRAOCULAR LENS IMPLANT ;  Surgeon: Yifan Arroyo MD;  Location:  [facility]     PHACOEMULSIFICATION CLEAR CORNEA WITH STANDARD INTRAOCULAR LENS IMPLANT Right 2017    Procedure: PHACOEMULSIFICATION CLEAR CORNEA WITH STANDARD INTRAOCULAR LENS IMPLANT;  RIGHT EYE PHACOEMULSIFICATION CLEAR CORNEA WITH STANDARD INTRAOCULAR LENS IMPLANT ;  Surgeon: Yifan Arroyo MD;  Location:  [facility]      Allergies   Allergen Reactions     Mold       Social History     Tobacco Use     Smoking status: Former     Smokeless tobacco: Never     Tobacco comments:     QUIT    Substance Use Topics     Alcohol use: No     Alcohol/week: 0.0 standard drinks of alcohol     Comment: NONE SINCE AGE 50      Wt Readings from Last 1 Encounters:   23 90.4 kg (199 lb 4.8 oz)        Anesthesia Evaluation            ROS/MED  HX  ENT/Pulmonary:     (+) sleep apnea,     Neurologic:       Cardiovascular:     (+) --CAD --stent-    METS/Exercise Tolerance:     Hematologic:       Musculoskeletal:       GI/Hepatic: Comment: Elvie arana      Renal/Genitourinary:       Endo:       Psychiatric/Substance Use:       Infectious Disease:       Malignancy:       Other:            Physical Exam    Airway        Mallampati: II   TM distance: > 3 FB   Neck ROM: full   Mouth opening: > 3 cm    Respiratory Devices and Support         Dental       (+) Minor Abnormalities - some fillings, tiny chips      Cardiovascular          Rhythm and rate: irregular     Pulmonary   pulmonary exam normal                OUTSIDE LABS:  CBC:   Lab Results   Component Value Date    WBC 9.4 02/05/2023    WBC 7.5 11/18/2016    HGB 17.3 02/05/2023    HGB 15.9 11/18/2016    HCT 50.8 02/05/2023    HCT 45.7 11/18/2016     02/05/2023     11/18/2016     BMP:   Lab Results   Component Value Date     02/05/2023    POTASSIUM 4.0 07/13/2023    POTASSIUM 4.2 02/05/2023    CHLORIDE 102 02/05/2023    CO2 27 02/05/2023    BUN 15.6 02/05/2023    CR 1.15 02/05/2023    CR 1.03 11/18/2016     (H) 02/05/2023     COAGS:   Lab Results   Component Value Date    PTT 32 11/18/2016    INR 0.95 11/18/2016     POC: No results found for: BGM, HCG, HCGS  HEPATIC: No results found for: ALBUMIN, PROTTOTAL, ALT, AST, GGT, ALKPHOS, BILITOTAL, BILIDIRECT, DEVON  OTHER:   Lab Results   Component Value Date    A1C 5.5 11/18/2016    BOZENA 9.6 02/05/2023    MAG 2.1 07/13/2023    TSH 2.37 02/05/2023       Anesthesia Plan    ASA Status:  3   NPO Status:  NPO Appropriate    Anesthesia Type: General.     - Airway: Native airway              Consents    Anesthesia Plan(s) and associated risks, benefits, and realistic alternatives discussed. Questions answered and patient/representative(s) expressed understanding.    - Discussed:     - Discussed with:  Patient         Postoperative Care             Comments:                Reginald Zhang, DO, DO

## 2023-07-13 NOTE — PROGRESS NOTES
Care Suites Admission Nursing Note    Patient Information  Name: Gilbert Ferrer  Age: 77 year old  Reason for admission: cardioversion  Care Suites arrival time: 0825    Visitor Information  Name: Gloria     Patient Admission/Assessment   Pre-procedure assessment complete: Yes  If abnormal assessment/labs, provider notified: N/A   Per orders, patient given potassium replacement for level of 4.0.   NPO: Yes  Medications held per instructions/orders: Yes  Consent: deferred  If applicable, pregnancy test status: deferred  Patient oriented to room: Yes  Education/questions answered: Yes  Plan/other: Proceed as scheduled    Discharge Planning  Discharge name/phone number: Gloria [phone]  Overnight post sedation caregiver: Gloria  Discharge location: home    Maggy Wyatt RN

## 2023-07-13 NOTE — ANESTHESIA POSTPROCEDURE EVALUATION
Patient: Gilbert Ferrer    Procedure: Procedure(s):  cardioversion       Anesthesia Type:  General    Note:     Postop Pain Control: Uneventful            Sign Out: Well controlled pain   PONV: No   Neuro/Psych: Uneventful            Sign Out: Acceptable/Baseline neuro status   Airway/Respiratory: Uneventful            Sign Out: Acceptable/Baseline resp. status   CV/Hemodynamics: Uneventful            Sign Out: Acceptable CV status; No obvious hypovolemia; No obvious fluid overload   Other NRE: NONE   DID A NON-ROUTINE EVENT OCCUR?            Last vitals:  Vitals:    07/13/23 1115 07/13/23 1115 07/13/23 1120   BP:   122/80   Pulse: 59  57   Resp:  15 19   Temp:      SpO2:   98%       Electronically Signed By: Reginald Zhang DO, DO  July 13, 2023  3:22 PM

## 2023-07-13 NOTE — PROCEDURES
[facility]    Procedure: *Cardioversion    Date/Time: 7/13/2023 11:48 AM    Performed by: Chetan Peng MD  Authorized by: Chetan Peng MD      UNIVERSAL PROTOCOL   Site Marked: Yes  Prior Images Obtained and Reviewed:  Yes  Required items: Required blood products, implants, devices and special equipment available    Patient identity confirmed:  Verbally with patient  Patient was reevaluated immediately before administering moderate or deep sedation or anesthesia  Confirmation Checklist:  Patient's identity using two indicators  Time out: Immediately prior to the procedure a time out was called    Universal Protocol: the Joint Commission Universal Protocol was followed    Preparation: Patient was prepped and draped in usual sterile fashion       ANESTHESIA  Anesthesia was administered and monitored by anesthesiology.  See anesthesia documentation for details.    SEDATION  Patient Sedated: Yes    Vital signs: Vital signs monitored during sedation      PROCEDURE DETAILS  Cardioversion basis: elective  Indications: failure of anti-arrhythmic medications  Pre-procedure rhythm: atrial fibrillation  Patient position: patient was placed in a supine position  Chest area: chest area exposed  Electrodes: pads  Electrodes placed: anterior-posterior  Number of attempts: 1    Details of Attempts:  DC Cardioversion Procedure Note:    Informed consent obtained.  Pads placed in AP position.  Anesthesia used, please see their documentation.    Synchronized, biphasic 120J shock x 1 delivered and successful in converting atrial fibrillation to sinus bradycardia HR 55 bpm (patient had not taken metoprolol and I asked him not to take it on discharge).    No apparent complications.    Post-procedure rhythm: normal sinus rhythm      PROCEDURE    Patient Tolerance:  Patient tolerated the procedure well with no immediate complications

## 2023-07-13 NOTE — ANESTHESIA CARE TRANSFER NOTE
Patient: Gilbert Ferrer    Procedure: Procedure(s):  cardioversion       Diagnosis: A-fib (H) [I48.91]  Diagnosis Additional Information: No value filed.    Anesthesia Type:   No value filed.     Note:    Oropharynx: oropharynx clear of all foreign objects  Level of Consciousness: awake  Oxygen Supplementation: room air    Independent Airway: airway patency satisfactory and stable  Dentition: dentition unchanged  Vital Signs Stable: post-procedure vital signs reviewed and stable  Report to RN Given: handoff report given  Patient transferred to: Patient Discharged    Handoff Report: Identifed the Patient, Identified the Reponsible Provider, Reviewed the pertinent medical history, Discussed the surgical course, Reviewed Intra-OP anesthesia mangement and issues during anesthesia, Set expectations for post-procedure period and Allowed opportunity for questions and acknowledgement of understanding      Vitals:  Vitals Value Taken Time   BP     Temp     Pulse     Resp     SpO2         Electronically Signed By: AYE Chase CRNA  July 13, 2023  11:45 AM

## 2023-07-13 NOTE — ANESTHESIA PREPROCEDURE EVALUATION
Anesthesia Pre-Procedure Evaluation    Patient: Gilbert Ferrer   MRN: [id number] : 1945        Procedure :   Cardioversion External       Past Medical History:   Diagnosis Date     Elevated PSA      Gilbert's disease      Hyperlipidemia      Left rotator cuff tear      DAVID (obstructive sleep apnea)      PAD (peripheral artery disease) (H)      Sleep apnea     DOES NOT USE CPAP     Spermatocele     Bilateral     Stented coronary artery      Umbilical hernia      Vitamin D deficiency      Vitamin D deficiency       Past Surgical History:   Procedure Laterality Date     CARDIAC SURGERY      STENT     COLONOSCOPY       HERNIORRHAPHY VENTRAL N/A 3/18/2016    Procedure: HERNIORRHAPHY VENTRAL;  Surgeon: Nicola Rivera MD;  Location:  [facility]     PHACOEMULSIFICATION CLEAR CORNEA WITH STANDARD INTRAOCULAR LENS IMPLANT Left 2017    Procedure: PHACOEMULSIFICATION CLEAR CORNEA WITH STANDARD INTRAOCULAR LENS IMPLANT;  LEFT EYE PHACOEMULSIFICATION CLEAR CORNEA WITH STANDARD INTRAOCULAR LENS IMPLANT ;  Surgeon: Yifan Arroyo MD;  Location:  [facility]     PHACOEMULSIFICATION CLEAR CORNEA WITH STANDARD INTRAOCULAR LENS IMPLANT Right 2017    Procedure: PHACOEMULSIFICATION CLEAR CORNEA WITH STANDARD INTRAOCULAR LENS IMPLANT;  RIGHT EYE PHACOEMULSIFICATION CLEAR CORNEA WITH STANDARD INTRAOCULAR LENS IMPLANT ;  Surgeon: Yifan Arroyo MD;  Location:  [facility]      Allergies   Allergen Reactions     Mold       Social History     Tobacco Use     Smoking status: Former     Smokeless tobacco: Never     Tobacco comments:     QUIT    Substance Use Topics     Alcohol use: No     Alcohol/week: 0.0 standard drinks of alcohol     Comment: NONE SINCE AGE 50      Wt Readings from Last 1 Encounters:   23 90.4 kg (199 lb 4.8 oz)        Anesthesia Evaluation            ROS/MED HX  ENT/Pulmonary:     (+) sleep apnea,     Neurologic:       Cardiovascular:     (+) --CAD --stent-    METS/Exercise Tolerance:      Hematologic:       Musculoskeletal:       GI/Hepatic: Comment: Elvie arana      Renal/Genitourinary:       Endo:       Psychiatric/Substance Use:       Infectious Disease:       Malignancy:       Other:               OUTSIDE LABS:  CBC:   Lab Results   Component Value Date    WBC 9.4 02/05/2023    WBC 7.5 11/18/2016    HGB 17.3 02/05/2023    HGB 15.9 11/18/2016    HCT 50.8 02/05/2023    HCT 45.7 11/18/2016     02/05/2023     11/18/2016     BMP:   Lab Results   Component Value Date     02/05/2023    POTASSIUM 4.0 07/13/2023    POTASSIUM 4.2 02/05/2023    CHLORIDE 102 02/05/2023    CO2 27 02/05/2023    BUN 15.6 02/05/2023    CR 1.15 02/05/2023    CR 1.03 11/18/2016     (H) 02/05/2023     COAGS:   Lab Results   Component Value Date    PTT 32 11/18/2016    INR 0.95 11/18/2016     POC: No results found for: BGM, HCG, HCGS  HEPATIC: No results found for: ALBUMIN, PROTTOTAL, ALT, AST, GGT, ALKPHOS, BILITOTAL, BILIDIRECT, DEVON  OTHER:   Lab Results   Component Value Date    A1C 5.5 11/18/2016    BOZENA 9.6 02/05/2023    MAG 2.1 07/13/2023    TSH 2.37 02/05/2023               Reginald Zhang DO, DO

## 2023-07-17 LAB
ATRIAL RATE - MUSE: 56 BPM
ATRIAL RATE - MUSE: 80 BPM
DIASTOLIC BLOOD PRESSURE - MUSE: NORMAL MMHG
DIASTOLIC BLOOD PRESSURE - MUSE: NORMAL MMHG
INTERPRETATION ECG - MUSE: NORMAL
INTERPRETATION ECG - MUSE: NORMAL
P AXIS - MUSE: 44 DEGREES
P AXIS - MUSE: NORMAL DEGREES
PR INTERVAL - MUSE: 200 MS
PR INTERVAL - MUSE: NORMAL MS
QRS DURATION - MUSE: 76 MS
QRS DURATION - MUSE: 84 MS
QT - MUSE: 368 MS
QT - MUSE: 454 MS
QTC - MUSE: 438 MS
QTC - MUSE: 450 MS
R AXIS - MUSE: 39 DEGREES
R AXIS - MUSE: 59 DEGREES
SYSTOLIC BLOOD PRESSURE - MUSE: NORMAL MMHG
SYSTOLIC BLOOD PRESSURE - MUSE: NORMAL MMHG
T AXIS - MUSE: 65 DEGREES
T AXIS - MUSE: 73 DEGREES
VENTRICULAR RATE- MUSE: 56 BPM
VENTRICULAR RATE- MUSE: 90 BPM

## 2023-07-29 DIAGNOSIS — I48.91 ATRIAL FIBRILLATION WITH RVR (H): ICD-10-CM

## 2023-07-31 ENCOUNTER — OFFICE VISIT (OUTPATIENT)
Dept: CARDIOLOGY | Facility: CLINIC | Age: 78
End: 2023-07-31
Attending: INTERNAL MEDICINE
Payer: COMMERCIAL

## 2023-07-31 VITALS
SYSTOLIC BLOOD PRESSURE: 111 MMHG | WEIGHT: 201.5 LBS | BODY MASS INDEX: 28.85 KG/M2 | HEART RATE: 75 BPM | DIASTOLIC BLOOD PRESSURE: 77 MMHG | HEIGHT: 70 IN

## 2023-07-31 DIAGNOSIS — I48.91 ATRIAL FIBRILLATION WITH RVR (H): ICD-10-CM

## 2023-07-31 PROCEDURE — 99214 OFFICE O/P EST MOD 30 MIN: CPT | Performed by: INTERNAL MEDICINE

## 2023-07-31 PROCEDURE — 93000 ELECTROCARDIOGRAM COMPLETE: CPT | Performed by: INTERNAL MEDICINE

## 2023-07-31 NOTE — PROGRESS NOTES
HPI and Plan:   Very pleasant 77-year-old gentleman, accompanied by his wife who is here for follow-up of persistent atrial fibrillation.    With a [id number] score of 3, as he is hypertensive and 77 years old, he he understands he will be on long-term oral anticoagulation with Eliquis.    I am happy to see that normal sinus rhythm is maintained.  He continues to feel fatigued.  He falls asleep easily and I think the symptoms are attributable to untreated sleep apnea.  He is scheduled to have an inspire device placed and hopefully this will result in some improvement of his fatigue.  He denies other systemic symptoms and recent labs including TSH are unremarkable.    He has no cardiac symptoms.    His cardiac examination is unremarkable.    Blood pressure is excellent.  His spouse does say that occasionally has readings around 1 40-1 50 and I reassured her that this is nothing to be concerned about as long as average readings are around 130/80.    He does have a mildly enlarged aorta which we will follow by echocardiography.  I think it is highly unlikely that we would require surgical intervention for it.    He is scheduled to have an endoscopy in late August and I reassured him that holding Eliquis prior to this procedure should be fine.    Plan to see him again in 6 months time for continued follow-up.    Orders Placed This Encounter   Procedures    Follow-Up with Cardiology    EKG 12-lead complete w/read - Clinics (performed today)    Echocardiogram Complete       No orders of the defined types were placed in this encounter.      Encounter Diagnosis   Name Primary?    Atrial fibrillation with RVR (H)        CURRENT MEDICATIONS:  Current Outpatient Medications   Medication Sig Dispense Refill    amLODIPine (NORVASC) 2.5 MG tablet Take 1 tablet (2.5 mg) by mouth daily Taking 1/2 tablet daily      apixaban ANTICOAGULANT (ELIQUIS) 5 MG tablet Take 1 tablet (5 mg) by mouth 2 times daily 60 tablet 11    atorvastatin  (LIPITOR) 40 MG tablet Take 1 tablet (40 mg) by mouth daily 90 tablet 3    Cholecalciferol (VITAMIN D-3 PO) Take 2,000 Units by mouth daily      NONFORMULARY Take 1 tablet by mouth 2 times daily Super Beta         ALLERGIES     Allergies   Allergen Reactions    Mold        PAST MEDICAL HISTORY:  Past Medical History:   Diagnosis Date    Elevated PSA     Gilbert's disease     Hyperlipidemia     Left rotator cuff tear     DAVID (obstructive sleep apnea)     PAD (peripheral artery disease) (H)     Sleep apnea     DOES NOT USE CPAP    Spermatocele     Bilateral    Stented coronary artery     Umbilical hernia     Vitamin D deficiency     Vitamin D deficiency        PAST SURGICAL HISTORY:  Past Surgical History:   Procedure Laterality Date    ANESTHESIA CARDIOVERSION N/A 7/13/2023    Procedure: cardioversion;  Surgeon: GENERIC ANESTHESIA PROVIDER;  Location:  OR    CARDIAC SURGERY      STENT    COLONOSCOPY      HERNIORRHAPHY VENTRAL N/A 3/18/2016    Procedure: HERNIORRHAPHY VENTRAL;  Surgeon: Nicola Rivera MD;  Location:  [facility]    PHACOEMULSIFICATION CLEAR CORNEA WITH STANDARD INTRAOCULAR LENS IMPLANT Left 6/28/2017    Procedure: PHACOEMULSIFICATION CLEAR CORNEA WITH STANDARD INTRAOCULAR LENS IMPLANT;  LEFT EYE PHACOEMULSIFICATION CLEAR CORNEA WITH STANDARD INTRAOCULAR LENS IMPLANT ;  Surgeon: Yifan Arroyo MD;  Location:  [facility]    PHACOEMULSIFICATION CLEAR CORNEA WITH STANDARD INTRAOCULAR LENS IMPLANT Right 7/26/2017    Procedure: PHACOEMULSIFICATION CLEAR CORNEA WITH STANDARD INTRAOCULAR LENS IMPLANT;  RIGHT EYE PHACOEMULSIFICATION CLEAR CORNEA WITH STANDARD INTRAOCULAR LENS IMPLANT ;  Surgeon: Yifan Arroyo MD;  Location: [facility]       FAMILY HISTORY:  History reviewed. No pertinent family history.    SOCIAL HISTORY:  Social History     Socioeconomic History    Marital status:      Spouse name: None    Number of children: None    Years of education: None    Highest education level: None  "  Tobacco Use    Smoking status: Former    Smokeless tobacco: Never    Tobacco comments:     QUIT 1967   Substance and Sexual Activity    Alcohol use: No     Alcohol/week: 0.0 standard drinks of alcohol     Comment: NONE SINCE AGE 50    Drug use: No       Review of Systems:  Skin:  Negative     Eyes:    glasses  ENT:  Negative    Respiratory:  Positive for sleep apnea  Cardiovascular:  Negative    Gastroenterology: Negative    Genitourinary:  Negative    Musculoskeletal:  Negative    Neurologic:  Negative    Psychiatric:  Negative    Heme/Lymph/Imm:  Negative    Endocrine:  Negative      Physical Exam:  Vitals: /77   Pulse 75   Ht 1.778 m (5' 10\")   Wt 91.4 kg (201 lb 8 oz)   BMI 28.91 kg/m      Constitutional:  cooperative, alert and oriented, well developed, well nourished, in no acute distress        Skin:  warm and dry to the touch, no apparent skin lesions or masses noted          Head:  normocephalic, no masses or lesions        Eyes:  conjunctivae and lids unremarkable        Lymph:No Cervical lymphadenopathy present     ENT:  no pallor or cyanosis, dentition good        Neck:  carotid pulses are full and equal bilaterally, JVP normal, no carotid bruit        Respiratory:  normal breath sounds, clear to auscultation, normal A-P diameter, normal symmetry, normal respiratory excursion, no use of accessory muscles         Cardiac: regular rhythm, normal S1/S2, no S3 or S4, apical impulse not displaced, no murmurs, gallops or rubs                pulses full and equal, no bruits auscultated pulses below the femoral arteries are diminished                                      GI:  abdomen soft, non-tender, BS normoactive, no mass, no HSM, no bruits        Extremities and Muscular Skeletal:  no deformities, clubbing, cyanosis, erythema observed              Neurological:  no gross motor deficits        Psych:  Alert and Oriented x 3        Recent Lab Results:  LIPID RESULTS:  Lab Results   Component Value " Date    CHOL 236 (H) 11/18/2016    HDL 44 11/18/2016     (H) 11/18/2016    TRIG 227 (H) 11/18/2016       LIVER ENZYME RESULTS:  No results found for: AST, ALT    CBC RESULTS:  Lab Results   Component Value Date    WBC 9.4 02/05/2023    WBC 7.5 11/18/2016    RBC 5.52 02/05/2023    RBC 5.02 11/18/2016    HGB 17.3 02/05/2023    HGB 15.9 11/18/2016    HCT 50.8 02/05/2023    HCT 45.7 11/18/2016    MCV 92 02/05/2023    MCV 91 11/18/2016    MCH 31.3 02/05/2023    MCH 31.7 11/18/2016    MCHC 34.1 02/05/2023    MCHC 34.8 11/18/2016    RDW 12.7 02/05/2023    RDW 12.8 11/18/2016     02/05/2023     11/18/2016       BMP RESULTS:  Lab Results   Component Value Date     02/05/2023    POTASSIUM 4.0 07/13/2023    CHLORIDE 102 02/05/2023    CO2 27 02/05/2023    ANIONGAP 11 02/05/2023     (H) 02/05/2023    BUN 15.6 02/05/2023    CR 1.15 02/05/2023    CR 1.03 11/18/2016    GFRESTIMATED 66 02/05/2023    GFRESTIMATED 71 11/18/2016    GFRESTBLACK 86 11/18/2016    BOZENA 9.6 02/05/2023        A1C RESULTS:  Lab Results   Component Value Date    A1C 5.5 11/18/2016       INR RESULTS:  Lab Results   Component Value Date    INR 0.95 11/18/2016           CC  Gee Hollis MD  [id number]  [place] [id number]

## 2023-07-31 NOTE — LETTER
7/31/2023    Mio Mayfield Mora, DO, DO  [address]  [place] [address]    RE: Gilbert Ferrer       Dear Colleague,     I had the pleasure of seeing Gilbert Marcialueger in the [facility].  HPI and Plan:   Very pleasant 77-year-old gentleman, accompanied by his wife who is here for follow-up of persistent atrial fibrillation.    With a [id number] score of 3, as he is hypertensive and 77 years old, he he understands he will be on long-term oral anticoagulation with Eliquis.    I am happy to see that normal sinus rhythm is maintained.  He continues to feel fatigued.  He falls asleep easily and I think the symptoms are attributable to untreated sleep apnea.  He is scheduled to have an inspire device placed and hopefully this will result in some improvement of his fatigue.  He denies other systemic symptoms and recent labs including TSH are unremarkable.    He has no cardiac symptoms.    His cardiac examination is unremarkable.    Blood pressure is excellent.  His spouse does say that occasionally has readings around 1 40-1 50 and I reassured her that this is nothing to be concerned about as long as average readings are around 130/80.    He does have a mildly enlarged aorta which we will follow by echocardiography.  I think it is highly unlikely that we would require surgical intervention for it.    He is scheduled to have an endoscopy in late August and I reassured him that holding Eliquis prior to this procedure should be fine.    Plan to see him again in 6 months time for continued follow-up.    Orders Placed This Encounter   Procedures    Follow-Up with Cardiology    EKG 12-lead complete w/read - Clinics (performed today)    Echocardiogram Complete       No orders of the defined types were placed in this encounter.      Encounter Diagnosis   Name Primary?    Atrial fibrillation with RVR (H)        CURRENT MEDICATIONS:  Current Outpatient Medications   Medication Sig Dispense Refill     amLODIPine (NORVASC) 2.5 MG tablet Take 1 tablet (2.5 mg) by mouth daily Taking 1/2 tablet daily      apixaban ANTICOAGULANT (ELIQUIS) 5 MG tablet Take 1 tablet (5 mg) by mouth 2 times daily 60 tablet 11    atorvastatin (LIPITOR) 40 MG tablet Take 1 tablet (40 mg) by mouth daily 90 tablet 3    Cholecalciferol (VITAMIN D-3 PO) Take 2,000 Units by mouth daily      NONFORMULARY Take 1 tablet by mouth 2 times daily Super Beta         ALLERGIES     Allergies   Allergen Reactions    Mold        PAST MEDICAL HISTORY:  Past Medical History:   Diagnosis Date    Elevated PSA     Gilbert's disease     Hyperlipidemia     Left rotator cuff tear     DAVID (obstructive sleep apnea)     PAD (peripheral artery disease) (H)     Sleep apnea     DOES NOT USE CPAP    Spermatocele     Bilateral    Stented coronary artery     Umbilical hernia     Vitamin D deficiency     Vitamin D deficiency        PAST SURGICAL HISTORY:  Past Surgical History:   Procedure Laterality Date    ANESTHESIA CARDIOVERSION N/A 7/13/2023    Procedure: cardioversion;  Surgeon: GENERIC ANESTHESIA PROVIDER;  Location:  OR    CARDIAC SURGERY      STENT    COLONOSCOPY      HERNIORRHAPHY VENTRAL N/A 3/18/2016    Procedure: HERNIORRHAPHY VENTRAL;  Surgeon: Nicola Rivera MD;  Location:  [facility]    PHACOEMULSIFICATION CLEAR CORNEA WITH STANDARD INTRAOCULAR LENS IMPLANT Left 6/28/2017    Procedure: PHACOEMULSIFICATION CLEAR CORNEA WITH STANDARD INTRAOCULAR LENS IMPLANT;  LEFT EYE PHACOEMULSIFICATION CLEAR CORNEA WITH STANDARD INTRAOCULAR LENS IMPLANT ;  Surgeon: Yifan Arroyo MD;  Location:  [facility]    PHACOEMULSIFICATION CLEAR CORNEA WITH STANDARD INTRAOCULAR LENS IMPLANT Right 7/26/2017    Procedure: PHACOEMULSIFICATION CLEAR CORNEA WITH STANDARD INTRAOCULAR LENS IMPLANT;  RIGHT EYE PHACOEMULSIFICATION CLEAR CORNEA WITH STANDARD INTRAOCULAR LENS IMPLANT ;  Surgeon: Yifan Arroyo MD;  Location: [facility]       FAMILY HISTORY:  History reviewed. No  "pertinent family history.    SOCIAL HISTORY:  Social History     Socioeconomic History    Marital status:      Spouse name: None    Number of children: None    Years of education: None    Highest education level: None   Tobacco Use    Smoking status: Former    Smokeless tobacco: Never    Tobacco comments:     QUIT 1967   Substance and Sexual Activity    Alcohol use: No     Alcohol/week: 0.0 standard drinks of alcohol     Comment: NONE SINCE AGE 50    Drug use: No       Review of Systems:  Skin:  Negative     Eyes:    glasses  ENT:  Negative    Respiratory:  Positive for sleep apnea  Cardiovascular:  Negative    Gastroenterology: Negative    Genitourinary:  Negative    Musculoskeletal:  Negative    Neurologic:  Negative    Psychiatric:  Negative    Heme/Lymph/Imm:  Negative    Endocrine:  Negative      Physical Exam:  Vitals: /77   Pulse 75   Ht 1.778 m (5' 10\")   Wt 91.4 kg (201 lb 8 oz)   BMI 28.91 kg/m      Constitutional:  cooperative, alert and oriented, well developed, well nourished, in no acute distress        Skin:  warm and dry to the touch, no apparent skin lesions or masses noted          Head:  normocephalic, no masses or lesions        Eyes:  conjunctivae and lids unremarkable        Lymph:No Cervical lymphadenopathy present     ENT:  no pallor or cyanosis, dentition good        Neck:  carotid pulses are full and equal bilaterally, JVP normal, no carotid bruit        Respiratory:  normal breath sounds, clear to auscultation, normal A-P diameter, normal symmetry, normal respiratory excursion, no use of accessory muscles         Cardiac: regular rhythm, normal S1/S2, no S3 or S4, apical impulse not displaced, no murmurs, gallops or rubs                pulses full and equal, no bruits auscultated pulses below the femoral arteries are diminished                                      GI:  abdomen soft, non-tender, BS normoactive, no mass, no HSM, no bruits        Extremities and Muscular " Skeletal:  no deformities, clubbing, cyanosis, erythema observed              Neurological:  no gross motor deficits        Psych:  Alert and Oriented x 3        Recent Lab Results:  LIPID RESULTS:  Lab Results   Component Value Date    CHOL 236 (H) 11/18/2016    HDL 44 11/18/2016     (H) 11/18/2016    TRIG 227 (H) 11/18/2016       LIVER ENZYME RESULTS:  No results found for: AST, ALT    CBC RESULTS:  Lab Results   Component Value Date    WBC 9.4 02/05/2023    WBC 7.5 11/18/2016    RBC 5.52 02/05/2023    RBC 5.02 11/18/2016    HGB 17.3 02/05/2023    HGB 15.9 11/18/2016    HCT 50.8 02/05/2023    HCT 45.7 11/18/2016    MCV 92 02/05/2023    MCV 91 11/18/2016    MCH 31.3 02/05/2023    MCH 31.7 11/18/2016    MCHC 34.1 02/05/2023    MCHC 34.8 11/18/2016    RDW 12.7 02/05/2023    RDW 12.8 11/18/2016     02/05/2023     11/18/2016       BMP RESULTS:  Lab Results   Component Value Date     02/05/2023    POTASSIUM 4.0 07/13/2023    CHLORIDE 102 02/05/2023    CO2 27 02/05/2023    ANIONGAP 11 02/05/2023     (H) 02/05/2023    BUN 15.6 02/05/2023    CR 1.15 02/05/2023    CR 1.03 11/18/2016    GFRESTIMATED 66 02/05/2023    GFRESTIMATED 71 11/18/2016    GFRESTBLACK 86 11/18/2016    BOZENA 9.6 02/05/2023        A1C RESULTS:  Lab Results   Component Value Date    A1C 5.5 11/18/2016       INR RESULTS:  Lab Results   Component Value Date    INR 0.95 11/18/2016           CC  Allison Hollis MD  [address]  [place] [id number]    Thank you for allowing me to participate in the care of your patient.      Sincerely,     DR ALLISON HOLLIS MD     [facility]

## 2023-08-16 ENCOUNTER — LAB REQUISITION (OUTPATIENT)
Dept: LAB | Facility: CLINIC | Age: 78
End: 2023-08-16

## 2023-08-16 PROCEDURE — 88342 IMHCHEM/IMCYTCHM 1ST ANTB: CPT | Mod: TC | Performed by: DENTIST

## 2023-09-20 ENCOUNTER — TELEPHONE (OUTPATIENT)
Dept: CARDIOLOGY | Facility: CLINIC | Age: 78
End: 2023-09-20
Payer: COMMERCIAL

## 2023-09-20 DIAGNOSIS — I10 BENIGN ESSENTIAL HYPERTENSION: ICD-10-CM

## 2023-09-20 DIAGNOSIS — I48.91 ATRIAL FIBRILLATION WITH RVR (H): Primary | ICD-10-CM

## 2023-09-20 RX ORDER — AMLODIPINE BESYLATE 2.5 MG/1
2.5 TABLET ORAL DAILY
Qty: 90 TABLET | Refills: 3 | Status: SHIPPED | OUTPATIENT
Start: 2023-09-20 | End: 2023-09-22

## 2023-09-20 NOTE — TELEPHONE ENCOUNTER
Writer received call from Gilbert's wife, Gloria.  She states that Gilbert is now using 2.5mg of Amlodipine, and they would appreciate a new prescription so they don't have to cut the tablets in half.    Writer called her back to let them know that we will send in new Rx, to [facility] in [place].    They thanked us for helping.    Anamika Holland RN on 9/20/2023 at 8:30 AM

## 2023-09-22 DIAGNOSIS — I48.91 ATRIAL FIBRILLATION WITH RVR (H): ICD-10-CM

## 2023-09-22 DIAGNOSIS — I10 BENIGN ESSENTIAL HYPERTENSION: ICD-10-CM

## 2023-09-22 RX ORDER — AMLODIPINE BESYLATE 2.5 MG/1
2.5 TABLET ORAL DAILY
Qty: 90 TABLET | Refills: 1 | Status: SHIPPED | OUTPATIENT
Start: 2023-09-22 | End: 2024-05-08

## 2023-10-06 ENCOUNTER — TELEPHONE (OUTPATIENT)
Dept: CARDIOLOGY | Facility: CLINIC | Age: 78
End: 2023-10-06
Payer: COMMERCIAL

## 2023-10-06 NOTE — TELEPHONE ENCOUNTER
I placed a follow up call to patient regarding his Eliquis coverage.    I told him that unfortunately Medicare supplement plans do not allow for tier exceptions.    He said he understands and is in the process for seeing what the [facility] can do for him since he is a .    So for now, we will wait and see how that process plays out and if he needs our assistance he will reach out to us.

## 2023-10-27 RX ORDER — METOPROLOL TARTRATE 25 MG/1
TABLET, FILM COATED ORAL
Qty: 60 TABLET | Refills: 1 | OUTPATIENT
Start: 2023-10-27

## 2023-11-15 ENCOUNTER — HOSPITAL ENCOUNTER (EMERGENCY)
Facility: CLINIC | Age: 78
Discharge: HOME | End: 2023-11-15
Attending: EMERGENCY MEDICINE | Admitting: EMERGENCY MEDICINE
Payer: COMMERCIAL

## 2023-11-15 VITALS
BODY MASS INDEX: 28.63 KG/M2 | RESPIRATION RATE: 16 BRPM | SYSTOLIC BLOOD PRESSURE: 141 MMHG | WEIGHT: 200 LBS | HEIGHT: 70 IN | TEMPERATURE: 98 F | HEART RATE: 73 BPM | DIASTOLIC BLOOD PRESSURE: 82 MMHG | OXYGEN SATURATION: 97 %

## 2023-11-15 DIAGNOSIS — L76.21 POSTOPERATIVE HEMORRHAGE OF SKIN FOLLOWING DERMATOLOGIC PROCEDURE: ICD-10-CM

## 2023-11-15 PROCEDURE — 12002 RPR S/N/AX/GEN/TRNK2.6-7.5CM: CPT

## 2023-11-15 PROCEDURE — 99283 EMERGENCY DEPT VISIT LOW MDM: CPT

## 2023-11-15 ASSESSMENT — ACTIVITIES OF DAILY LIVING (ADL): ADLS_ACUITY_SCORE: 35

## 2023-11-15 NOTE — ED TRIAGE NOTES
Patient was at the dermatologist and had skin CA screening done, post op site was string line sutured and started to bleed while patient was at work, bleeding was no in control and EMS was called and he was brought in due to patient being on blood thinner.

## 2023-11-15 NOTE — DISCHARGE INSTRUCTIONS
Return to the ER for any recurrent bleeding or further problems.    Follow-up with your dermatologist as arranged.  You will need to have the sutures removed.  This should happen in 10 days.

## 2023-11-15 NOTE — ED PROVIDER NOTES
History     Chief Complaint:  uncontolled bleeding       HPI   Gilbert Ferrer is a 77 year old male who had to skin lesions removed from the anterior neck, 1 on the right and 1 on the left.  This was done at [facility], [place].  The patient is on Eliquis as well.  The procedure went well.  He left the office.  He says within an hour he noted that he was having blood on his shirt.  He presented to the ED.  He denies any dizziness or lightheadedness.  He denies any trauma or inadvertent injury to the wound.      Independent Historian:    I spoke with Dr. Latham, the patient's dermatologist who says that the surgical procedure went well.  The wound is very superficial and there was no penetration to any major blood vessels.    Review of External Notes:  Cardiology office visit reviewed from July 31, 2023 when the patient was seen and management of atrial fibrillation.  He is on long-term oral anticoagulation with Eliquis.    Medications:    amLODIPine (NORVASC) 2.5 MG tablet  apixaban ANTICOAGULANT (ELIQUIS) 5 MG tablet  atorvastatin (LIPITOR) 40 MG tablet  Cholecalciferol (VITAMIN D-3 PO)  NONFORMULARY        Past Medical History:    Past Medical History:   Diagnosis Date    Elevated PSA     Gilbert's disease     Hyperlipidemia     Left rotator cuff tear     DAVID (obstructive sleep apnea)     PAD (peripheral artery disease) (H)     Sleep apnea     Spermatocele     Stented coronary artery     Umbilical hernia     Vitamin D deficiency     Vitamin D deficiency        Past Surgical History:    Past Surgical History:   Procedure Laterality Date    ANESTHESIA CARDIOVERSION N/A 7/13/2023    Procedure: cardioversion;  Surgeon: GENERIC ANESTHESIA PROVIDER;  Location:  OR    CARDIAC SURGERY      STENT    COLONOSCOPY      HERNIORRHAPHY VENTRAL N/A 3/18/2016    Procedure: HERNIORRHAPHY VENTRAL;  Surgeon: Nicola Rivera MD;  Location:  [facility]    PHACOEMULSIFICATION CLEAR CORNEA WITH STANDARD INTRAOCULAR LENS  "IMPLANT Left 6/28/2017    Procedure: PHACOEMULSIFICATION CLEAR CORNEA WITH STANDARD INTRAOCULAR LENS IMPLANT;  LEFT EYE PHACOEMULSIFICATION CLEAR CORNEA WITH STANDARD INTRAOCULAR LENS IMPLANT ;  Surgeon: Yifan Arroyo MD;  Location: [facility]    PHACOEMULSIFICATION CLEAR CORNEA WITH STANDARD INTRAOCULAR LENS IMPLANT Right 7/26/2017    Procedure: PHACOEMULSIFICATION CLEAR CORNEA WITH STANDARD INTRAOCULAR LENS IMPLANT;  RIGHT EYE PHACOEMULSIFICATION CLEAR CORNEA WITH STANDARD INTRAOCULAR LENS IMPLANT ;  Surgeon: Yifan Arroyo MD;  Location: [facility]          Physical Exam   Patient Vitals for the past 24 hrs:   BP Temp Temp src Pulse Resp SpO2 Height Weight   11/15/23 1552 (!) 141/82 -- -- 73 16 97 % -- --   11/15/23 1501 (!) 146/99 98  F (36.7  C) Oral 66 16 96 % 1.778 m (5' 10\") 90.7 kg (200 lb)        Physical Exam  Constitutional:       General: He is not in acute distress.     Appearance: Normal appearance. He is not toxic-appearing.   HENT:      Head: Atraumatic.      Right Ear: External ear normal.      Left Ear: External ear normal.   Eyes:      General: No scleral icterus.     Conjunctiva/sclera: Conjunctivae normal.   Cardiovascular:      Rate and Rhythm: Normal rate and regular rhythm.      Heart sounds: Normal heart sounds.   Pulmonary:      Effort: Pulmonary effort is normal. No respiratory distress.      Breath sounds: Normal breath sounds.   Abdominal:      Palpations: Abdomen is soft.      Tenderness: There is no abdominal tenderness.   Musculoskeletal:         General: No deformity.      Cervical back: Neck supple.   Skin:     Capillary Refill: Capillary refill takes less than 2 seconds.      Comments: There is a dressing that is clean, dry, and intact over the right side of the neck.  There is a wound measuring 5 cm over the left anterior neck that is closed with a Prolene running suture.  There is active and continuous oozing from the central and lateral portion of the wound. "   Neurological:      General: No focal deficit present.      Mental Status: He is alert and oriented to person, place, and time.   Psychiatric:         Mood and Affect: Mood normal.         Behavior: Behavior normal.           Emergency Department Course     [facility]    Procedure: Postoperative bleeding management    Date/Time: 11/15/2023 3:49 PM    Performed by: Neil Jhaveri MD  Authorized by: Neil Jhaveri MD    Emergent condition/consent implied       ANESTHESIA    Anesthesia:  Local infiltration  Local Anesthetic:  Lidocaine 1% with epinephrine  Anesthetic Total (mL):  3      PROCEDURE  Describe Procedure: The patient has a 5 cm wound on the left anterior neck.  There was persistent and active oozing from the center and lateral aspect of the wound.  There is a running suture in place that appears to be Prolene.  The wound is loosely closed in some spaces.  9 sutures of 3-0 Ethilon were placed in a horizontal mattress suture.  This reinforced the entire length of the wound.  There is no further active bleeding.  A wound dressing was placed with TXA.  Patient Tolerance:  Patient tolerated the procedure well with no immediate complications         Emergency Department Course & Assessments:           Consultations/Discussion of Management or Tests:  Dr Latham      Disposition:  The patient was discharged to home.     Impression & Plan      Medical Decision Making:  This patient is a 77-year-old on Eliquis for atrial fibrillation.  He presents with bleeding following a dermatologic procedure.  I spoke with his dermatologist who says that the wound was very superficial and there was no penetration to any major vascular structures.  He recommended reinforcing the wound.    The wound was oozing briskly on arrival.  The wound was cleansed with Betadine and then reinforced with mattress sutures.  There is no further bleeding.  He is able to move his neck aggressively in all  directions without any bleeding elicited.  He has been up and walking around in the hallway with no further bleeding.  He feels comfortable going home.  He was advised to return here for any recurrent bleeding.  He will otherwise follow-up for ongoing management of the wound to his dermatologist.        Diagnosis:    ICD-10-CM    1. Postoperative hemorrhage of skin following dermatologic procedure  L76.21            Neil Macias MD  11/15/2023                Neil Macias MD  11/15/23 1557       Neil Macias MD  11/18/23 1308

## 2023-11-15 NOTE — ED NOTES
Neck dressings clean dry and intact. RN notified pt of the importance of resting and avoiding strenuous activities, including bending/squatting. Pt verbalized understanding.

## 2023-11-19 ENCOUNTER — HEALTH MAINTENANCE LETTER (OUTPATIENT)
Age: 78
End: 2023-11-19

## 2023-11-20 ENCOUNTER — TELEPHONE (OUTPATIENT)
Dept: CARDIOLOGY | Facility: CLINIC | Age: 78
End: 2023-11-20
Payer: COMMERCIAL

## 2023-11-20 NOTE — TELEPHONE ENCOUNTER
"Received call from Gloria Nabil, Gilbert's wife.   She explains that he had a skin \"procedure\" last week at the derm clinic, the doctor excised 2 moles on Gilbert's neck and there were 6 - 7 sutures placed.   Gilbert went home, but this area kept oozing and bleeding and finally it was bleeding so much they called 911 because it wouldn't stop.   Per Gloria, in the ED they ended up re-suturing to get the bleeding to stop.    Notes from the ED visit on 11\15:  \"There is a dressing that is clean, dry, and intact over the right side of the neck.  There is a wound measuring 5 cm over the left anterior neck that is closed with a Prolene running suture.  There is active and continuous oozing from the central and lateral portion of the wound.  \"    Gloria is asking appropriate questions about next steps, and she said they have an apmnt 11\22 with derm to possibly have the sutures removed.  Writer encouraged her to have them review if it's OK to take out sutures from the side that was bleeding so much, in case it looks like something could get opened up again and start bleeding all over.    Next, we talked about the February 2024 procedure that Gilbert has scheduled to place the \"Inspire\" implant that is supposed to help with sleep apnea.  Recommend a discussion take place between the ENT surgeon and patient and Dr. Hollis, to make a plan to hold the Eliquis so there is not a repeat of this bleeding problem.    Gloria also states that Gilbert has really been so tired ongoing past few months, and no energy at all.  He has no appetite, not even for favorite foods.  He has been slowly losing weight, down about 10 Lb since summer.   They are thinking it is related to the BP medications.  It appears that Gilbert is taking Amlodipine 2.5mg daily.  BP readings average 130 - 140 \ 70's, HR 80's and some 90's   They request if Dr. Hollis could weigh in on possible reaction to the Norvasc causing these symptoms.    Writer is routing message to Dr. Hollis.  Gilbert has apmnt " with him in January.    Anamika Holland RN on 11/20/2023 at 2:32 PM

## 2023-11-27 ENCOUNTER — TELEPHONE (OUTPATIENT)
Dept: CARDIOLOGY | Facility: CLINIC | Age: 78
End: 2023-11-27
Payer: COMMERCIAL

## 2023-11-27 NOTE — TELEPHONE ENCOUNTER
"Team received voice message from Mrs. Ferrer,  to say that Gilbert had an \"episode\" of atrial fib yesterday, 11\27.  He was symptomatic and \"turned ashen color\", they did call 911.   He was evaluated by paramedics, and he self-converted to sinus rhythm after a short while, so they did not transport him to E..    Patient would like to get seen by Dr. Hollis sooner than January.  He is very anxious and does feel terrible when he goes into A. Fib.    In review of documentation notes, patient is not taking any beta blocker because reported last summer that Gilbert was \"more tired than ever\" when taking low dose Metoprolol.  This was stopped when he had cardioversion in July, but he has had a couple of breakthrough A fib episodes.  Also seen throughout notes are continued severe fatigue and weight loss, no appetite.  Last Hgb was 14 in August.    I spoke with Gloria.  She states they just want to discuss other options with Dr. Hollis face to face.  Writer did ask if they had ever discussed EP and she said only in context of someday Gilbert may need a pacemaker.   Informed them that there are some rhythm control meds that might be appropriate for Gilbert, but that would be a Dr Hollis discussion first.   I found them apmnt in December, messaged to  team, Gilbert will be seen 12\12 at 11:15.    Anamika Holland RN on 11/27/2023 at 12:22 PM           "

## 2023-12-12 ENCOUNTER — OFFICE VISIT (OUTPATIENT)
Dept: CARDIOLOGY | Facility: CLINIC | Age: 78
End: 2023-12-12
Payer: COMMERCIAL

## 2023-12-12 VITALS
SYSTOLIC BLOOD PRESSURE: 126 MMHG | OXYGEN SATURATION: 94 % | HEIGHT: 70 IN | BODY MASS INDEX: 29.35 KG/M2 | DIASTOLIC BLOOD PRESSURE: 80 MMHG | WEIGHT: 205 LBS | HEART RATE: 70 BPM

## 2023-12-12 DIAGNOSIS — R55 SYNCOPE, UNSPECIFIED SYNCOPE TYPE: ICD-10-CM

## 2023-12-12 DIAGNOSIS — I48.91 ATRIAL FIBRILLATION WITH RVR (H): Primary | ICD-10-CM

## 2023-12-12 PROCEDURE — 99214 OFFICE O/P EST MOD 30 MIN: CPT | Performed by: INTERNAL MEDICINE

## 2023-12-12 RX ORDER — FLECAINIDE ACETATE 150 MG/1
150 TABLET ORAL EVERY 12 HOURS PRN
Qty: 20 TABLET | Refills: 3 | Status: SHIPPED | OUTPATIENT
Start: 2023-12-12

## 2023-12-12 RX ORDER — METOPROLOL TARTRATE 25 MG/1
25 TABLET, FILM COATED ORAL DAILY
Status: DISCONTINUED | OUTPATIENT
Start: 2023-12-12 | End: 2023-12-18

## 2023-12-12 NOTE — PROGRESS NOTES
HPI and Plan:   It is a pleasure to see this very pleasant 77-year-old gentleman accompanied by his wife, for follow-up of paroxysmal atrial fibrillation.    He has a CHADS2 Vascor of 3 as he is over 75 and he has hypertension.  Echocardiography demonstrated normal left ventricular systolic function though his aorta is mildly enlarged measuring between 4 to 4.3 cm    He identifies no specific triggers for his atrial fibrillation episodes but he is quite symptomatic with it.  It is almost always preceded or triggered or accompanied by nausea and he feels quite lightheaded when he has the symptoms.  More recently he had an episode of symptomatic atrial fibrillation but by the time EMS arrived about half an hour later he has reverted back into normal sinus rhythm.  His appetite has been decreasing recently and I reassured him that this is unlikely to be from atrial fibrillation.    Cardiac examination today reveals normal heart rhythm and is otherwise unremarkable    We talked about various approaches to atrial fibrillation including ablation.  I think the neck step would be pill in the pocket approach as he does not have episodes that frequently though they cause some significant symptoms.    Previously he had experienced fatigue with regular doses of Coreg and diltiazem but I think as needed dose of beta-blocker should not be an issue.  Together with this medication I have also prescribed flecainide at 300 mg to take as required.    He has no symptoms of angina or heart failure but to be on the safe side I have requested a stress echocardiogram to exclude significant ischemia.  We can also assess his aortic size with this imaging procedure as well.    If he fails the stress test I will see him sooner otherwise I have arranged follow-up in a few months time.    Orders Placed This Encounter   Procedures    Follow-Up with Cardiology    Exercise Stress Echocardiogram       Orders Placed This Encounter   Medications     flecainide (TAMBOCOR) 150 MG tablet     Sig: Take 1 tablet (150 mg) by mouth every 12 hours as needed (300 mg prescribed prn for afib)     Dispense:  20 tablet     Refill:  3    metoprolol tartrate (LOPRESSOR) tablet 25 mg       Encounter Diagnoses   Name Primary?    Atrial fibrillation with RVR (H) Yes    Syncope, unspecified syncope type        CURRENT MEDICATIONS:  Current Outpatient Medications   Medication Sig Dispense Refill    amLODIPine (NORVASC) 2.5 MG tablet Take 1 tablet (2.5 mg) by mouth daily 90 tablet 1    apixaban ANTICOAGULANT (ELIQUIS) 5 MG tablet Take 1 tablet (5 mg) by mouth 2 times daily 60 tablet 11    atorvastatin (LIPITOR) 40 MG tablet Take 1 tablet (40 mg) by mouth daily 90 tablet 3    Cholecalciferol (VITAMIN D-3 PO) Take 2,000 Units by mouth daily      flecainide (TAMBOCOR) 150 MG tablet Take 1 tablet (150 mg) by mouth every 12 hours as needed (300 mg prescribed prn for afib) 20 tablet 3    NONFORMULARY Take 1 tablet by mouth 2 times daily Super Beta         ALLERGIES     Allergies   Allergen Reactions    Mold        PAST MEDICAL HISTORY:  Past Medical History:   Diagnosis Date    Elevated PSA     Gilbert's disease     Hyperlipidemia     Left rotator cuff tear     DAVID (obstructive sleep apnea)     PAD (peripheral artery disease) (H24)     Sleep apnea     DOES NOT USE CPAP    Spermatocele     Bilateral    Stented coronary artery     Umbilical hernia     Vitamin D deficiency     Vitamin D deficiency        PAST SURGICAL HISTORY:  Past Surgical History:   Procedure Laterality Date    ANESTHESIA CARDIOVERSION N/A 7/13/2023    Procedure: cardioversion;  Surgeon: GENERIC ANESTHESIA PROVIDER;  Location:  OR    CARDIAC SURGERY      STENT    COLONOSCOPY      HERNIORRHAPHY VENTRAL N/A 3/18/2016    Procedure: HERNIORRHAPHY VENTRAL;  Surgeon: Nicola Rivera MD;  [facility]    PHACOEMULSIFICATION CLEAR CORNEA WITH STANDARD INTRAOCULAR LENS IMPLANT Left 6/28/2017    Procedure:  "PHACOEMULSIFICATION CLEAR CORNEA WITH STANDARD INTRAOCULAR LENS IMPLANT;  LEFT EYE PHACOEMULSIFICATION CLEAR CORNEA WITH STANDARD INTRAOCULAR LENS IMPLANT ;  Surgeon: Yifan Arroyo MD;  Location: [facility]    PHACOEMULSIFICATION CLEAR CORNEA WITH STANDARD INTRAOCULAR LENS IMPLANT Right 7/26/2017    Procedure: PHACOEMULSIFICATION CLEAR CORNEA WITH STANDARD INTRAOCULAR LENS IMPLANT;  RIGHT EYE PHACOEMULSIFICATION CLEAR CORNEA WITH STANDARD INTRAOCULAR LENS IMPLANT ;  Surgeon: Yifan Arroyo MD;  Location: [facility]       FAMILY HISTORY:  History reviewed. No pertinent family history.    SOCIAL HISTORY:  Social History     Socioeconomic History    Marital status:      Spouse name: None    Number of children: None    Years of education: None    Highest education level: None   Tobacco Use    Smoking status: Former    Smokeless tobacco: Never    Tobacco comments:     QUIT 1967   Substance and Sexual Activity    Alcohol use: No     Alcohol/week: 0.0 standard drinks of alcohol     Comment: NONE SINCE AGE 50    Drug use: No       Review of Systems:  Skin:  Negative     Eyes:    glasses  ENT:  Negative    Respiratory:  Positive for sleep apnea  Cardiovascular:    fatigue;Positive for;palpitations  Gastroenterology: Positive for nausea  Genitourinary:  Negative    Musculoskeletal:  Negative    Neurologic:  Negative    Psychiatric:  Negative    Heme/Lymph/Imm:  Negative    Endocrine:  Negative      Physical Exam:  Vitals: /80   Pulse 70   Ht 1.778 m (5' 10\")   Wt 93 kg (205 lb)   SpO2 94%   BMI 29.41 kg/m      Constitutional:  cooperative, alert and oriented, well developed, well nourished, in no acute distress        Skin:  warm and dry to the touch, no apparent skin lesions or masses noted          Head:  normocephalic, no masses or lesions        Eyes:  conjunctivae and lids unremarkable        Lymph:No Cervical lymphadenopathy present     ENT:  no pallor or cyanosis, dentition good        Neck:  " "carotid pulses are full and equal bilaterally, JVP normal, no carotid bruit        Respiratory:  normal breath sounds, clear to auscultation, normal A-P diameter, normal symmetry, normal respiratory excursion, no use of accessory muscles         Cardiac: regular rhythm, normal S1/S2, no S3 or S4, apical impulse not displaced, no murmurs, gallops or rubs                pulses full and equal, no bruits auscultated pulses below the femoral arteries are diminished                                      GI:  abdomen soft, non-tender, BS normoactive, no mass, no HSM, no bruits        Extremities and Muscular Skeletal:  no deformities, clubbing, cyanosis, erythema observed              Neurological:  no gross motor deficits        Psych:  Alert and Oriented x 3        Recent Lab Results:  LIPID RESULTS:  Lab Results   Component Value Date    CHOL 236 (H) 11/18/2016    HDL 44 11/18/2016     (H) 11/18/2016    TRIG 227 (H) 11/18/2016       LIVER ENZYME RESULTS:  No results found for: \"AST\", \"ALT\"    CBC RESULTS:  Lab Results   Component Value Date    WBC 9.4 02/05/2023    WBC 7.5 11/18/2016    RBC 5.52 02/05/2023    RBC 5.02 11/18/2016    HGB 17.3 02/05/2023    HGB 15.9 11/18/2016    HCT 50.8 02/05/2023    HCT 45.7 11/18/2016    MCV 92 02/05/2023    MCV 91 11/18/2016    MCH 31.3 02/05/2023    MCH 31.7 11/18/2016    MCHC 34.1 02/05/2023    MCHC 34.8 11/18/2016    RDW 12.7 02/05/2023    RDW 12.8 11/18/2016     02/05/2023     11/18/2016       BMP RESULTS:  Lab Results   Component Value Date     02/05/2023    POTASSIUM 4.0 07/13/2023    CHLORIDE 102 02/05/2023    CO2 27 02/05/2023    ANIONGAP 11 02/05/2023     (H) 02/05/2023    BUN 15.6 02/05/2023    CR 1.15 02/05/2023    CR 1.03 11/18/2016    GFRESTIMATED 66 02/05/2023    GFRESTIMATED 71 11/18/2016    GFRESTBLACK 86 11/18/2016    BOZENA 9.6 02/05/2023        A1C RESULTS:  Lab Results   Component Value Date    A1C 5.5 11/18/2016       INR " RESULTS:  Lab Results   Component Value Date    INR 0.95 11/18/2016           CC  No referring provider defined for this encounter.

## 2023-12-12 NOTE — LETTER
12/12/2023    Mio Mora, DO, DO  [address]  [facility] [address]    RE: Gilbert Marcialueger       Dear Colleague,     I had the pleasure of seeing Gilbert Ferrer in the [facility].  HPI and Plan:   It is a pleasure to see this very pleasant 77-year-old gentleman accompanied by his wife, for follow-up of paroxysmal atrial fibrillation.    He has a CHADS2 Vascor of 3 as he is over 75 and he has hypertension.  Echocardiography demonstrated normal left ventricular systolic function though his aorta is mildly enlarged measuring between 4 to 4.3 cm    He identifies no specific triggers for his atrial fibrillation episodes but he is quite symptomatic with it.  It is almost always preceded or triggered or accompanied by nausea and he feels quite lightheaded when he has the symptoms.  More recently he had an episode of symptomatic atrial fibrillation but by the time EMS arrived about half an hour later he has reverted back into normal sinus rhythm.  His appetite has been decreasing recently and I reassured him that this is unlikely to be from atrial fibrillation.    Cardiac examination today reveals normal heart rhythm and is otherwise unremarkable    We talked about various approaches to atrial fibrillation including ablation.  I think the neck step would be pill in the pocket approach as he does not have episodes that frequently though they cause some significant symptoms.    Previously he had experienced fatigue with regular doses of Coreg and diltiazem but I think as needed dose of beta-blocker should not be an issue.  Together with this medication I have also prescribed flecainide at 300 mg to take as required.    He has no symptoms of angina or heart failure but to be on the safe side I have requested a stress echocardiogram to exclude significant ischemia.  We can also assess his aortic size with this imaging procedure as well.    If he fails the stress test I will see him sooner  otherwise I have arranged follow-up in a few months time.    Orders Placed This Encounter   Procedures    Follow-Up with Cardiology    Exercise Stress Echocardiogram       Orders Placed This Encounter   Medications    flecainide (TAMBOCOR) 150 MG tablet     Sig: Take 1 tablet (150 mg) by mouth every 12 hours as needed (300 mg prescribed prn for afib)     Dispense:  20 tablet     Refill:  3    metoprolol tartrate (LOPRESSOR) tablet 25 mg       Encounter Diagnoses   Name Primary?    Atrial fibrillation with RVR (H) Yes    Syncope, unspecified syncope type        CURRENT MEDICATIONS:  Current Outpatient Medications   Medication Sig Dispense Refill    amLODIPine (NORVASC) 2.5 MG tablet Take 1 tablet (2.5 mg) by mouth daily 90 tablet 1    apixaban ANTICOAGULANT (ELIQUIS) 5 MG tablet Take 1 tablet (5 mg) by mouth 2 times daily 60 tablet 11    atorvastatin (LIPITOR) 40 MG tablet Take 1 tablet (40 mg) by mouth daily 90 tablet 3    Cholecalciferol (VITAMIN D-3 PO) Take 2,000 Units by mouth daily      flecainide (TAMBOCOR) 150 MG tablet Take 1 tablet (150 mg) by mouth every 12 hours as needed (300 mg prescribed prn for afib) 20 tablet 3    NONFORMULARY Take 1 tablet by mouth 2 times daily Super Beta         ALLERGIES     Allergies   Allergen Reactions    Mold        PAST MEDICAL HISTORY:  Past Medical History:   Diagnosis Date    Elevated PSA     Gilbert's disease     Hyperlipidemia     Left rotator cuff tear     DAVID (obstructive sleep apnea)     PAD (peripheral artery disease) (H24)     Sleep apnea     DOES NOT USE CPAP    Spermatocele     Bilateral    Stented coronary artery     Umbilical hernia     Vitamin D deficiency     Vitamin D deficiency        PAST SURGICAL HISTORY:  Past Surgical History:   Procedure Laterality Date    ANESTHESIA CARDIOVERSION N/A 7/13/2023    Procedure: cardioversion;  Surgeon: GENERIC ANESTHESIA PROVIDER;  Location: [facility]    CARDIAC SURGERY      STENT    COLONOSCOPY      HERNIORRHAPHY VENTRAL N/A  "3/18/2016    Procedure: HERNIORRHAPHY VENTRAL;  Surgeon: Nicola Rivera MD;  Location:  [facility]    PHACOEMULSIFICATION CLEAR CORNEA WITH STANDARD INTRAOCULAR LENS IMPLANT Left 6/28/2017    Procedure: PHACOEMULSIFICATION CLEAR CORNEA WITH STANDARD INTRAOCULAR LENS IMPLANT;  LEFT EYE PHACOEMULSIFICATION CLEAR CORNEA WITH STANDARD INTRAOCULAR LENS IMPLANT ;  Surgeon: Yifan Arroyo MD;  Location:  [facility]    PHACOEMULSIFICATION CLEAR CORNEA WITH STANDARD INTRAOCULAR LENS IMPLANT Right 7/26/2017    Procedure: PHACOEMULSIFICATION CLEAR CORNEA WITH STANDARD INTRAOCULAR LENS IMPLANT;  RIGHT EYE PHACOEMULSIFICATION CLEAR CORNEA WITH STANDARD INTRAOCULAR LENS IMPLANT ;  Surgeon: Yifan Arroyo MD;  Location: [facility]       FAMILY HISTORY:  History reviewed. No pertinent family history.    SOCIAL HISTORY:  Social History     Socioeconomic History    Marital status:      Spouse name: None    Number of children: None    Years of education: None    Highest education level: None   Tobacco Use    Smoking status: Former    Smokeless tobacco: Never    Tobacco comments:     QUIT 1967   Substance and Sexual Activity    Alcohol use: No     Alcohol/week: 0.0 standard drinks of alcohol     Comment: NONE SINCE AGE 50    Drug use: No       Review of Systems:  Skin:  Negative     Eyes:    glasses  ENT:  Negative    Respiratory:  Positive for sleep apnea  Cardiovascular:    fatigue;Positive for;palpitations  Gastroenterology: Positive for nausea  Genitourinary:  Negative    Musculoskeletal:  Negative    Neurologic:  Negative    Psychiatric:  Negative    Heme/Lymph/Imm:  Negative    Endocrine:  Negative      Physical Exam:  Vitals: /80   Pulse 70   Ht 1.778 m (5' 10\")   Wt 93 kg (205 lb)   SpO2 94%   BMI 29.41 kg/m      Constitutional:  cooperative, alert and oriented, well developed, well nourished, in no acute distress        Skin:  warm and dry to the touch, no apparent skin lesions or masses noted      " "    Head:  normocephalic, no masses or lesions        Eyes:  conjunctivae and lids unremarkable        Lymph:No Cervical lymphadenopathy present     ENT:  no pallor or cyanosis, dentition good        Neck:  carotid pulses are full and equal bilaterally, JVP normal, no carotid bruit        Respiratory:  normal breath sounds, clear to auscultation, normal A-P diameter, normal symmetry, normal respiratory excursion, no use of accessory muscles         Cardiac: regular rhythm, normal S1/S2, no S3 or S4, apical impulse not displaced, no murmurs, gallops or rubs                pulses full and equal, no bruits auscultated pulses below the femoral arteries are diminished                                      GI:  abdomen soft, non-tender, BS normoactive, no mass, no HSM, no bruits        Extremities and Muscular Skeletal:  no deformities, clubbing, cyanosis, erythema observed              Neurological:  no gross motor deficits        Psych:  Alert and Oriented x 3        Recent Lab Results:  LIPID RESULTS:  Lab Results   Component Value Date    CHOL 236 (H) 11/18/2016    HDL 44 11/18/2016     (H) 11/18/2016    TRIG 227 (H) 11/18/2016       LIVER ENZYME RESULTS:  No results found for: \"AST\", \"ALT\"    CBC RESULTS:  Lab Results   Component Value Date    WBC 9.4 02/05/2023    WBC 7.5 11/18/2016    RBC 5.52 02/05/2023    RBC 5.02 11/18/2016    HGB 17.3 02/05/2023    HGB 15.9 11/18/2016    HCT 50.8 02/05/2023    HCT 45.7 11/18/2016    MCV 92 02/05/2023    MCV 91 11/18/2016    MCH 31.3 02/05/2023    MCH 31.7 11/18/2016    MCHC 34.1 02/05/2023    MCHC 34.8 11/18/2016    RDW 12.7 02/05/2023    RDW 12.8 11/18/2016     02/05/2023     11/18/2016       BMP RESULTS:  Lab Results   Component Value Date     02/05/2023    POTASSIUM 4.0 07/13/2023    CHLORIDE 102 02/05/2023    CO2 27 02/05/2023    ANIONGAP 11 02/05/2023     (H) 02/05/2023    BUN 15.6 02/05/2023    CR 1.15 02/05/2023    CR 1.03 11/18/2016    " GFRESTIMATED 66 02/05/2023    GFRESTIMATED 71 11/18/2016    GFRESTBLACK 86 11/18/2016    BOZENA 9.6 02/05/2023        A1C RESULTS:  Lab Results   Component Value Date    A1C 5.5 11/18/2016       INR RESULTS:  Lab Results   Component Value Date    INR 0.95 11/18/2016           CC  No referring provider defined for this encounter.        Thank you for allowing me to participate in the care of your patient.      Sincerely,     DR ALLISON LEON MD     [facility]

## 2023-12-18 ENCOUNTER — TELEPHONE (OUTPATIENT)
Dept: CARDIOLOGY | Facility: CLINIC | Age: 78
End: 2023-12-18
Payer: COMMERCIAL

## 2023-12-18 DIAGNOSIS — I48.91 ATRIAL FIBRILLATION WITH RVR (H): ICD-10-CM

## 2023-12-18 DIAGNOSIS — I48.91 ATRIAL FIBRILLATION WITH RVR (H): Primary | ICD-10-CM

## 2023-12-18 DIAGNOSIS — I10 BENIGN ESSENTIAL HYPERTENSION: ICD-10-CM

## 2023-12-18 RX ORDER — METOPROLOL TARTRATE 25 MG/1
25 TABLET, FILM COATED ORAL DAILY
Qty: 90 TABLET | Refills: 3 | Status: SHIPPED | OUTPATIENT
Start: 2023-12-18 | End: 2023-12-18

## 2023-12-18 RX ORDER — METOPROLOL TARTRATE 25 MG/1
25 TABLET, FILM COATED ORAL DAILY
Qty: 90 TABLET | Refills: 3 | Status: SHIPPED | OUTPATIENT
Start: 2023-12-18

## 2023-12-18 NOTE — TELEPHONE ENCOUNTER
Writer was asked to change the order information for Metoprolol Tartrate 25mg daily with first dose on Tue 12/12/23 at 1200  Hold if SBP < 90 and/or HR <50   However, in the actual med order it was chosen as clinic administered.  Writer has corrected and sent to pharmacy to be dispensed to the patient.  Called and spoke with Gloria, wife.  She states they have not picked up yet, because he had so many left from the previous Rx.  I explained that I had to contact the pharmacy and make the change so it is dispensed there to the patient.  She verbalized understanding.    Anamika Holland RN on 12/18/2023 at 12:18 PM

## 2024-01-17 ENCOUNTER — HOSPITAL ENCOUNTER (OUTPATIENT)
Dept: CARDIOLOGY | Facility: CLINIC | Age: 79
Discharge: HOME | End: 2024-01-17
Attending: INTERNAL MEDICINE | Admitting: INTERNAL MEDICINE
Payer: COMMERCIAL

## 2024-01-17 DIAGNOSIS — R55 SYNCOPE, UNSPECIFIED SYNCOPE TYPE: ICD-10-CM

## 2024-01-17 DIAGNOSIS — I48.91 ATRIAL FIBRILLATION WITH RVR (H): ICD-10-CM

## 2024-01-17 PROCEDURE — 93325 DOPPLER ECHO COLOR FLOW MAPG: CPT | Mod: 26 | Performed by: INTERNAL MEDICINE

## 2024-01-17 PROCEDURE — 999N000208 ECHO STRESS ECHOCARDIOGRAM

## 2024-01-17 PROCEDURE — 93321 DOPPLER ECHO F-UP/LMTD STD: CPT | Mod: 26 | Performed by: INTERNAL MEDICINE

## 2024-01-17 PROCEDURE — 93018 CV STRESS TEST I&R ONLY: CPT | Performed by: INTERNAL MEDICINE

## 2024-01-17 PROCEDURE — 255N000002 HC RX 255 OP 636: Performed by: INTERNAL MEDICINE

## 2024-01-17 PROCEDURE — 93350 STRESS TTE ONLY: CPT | Mod: 26 | Performed by: INTERNAL MEDICINE

## 2024-01-17 PROCEDURE — 93016 CV STRESS TEST SUPVJ ONLY: CPT | Performed by: INTERNAL MEDICINE

## 2024-01-17 RX ADMIN — HUMAN ALBUMIN MICROSPHERES AND PERFLUTREN 9 ML: 10; .22 INJECTION, SOLUTION INTRAVENOUS at 14:33

## 2024-01-18 ENCOUNTER — TELEPHONE (OUTPATIENT)
Dept: CARDIOLOGY | Facility: CLINIC | Age: 79
End: 2024-01-18
Payer: COMMERCIAL

## 2024-01-18 NOTE — TELEPHONE ENCOUNTER
Patient' spouse returned call and I explained to her that his stress test showed his heart having good strong contractions meaning his heart is receiving adequate blood flow.    She asked how many days he needs to hold Eliquis for his upcoming Inspira implant for sleep apnea.    I told her that she needs to ask the physician directly if it needs to be held. If so, how many days since they are the ones that know the bleeding risk. She said that she will reach out to them.

## 2024-01-18 NOTE — TELEPHONE ENCOUNTER
"Team received information from Dr. Hollis that we should let Mr. Ferrer know about the results of the stress test.   Writer placed call but had to LVM, requested they call back to office line to talk about the results.    \"There was a borderline hypertensive BP response to exercise.  The patient exhibited no chest pain during exercise.  Target Heart Rate was achieved.  A treadmill exercise test according to the [facility] protocol was performed.  The EKG portion of this stress test was negative for inducible ischemia (see  echo results below).  Arrhythmia induced during stress: occasional PAC's.  Normal resting wall motion and no stress-induced wall motion abnormality.  Normal left ventricular function and wall motion at rest and post-stress.  This was a normal stress echocardiogram with no evidence of stress-induced  ischemia.\"    Anamika Holland RN on 1/18/2024 at 9:20 AM    "

## 2024-01-18 NOTE — TELEPHONE ENCOUNTER
Health Call Center    Phone Message    May a detailed message be left on voicemail: yes     Reason for Call: Other: Spouse returning call for Anamika Holland RN. Attempted to call the priority line, but there was no answer. Please call back to further discuss.     Action Taken: Message routed to:  Other: Cardiology    Travel Screening: Not Applicable    Thank you!  [facility]

## 2024-02-08 ENCOUNTER — TELEPHONE (OUTPATIENT)
Dept: CARDIOLOGY | Facility: CLINIC | Age: 79
End: 2024-02-08
Payer: COMMERCIAL

## 2024-02-08 NOTE — TELEPHONE ENCOUNTER
Health Call Center    Phone Message    May a detailed message be left on voicemail: yes     Reason for Call: Medication Question or concern regarding medication   Prescription Clarification  Name of Medication: Eliquis     What on the order needs clarification? Patient's spouse Gloria called stating patient had inspire device inserted yesterday and wanting to know if it is ok for patient to stay off Eliquis for another 5-6 days. Please call Gloria back to further discuss.      Action Taken: Other: Cardiology    Travel Screening: Not Applicable    Thank you!  [facility]

## 2024-02-09 NOTE — TELEPHONE ENCOUNTER
I spoke to patient's spouse and told her that we always defer to the physician that did the procedure and who requested the OAC hold in the first place.    She told me that the physician who implanted the device would prefer that Gilbert remain off Eliquis for 7 days post procedure. I told spouse to follow that recommendation.    She replied that they would do so.

## 2024-03-28 NOTE — TELEPHONE ENCOUNTER
Patient called in on 3/27 to say he twisted his back and Tylenol is not working and asking if they can take advil or hold Eliquis to take advil. I told them that these are not good options and that he should call his PCP for advice about suitable pain meds.    He said that he would do so.

## 2024-04-16 ENCOUNTER — OFFICE VISIT (OUTPATIENT)
Dept: CARDIOLOGY | Facility: CLINIC | Age: 79
End: 2024-04-16
Attending: INTERNAL MEDICINE
Payer: COMMERCIAL

## 2024-04-16 ENCOUNTER — LAB (OUTPATIENT)
Dept: LAB | Facility: CLINIC | Age: 79
End: 2024-04-16
Payer: COMMERCIAL

## 2024-04-16 VITALS
OXYGEN SATURATION: 95 % | DIASTOLIC BLOOD PRESSURE: 85 MMHG | HEIGHT: 70 IN | HEART RATE: 85 BPM | WEIGHT: 209 LBS | SYSTOLIC BLOOD PRESSURE: 128 MMHG | BODY MASS INDEX: 29.92 KG/M2

## 2024-04-16 DIAGNOSIS — R55 SYNCOPE, UNSPECIFIED SYNCOPE TYPE: ICD-10-CM

## 2024-04-16 DIAGNOSIS — I48.91 ATRIAL FIBRILLATION WITH RVR (H): ICD-10-CM

## 2024-04-16 DIAGNOSIS — I48.91 ATRIAL FIBRILLATION WITH RVR (H): Primary | ICD-10-CM

## 2024-04-16 LAB
ALBUMIN SERPL BCG-MCNC: 4.6 G/DL (ref 3.5–5.2)
ALP SERPL-CCNC: 108 U/L (ref 40–150)
ALT SERPL W P-5'-P-CCNC: 34 U/L (ref 0–70)
ANION GAP SERPL CALCULATED.3IONS-SCNC: 13 MMOL/L (ref 7–15)
AST SERPL W P-5'-P-CCNC: 26 U/L (ref 0–45)
BILIRUB DIRECT SERPL-MCNC: 0.25 MG/DL (ref 0–0.3)
BILIRUB SERPL-MCNC: 1.7 MG/DL
BUN SERPL-MCNC: 20.3 MG/DL (ref 8–23)
CALCIUM SERPL-MCNC: 9.9 MG/DL (ref 8.8–10.2)
CHLORIDE SERPL-SCNC: 104 MMOL/L (ref 98–107)
CREAT SERPL-MCNC: 1.02 MG/DL (ref 0.67–1.17)
DEPRECATED HCO3 PLAS-SCNC: 24 MMOL/L (ref 22–29)
EGFRCR SERPLBLD CKD-EPI 2021: 75 ML/MIN/1.73M2
ERYTHROCYTE [DISTWIDTH] IN BLOOD BY AUTOMATED COUNT: 13.2 % (ref 10–15)
GLUCOSE SERPL-MCNC: 96 MG/DL (ref 70–99)
HCT VFR BLD AUTO: 45.6 % (ref 40–53)
HGB BLD-MCNC: 15.7 G/DL (ref 13.3–17.7)
MCH RBC QN AUTO: 31.4 PG (ref 26.5–33)
MCHC RBC AUTO-ENTMCNC: 34.4 G/DL (ref 31.5–36.5)
MCV RBC AUTO: 91 FL (ref 78–100)
PLATELET # BLD AUTO: 212 10E3/UL (ref 150–450)
POTASSIUM SERPL-SCNC: 4.2 MMOL/L (ref 3.4–5.3)
PROT SERPL-MCNC: 7.5 G/DL (ref 6.4–8.3)
RBC # BLD AUTO: 5 10E6/UL (ref 4.4–5.9)
SODIUM SERPL-SCNC: 141 MMOL/L (ref 135–145)
TSH SERPL DL<=0.005 MIU/L-ACNC: 0.67 UIU/ML (ref 0.3–4.2)
WBC # BLD AUTO: 8.1 10E3/UL (ref 4–11)

## 2024-04-16 PROCEDURE — 85027 COMPLETE CBC AUTOMATED: CPT | Performed by: INTERNAL MEDICINE

## 2024-04-16 PROCEDURE — 99213 OFFICE O/P EST LOW 20 MIN: CPT | Performed by: INTERNAL MEDICINE

## 2024-04-16 PROCEDURE — 82248 BILIRUBIN DIRECT: CPT | Performed by: INTERNAL MEDICINE

## 2024-04-16 PROCEDURE — 84443 ASSAY THYROID STIM HORMONE: CPT | Performed by: INTERNAL MEDICINE

## 2024-04-16 PROCEDURE — 80053 COMPREHEN METABOLIC PANEL: CPT | Performed by: INTERNAL MEDICINE

## 2024-04-16 PROCEDURE — 36415 COLL VENOUS BLD VENIPUNCTURE: CPT | Performed by: INTERNAL MEDICINE

## 2024-04-16 NOTE — PROGRESS NOTES
HPI and Plan:   A pleasure to see this very pleasant gentleman accompanied by his delightful spouse.  He is here for follow-up of paroxysmal atrial fibrillation    I am very happy to hear that since I last saw him he has not had any symptomatic episodes of atrial fibrillation.  He does have the pill in the pocket approach utilizing flecainide and metoprolol    Main complaint today is 1 of persistent fatigue.  He does have obstructive sleep apnea and uses the inspire device on a regular basis.  Fatigue he means very lacking energy tickly at the end of the day.  He denies any bowel disturbances fevers or weight loss.  Appetite has decreased a little    Cardiac examination is unremarkable.    Wonder if his fatigue is due to natural aging.  However will check some basic bloods on him including TSH fasting blood sugar lability results.    They asked if amlodipine could be causing his fatigue.  This is certainly possible but unlikely.  The only way to find out is to have a therapeutic holiday of at least 1 week from this medication.  Will continue to measure the blood pressure while stopped this medication and I think anything less than 160 systolic should be acceptable for short period of time.    I have arranged see him again back in about 6 months time for continued follow-up.    Orders Placed This Encounter   Procedures    Basic metabolic panel    CBC with platelets    Comprehensive metabolic panel    TSH    Hepatic panel    Follow-Up with Cardiology       No orders of the defined types were placed in this encounter.      Encounter Diagnoses   Name Primary?    Atrial fibrillation with RVR (H) Yes    Syncope, unspecified syncope type        CURRENT MEDICATIONS:  Current Outpatient Medications   Medication Sig Dispense Refill    amLODIPine (NORVASC) 2.5 MG tablet Take 1 tablet (2.5 mg) by mouth daily 90 tablet 1    apixaban ANTICOAGULANT (ELIQUIS) 5 MG tablet Take 1 tablet (5 mg) by mouth 2 times daily 60 tablet 11     atorvastatin (LIPITOR) 40 MG tablet Take 1 tablet (40 mg) by mouth daily 90 tablet 3    Cholecalciferol (VITAMIN D-3 PO) Take 2,000 Units by mouth daily      flecainide (TAMBOCOR) 150 MG tablet Take 1 tablet (150 mg) by mouth every 12 hours as needed (300 mg prescribed prn for afib) 20 tablet 3    NONFORMULARY Take 1 tablet by mouth 2 times daily Super Beta      metoprolol tartrate (LOPRESSOR) 25 MG tablet Take 1 tablet (25 mg) by mouth daily Hold for systolic BP less than 90 or HR less than 50 (Patient not taking: Reported on 4/16/2024) 90 tablet 3       ALLERGIES     Allergies   Allergen Reactions    Mold        PAST MEDICAL HISTORY:  Past Medical History:   Diagnosis Date    Elevated PSA     Gilbert's disease     Hyperlipidemia     Left rotator cuff tear     DAVID (obstructive sleep apnea)     PAD (peripheral artery disease) (H24)     Sleep apnea     DOES NOT USE CPAP    Spermatocele     Bilateral    Stented coronary artery     Umbilical hernia     Vitamin D deficiency     Vitamin D deficiency        PAST SURGICAL HISTORY:  Past Surgical History:   Procedure Laterality Date    ANESTHESIA CARDIOVERSION N/A 7/13/2023    Procedure: cardioversion;  Surgeon: GENERIC ANESTHESIA PROVIDER;  Location:  OR    CARDIAC SURGERY      STENT    COLONOSCOPY      HERNIORRHAPHY VENTRAL N/A 3/18/2016    Procedure: HERNIORRHAPHY VENTRAL;  Surgeon: Nicola Rivera MD;  Location:  [facility]    PHACOEMULSIFICATION CLEAR CORNEA WITH STANDARD INTRAOCULAR LENS IMPLANT Left 6/28/2017    Procedure: PHACOEMULSIFICATION CLEAR CORNEA WITH STANDARD INTRAOCULAR LENS IMPLANT;  LEFT EYE PHACOEMULSIFICATION CLEAR CORNEA WITH STANDARD INTRAOCULAR LENS IMPLANT ;  Surgeon: Yifan Arroyo MD;  Location:  [facility]    PHACOEMULSIFICATION CLEAR CORNEA WITH STANDARD INTRAOCULAR LENS IMPLANT Right 7/26/2017    Procedure: PHACOEMULSIFICATION CLEAR CORNEA WITH STANDARD INTRAOCULAR LENS IMPLANT;  RIGHT EYE PHACOEMULSIFICATION CLEAR CORNEA WITH STANDARD  "INTRAOCULAR LENS IMPLANT ;  Surgeon: Yiafn Arroyo MD;  Location: [facility]       FAMILY HISTORY:  No family history on file.    SOCIAL HISTORY:  Social History     Socioeconomic History    Marital status:    Tobacco Use    Smoking status: Former    Smokeless tobacco: Never    Tobacco comments:     QUIT 1967   Substance and Sexual Activity    Alcohol use: No     Alcohol/week: 0.0 standard drinks of alcohol     Comment: NONE SINCE AGE 50    Drug use: No     Social Determinants of Health      Received from [facility]    Financial Resource Strain    Received from [employer], [facility]    Social Connections       Review of Systems:  Skin:        Eyes:       ENT:       Respiratory:  Negative shortness of breath  Cardiovascular:  Negative;palpitations;chest pain;edema;fatigue;lightheadedness;dizziness    Gastroenterology:      Genitourinary:       Musculoskeletal:       Neurologic:       Psychiatric:       Heme/Lymph/Imm:       Endocrine:  Negative thyroid disorder;diabetes    Physical Exam:  Vitals: /85 (BP Location: Left arm, Patient Position: Sitting)   Pulse 85   Ht 1.778 m (5' 10\")   Wt 94.8 kg (209 lb)   SpO2 95%   BMI 29.99 kg/m      Constitutional:  cooperative, alert and oriented, well developed, well nourished, in no acute distress        Skin:  warm and dry to the touch, no apparent skin lesions or masses noted          Head:  normocephalic, no masses or lesions        Eyes:  conjunctivae and lids unremarkable        Lymph:No Cervical lymphadenopathy present     ENT:  no pallor or cyanosis, dentition good        Neck:  carotid pulses are full and equal bilaterally, JVP normal, no carotid bruit        Respiratory:  normal breath sounds, clear to auscultation, normal A-P diameter, normal symmetry, normal respiratory excursion, no use of " "accessory muscles         Cardiac: regular rhythm, normal S1/S2, no S3 or S4, apical impulse not displaced, no murmurs, gallops or rubs                pulses full and equal, no bruits auscultated pulses below the femoral arteries are diminished                                      GI:  abdomen soft, non-tender, BS normoactive, no mass, no HSM, no bruits        Extremities and Muscular Skeletal:  no deformities, clubbing, cyanosis, erythema observed              Neurological:  no gross motor deficits        Psych:  Alert and Oriented x 3        Recent Lab Results:  LIPID RESULTS:  Lab Results   Component Value Date    CHOL 236 (H) 11/18/2016    HDL 44 11/18/2016     (H) 11/18/2016    TRIG 227 (H) 11/18/2016       LIVER ENZYME RESULTS:  No results found for: \"AST\", \"ALT\"    CBC RESULTS:  Lab Results   Component Value Date    WBC 9.4 02/05/2023    WBC 7.5 11/18/2016    RBC 5.52 02/05/2023    RBC 5.02 11/18/2016    HGB 17.3 02/05/2023    HGB 15.9 11/18/2016    HCT 50.8 02/05/2023    HCT 45.7 11/18/2016    MCV 92 02/05/2023    MCV 91 11/18/2016    MCH 31.3 02/05/2023    MCH 31.7 11/18/2016    MCHC 34.1 02/05/2023    MCHC 34.8 11/18/2016    RDW 12.7 02/05/2023    RDW 12.8 11/18/2016     02/05/2023     11/18/2016       BMP RESULTS:  Lab Results   Component Value Date     02/05/2023    POTASSIUM 4.0 07/13/2023    CHLORIDE 102 02/05/2023    CO2 27 02/05/2023    ANIONGAP 11 02/05/2023     (H) 02/05/2023    BUN 15.6 02/05/2023    CR 1.15 02/05/2023    CR 1.03 11/18/2016    GFRESTIMATED 66 02/05/2023    GFRESTIMATED 71 11/18/2016    GFRESTBLACK 86 11/18/2016    BOZENA 9.6 02/05/2023        A1C RESULTS:  Lab Results   Component Value Date    A1C 5.5 11/18/2016       INR RESULTS:  Lab Results   Component Value Date    INR 0.95 11/18/2016           CC  Gee Hollis MD  [address]  [place] [id number]                 "

## 2024-04-16 NOTE — LETTER
4/16/2024    Mio Mora, DO, DO  [address]  [facility] [address]    RE: Gilbert PRESLEY Ferrer       Dear Colleague,     I had the pleasure of seeing Gilbert Ferrer in the [facility].  HPI and Plan:   A pleasure to see this very pleasant gentleman accompanied by his delightful spouse.  He is here for follow-up of paroxysmal atrial fibrillation    I am very happy to hear that since I last saw him he has not had any symptomatic episodes of atrial fibrillation.  He does have the pill in the pocket approach utilizing flecainide and metoprolol    Main complaint today is 1 of persistent fatigue.  He does have obstructive sleep apnea and uses the inspire device on a regular basis.  Fatigue he means very lacking energy tickly at the end of the day.  He denies any bowel disturbances fevers or weight loss.  Appetite has decreased a little    Cardiac examination is unremarkable.    Wonder if his fatigue is due to natural aging.  However will check some basic bloods on him including TSH fasting blood sugar lability results.    They asked if amlodipine could be causing his fatigue.  This is certainly possible but unlikely.  The only way to find out is to have a therapeutic holiday of at least 1 week from this medication.  Will continue to measure the blood pressure while stopped this medication and I think anything less than 160 systolic should be acceptable for short period of time.    I have arranged see him again back in about 6 months time for continued follow-up.    Orders Placed This Encounter   Procedures    Basic metabolic panel    CBC with platelets    Comprehensive metabolic panel    TSH    Hepatic panel    Follow-Up with Cardiology       No orders of the defined types were placed in this encounter.      Encounter Diagnoses   Name Primary?    Atrial fibrillation with RVR (H) Yes    Syncope, unspecified syncope type        CURRENT MEDICATIONS:  Current Outpatient Medications   Medication Sig  Dispense Refill    amLODIPine (NORVASC) 2.5 MG tablet Take 1 tablet (2.5 mg) by mouth daily 90 tablet 1    apixaban ANTICOAGULANT (ELIQUIS) 5 MG tablet Take 1 tablet (5 mg) by mouth 2 times daily 60 tablet 11    atorvastatin (LIPITOR) 40 MG tablet Take 1 tablet (40 mg) by mouth daily 90 tablet 3    Cholecalciferol (VITAMIN D-3 PO) Take 2,000 Units by mouth daily      flecainide (TAMBOCOR) 150 MG tablet Take 1 tablet (150 mg) by mouth every 12 hours as needed (300 mg prescribed prn for afib) 20 tablet 3    NONFORMULARY Take 1 tablet by mouth 2 times daily Super Beta      metoprolol tartrate (LOPRESSOR) 25 MG tablet Take 1 tablet (25 mg) by mouth daily Hold for systolic BP less than 90 or HR less than 50 (Patient not taking: Reported on 4/16/2024) 90 tablet 3       ALLERGIES     Allergies   Allergen Reactions    Mold        PAST MEDICAL HISTORY:  Past Medical History:   Diagnosis Date    Elevated PSA     Gilbert's disease     Hyperlipidemia     Left rotator cuff tear     DAVID (obstructive sleep apnea)     PAD (peripheral artery disease) (H24)     Sleep apnea     DOES NOT USE CPAP    Spermatocele     Bilateral    Stented coronary artery     Umbilical hernia     Vitamin D deficiency     Vitamin D deficiency        PAST SURGICAL HISTORY:  Past Surgical History:   Procedure Laterality Date    ANESTHESIA CARDIOVERSION N/A 7/13/2023    Procedure: cardioversion;  Surgeon: GENERIC ANESTHESIA PROVIDER;  Location:  OR    CARDIAC SURGERY      STENT    COLONOSCOPY      HERNIORRHAPHY VENTRAL N/A 3/18/2016    Procedure: HERNIORRHAPHY VENTRAL;  Surgeon: Nicola Rivera MD;  Location:  [facility]    PHACOEMULSIFICATION CLEAR CORNEA WITH STANDARD INTRAOCULAR LENS IMPLANT Left 6/28/2017    Procedure: PHACOEMULSIFICATION CLEAR CORNEA WITH STANDARD INTRAOCULAR LENS IMPLANT;  LEFT EYE PHACOEMULSIFICATION CLEAR CORNEA WITH STANDARD INTRAOCULAR LENS IMPLANT ;  Surgeon: Yifan Arroyo MD;  Location:  [facility]    PHACOEMULSIFICATION  "CLEAR CORNEA WITH STANDARD INTRAOCULAR LENS IMPLANT Right 7/26/2017    Procedure: PHACOEMULSIFICATION CLEAR CORNEA WITH STANDARD INTRAOCULAR LENS IMPLANT;  RIGHT EYE PHACOEMULSIFICATION CLEAR CORNEA WITH STANDARD INTRAOCULAR LENS IMPLANT ;  Surgeon: Yifan Arroyo MD;  Location: [facility]       FAMILY HISTORY:  No family history on file.    SOCIAL HISTORY:  Social History     Socioeconomic History    Marital status:    Tobacco Use    Smoking status: Former    Smokeless tobacco: Never    Tobacco comments:     QUIT 1967   Substance and Sexual Activity    Alcohol use: No     Alcohol/week: 0.0 standard drinks of alcohol     Comment: NONE SINCE AGE 50    Drug use: No     Social Determinants of Health      Received from [employer]    Financial Resource Strain    Received from [employer] [employer]    Social Connections       Review of Systems:  Skin:        Eyes:       ENT:       Respiratory:  Negative shortness of breath  Cardiovascular:  Negative;palpitations;chest pain;edema;fatigue;lightheadedness;dizziness    Gastroenterology:      Genitourinary:       Musculoskeletal:       Neurologic:       Psychiatric:       Heme/Lymph/Imm:       Endocrine:  Negative thyroid disorder;diabetes    Physical Exam:  Vitals: /85 (BP Location: Left arm, Patient Position: Sitting)   Pulse 85   Ht 1.778 m (5' 10\")   Wt 94.8 kg (209 lb)   SpO2 95%   BMI 29.99 kg/m      Constitutional:  cooperative, alert and oriented, well developed, well nourished, in no acute distress        Skin:  warm and dry to the touch, no apparent skin lesions or masses noted          Head:  normocephalic, no masses or lesions        Eyes:  conjunctivae and lids unremarkable        Lymph:No Cervical lymphadenopathy present     ENT:  no pallor or cyanosis, dentition good        Neck:  carotid pulses " "are full and equal bilaterally, JVP normal, no carotid bruit        Respiratory:  normal breath sounds, clear to auscultation, normal A-P diameter, normal symmetry, normal respiratory excursion, no use of accessory muscles         Cardiac: regular rhythm, normal S1/S2, no S3 or S4, apical impulse not displaced, no murmurs, gallops or rubs                pulses full and equal, no bruits auscultated pulses below the femoral arteries are diminished                                      GI:  abdomen soft, non-tender, BS normoactive, no mass, no HSM, no bruits        Extremities and Muscular Skeletal:  no deformities, clubbing, cyanosis, erythema observed              Neurological:  no gross motor deficits        Psych:  Alert and Oriented x 3        Recent Lab Results:  LIPID RESULTS:  Lab Results   Component Value Date    CHOL 236 (H) 11/18/2016    HDL 44 11/18/2016     (H) 11/18/2016    TRIG 227 (H) 11/18/2016       LIVER ENZYME RESULTS:  No results found for: \"AST\", \"ALT\"    CBC RESULTS:  Lab Results   Component Value Date    WBC 9.4 02/05/2023    WBC 7.5 11/18/2016    RBC 5.52 02/05/2023    RBC 5.02 11/18/2016    HGB 17.3 02/05/2023    HGB 15.9 11/18/2016    HCT 50.8 02/05/2023    HCT 45.7 11/18/2016    MCV 92 02/05/2023    MCV 91 11/18/2016    MCH 31.3 02/05/2023    MCH 31.7 11/18/2016    MCHC 34.1 02/05/2023    MCHC 34.8 11/18/2016    RDW 12.7 02/05/2023    RDW 12.8 11/18/2016     02/05/2023     11/18/2016       BMP RESULTS:  Lab Results   Component Value Date     02/05/2023    POTASSIUM 4.0 07/13/2023    CHLORIDE 102 02/05/2023    CO2 27 02/05/2023    ANIONGAP 11 02/05/2023     (H) 02/05/2023    BUN 15.6 02/05/2023    CR 1.15 02/05/2023    CR 1.03 11/18/2016    GFRESTIMATED 66 02/05/2023    GFRESTIMATED 71 11/18/2016    GFRESTBLACK 86 11/18/2016    BOZENA 9.6 02/05/2023        A1C RESULTS:  Lab Results   Component Value Date    A1C 5.5 11/18/2016       INR RESULTS:  Lab Results "   Component Value Date    INR 0.95 11/18/2016           CC  Allison Hollis MD  [address]  [place] [address]      Thank you for allowing me to participate in the care of your patient.      Sincerely,     DR ALLISON HOLLIS MD     [facility]

## 2024-04-17 ENCOUNTER — TELEPHONE (OUTPATIENT)
Dept: CARDIOLOGY | Facility: CLINIC | Age: 79
End: 2024-04-17
Payer: COMMERCIAL

## 2024-04-17 NOTE — TELEPHONE ENCOUNTER
Team received request from Dr. Hollis to notify Gilbert that his lab results from yesterday do not show anything concerning that would cause extra fatigue.   The only abnormal is bilirubin of 1.7.   [facility]    Writer called and spoke with Gilbert's wife, Chelsy, since Gilbert has such a difficult time hearing on the phone.  Gave her Dr. Hollis's message.    Writer sent Gilbert a [employer] message with Dr. Hollis's request for him to follow up with PCP about this result.    Anamika Holland RN on 4/17/2024 at 3:58 PM

## 2024-05-08 DIAGNOSIS — I48.91 ATRIAL FIBRILLATION WITH RVR (H): ICD-10-CM

## 2024-05-08 DIAGNOSIS — I10 BENIGN ESSENTIAL HYPERTENSION: ICD-10-CM

## 2024-05-08 RX ORDER — AMLODIPINE BESYLATE 2.5 MG/1
2.5 TABLET ORAL DAILY
Qty: 90 TABLET | Refills: 4 | Status: SHIPPED | OUTPATIENT
Start: 2024-05-08 | End: 2024-07-01

## 2024-06-06 DIAGNOSIS — I48.91 ATRIAL FIBRILLATION WITH RVR (H): ICD-10-CM

## 2024-07-01 ENCOUNTER — TELEPHONE (OUTPATIENT)
Dept: CARDIOLOGY | Facility: CLINIC | Age: 79
End: 2024-07-01
Payer: COMMERCIAL

## 2024-07-01 DIAGNOSIS — I10 BENIGN ESSENTIAL HYPERTENSION: ICD-10-CM

## 2024-07-01 DIAGNOSIS — I48.91 ATRIAL FIBRILLATION WITH RVR (H): ICD-10-CM

## 2024-07-01 RX ORDER — AMLODIPINE BESYLATE 2.5 MG/1
2.5 TABLET ORAL DAILY
COMMUNITY
Start: 2024-07-01

## 2024-07-01 NOTE — TELEPHONE ENCOUNTER
Team received call from Gloria, Gilbert's wife, who does phone calls for him.  They are wondering about fatigue and medication relationship.    Back in April, Gilbert was wondering if the fatigue he has is related to the Amlodipine, because he felt strongly that the two coincided with timing of increase in fatigue.    Gloria states that Gilbert was diagnosed with CoVid in early June.  He is still very tired, and is again wondering if the Amlodipine is part or all of the cause.    Writer reviewed Dr. Hollis's last OV note, and the notes regarding Amlodipine from April.       Conversation with Gloria, mutually agreed that Gilbert will hold the amlodipine for 2 weeks, checking BP every morning at about the same time.  If BP less than 150 systolic all the time, then we will approach Dr. Hollis with the data to request recommendation.   Educated Gloria that Gilbert is certainly not fully recovered from CoVid yet, because it has barely been a month since he tested positive.  She said she was surprised that it might take longer than that.   She informed writer that Gilbert is doing well with his fluid intake and taking breaks to rest, but that he still just feels tired all the time.    Plan:  Gloria will call in 2 weeks time with the results of BP and pulse readings.    Anamika Holland RN on 7/1/2024 at 2:26 PM             Mid-Level Procedure Text (A): After obtaining clear surgical margins the patient was sent to a mid-level provider for surgical repair.  The patient understands they will receive post-surgical care and follow-up from the mid-level provider.

## 2024-07-17 ENCOUNTER — TELEPHONE (OUTPATIENT)
Dept: CARDIOLOGY | Facility: CLINIC | Age: 79
End: 2024-07-17
Payer: COMMERCIAL

## 2024-07-17 NOTE — TELEPHONE ENCOUNTER
Per previous conversation with staff, patient was holding amlodipine and monitoring BP to see if that is affecting his energy level.     Patient has been OFF Amlodipine since 7/1/24  BP has been 120-130's systolic   Diastolic low 70's   HR 50-60's     No change in fatigue with discontinuation of med.  Wife wondering if patient can continue to be off, unless BP consistently elevated >140/90?  Does have appointment with PCP next week to discuss other causes of sx as well.     Please advise.     Thank you,   Timoteo ROMERO RN

## 2024-07-17 NOTE — TELEPHONE ENCOUNTER
[facility] Call Center    Phone Message    May a detailed message be left on voicemail: yes     Reason for Call: Symptoms or Concerns     If patient has red-flag symptoms, warm transfer to triage line    Current symptom or concern: Low energy and slightly low BP    Symptoms have been present for:  3 month(s)    Has patient previously been seen for this? Yes    By Sofy:     Date: 4/1624    Are there any new or worsening symptoms? No    Action Taken: Other: Cardiology    Travel Screening: Not Applicable     Thank you!  [facility]

## 2024-07-18 NOTE — TELEPHONE ENCOUNTER
Called patient and spoke with wife  Agreeable to monitor BP and restart amplodipine if needed based off results    Timoteo Baker RN on 7/18/2024 at 1:46 PM

## 2024-07-22 ENCOUNTER — TELEPHONE (OUTPATIENT)
Dept: CARDIOLOGY | Facility: CLINIC | Age: 79
End: 2024-07-22
Payer: COMMERCIAL

## 2024-07-22 NOTE — TELEPHONE ENCOUNTER
"Team received call from Chelsy \"Gloria\" Nabil, on behalf of Gilbert.   (He does not like to speak on the phone, so she does the phone calls.)  Gilbert had CoVid at the beginning of June.     Main complaint is:   Fatigue, started prior to CoVid,  continues to be a problem, getting worse and not better since he had CoVid.       C\O some chest heaviness, not pain, not shortness of breath.   \"Feels like my heart is not working right\".     BP consistently less than 150\95 since stopped the Amlodipine around first of July. (Mostly 130 - 140\80s).     At home sleep study this week, related to Inspire.    Gilbert does have an appointment with PCP \"soon\".  Gloria wants to have him see Dr. Hollis as well as get in for the Echocardiogram.   Apmnt was placed on hold and  contacted, Gloria will call for the Echo apmnt.    Routing to Dr. Hollis for review.    Anamika Holland RN on 7/22/2024 at 10:53 AM                "

## 2024-07-31 ENCOUNTER — ANCILLARY PROCEDURE (OUTPATIENT)
Dept: CARDIOLOGY | Facility: CLINIC | Age: 79
End: 2024-07-31
Attending: INTERNAL MEDICINE
Payer: COMMERCIAL

## 2024-07-31 DIAGNOSIS — I48.91 ATRIAL FIBRILLATION WITH RVR (H): ICD-10-CM

## 2024-07-31 LAB — LVEF ECHO: NORMAL

## 2024-07-31 PROCEDURE — 93306 TTE W/DOPPLER COMPLETE: CPT | Performed by: INTERNAL MEDICINE

## 2024-08-02 ENCOUNTER — OFFICE VISIT (OUTPATIENT)
Dept: CARDIOLOGY | Facility: CLINIC | Age: 79
End: 2024-08-02
Payer: COMMERCIAL

## 2024-08-02 VITALS
WEIGHT: 201 LBS | DIASTOLIC BLOOD PRESSURE: 72 MMHG | BODY MASS INDEX: 28.77 KG/M2 | HEART RATE: 60 BPM | SYSTOLIC BLOOD PRESSURE: 144 MMHG | HEIGHT: 70 IN

## 2024-08-02 DIAGNOSIS — R55 SYNCOPE, UNSPECIFIED SYNCOPE TYPE: ICD-10-CM

## 2024-08-02 DIAGNOSIS — I48.91 ATRIAL FIBRILLATION WITH RVR (H): ICD-10-CM

## 2024-08-02 PROCEDURE — 99214 OFFICE O/P EST MOD 30 MIN: CPT | Performed by: INTERNAL MEDICINE

## 2024-08-02 RX ORDER — VITAMIN E (DL,TOCOPHERYL ACET) 180 MG
CAPSULE ORAL
COMMUNITY

## 2024-08-02 NOTE — LETTER
8/2/2024    Mio Mora, DO, DO  [address]  [facility] [address]    RE: Gilbert Ferrer       Dear Colleague,     I had the pleasure of seeing Gilbert Ferrer in the [facility].  HPI and Plan:   The pleasure to see this very pleasant 78-year-old gentleman again was accompanied by his delightful wife.  He is here for follow-up of paroxysmal atrial fibrillation as well as assessment of fatigue    This gentleman had a normal stress echocardiogram in January of this year.  He achieved 6 minutes on a Humble protocol.  He does have sleep apnea and uses inspire.  He has had a recent 3-day assessment of the device effectiveness and is scheduled for follow-up with his sleep medicine provider in 2 weeks time.    To the best of his knowledge he has not had any recurrences of paroxysmal A-fib.  He does have flecainide and metoprolol which he uses as a pill in the pocket approach but he has not had to use them.    There was a small possibility that amlodipine was leading to his fatigue and after stopping this medication for more than a week there was no improvement in his symptomatology but his blood pressure was up to the 1 40-1 50 range systolic.  I advised resuming this medication again    As well as fatigue he has a sensation that his heart is beating faster and as a consequence he feels discomfort which is constant.  It is not worse on exertion    Cardiac examination is normal.  He has regular heart rhythm and normal heart rate    He had a recent echocardiogram which I personally reviewed.  Continues to be essentially structurally normal.  He does have mild aortic regurgitation and this is not hemodynamically significant and not likely to be a cause of his symptoms    I think mainly for reassurance purposes I will request a stress echocardiogram.  If this is normal and given the atypicality of his symptoms I think coronary artery disease is unlikely to be a contributor    In addition I have  noted to have recent labs including CBC and BMP together with TSH are essentially normal    At this stage I am not entirely sure that there is a physical cause for this gentleman symptoms and I will touch base with him after I see the stress echocardiogram results.    Provisionally arrange to see him again in 6 months time for continued follow-up.    Orders Placed This Encounter   Procedures     Follow-Up with Cardiology     Exercise Stress Echocardiogram       Orders Placed This Encounter   Medications     Probiotic Product (PROBIOTIC ACIDOPHILUS) CHEW     Si-2 gummies per day       Encounter Diagnoses   Name Primary?     Atrial fibrillation with RVR (H)      Syncope, unspecified syncope type        CURRENT MEDICATIONS:  Current Outpatient Medications   Medication Sig Dispense Refill     apixaban ANTICOAGULANT (ELIQUIS) 5 MG tablet Take 1 tablet (5 mg) by mouth 2 times daily 180 tablet 3     atorvastatin (LIPITOR) 40 MG tablet Take 1 tablet (40 mg) by mouth daily 90 tablet 3     Cholecalciferol (VITAMIN D-3 PO) Take 2,000 Units by mouth daily       flecainide (TAMBOCOR) 150 MG tablet Take 1 tablet (150 mg) by mouth every 12 hours as needed (300 mg prescribed prn for afib) 20 tablet 3     metoprolol tartrate (LOPRESSOR) 25 MG tablet Take 1 tablet (25 mg) by mouth daily Hold for systolic BP less than 90 or HR less than 50 (Patient taking differently: Take 25 mg by mouth as needed Hold for systolic BP less than 90 or HR less than 50) 90 tablet 3     NONFORMULARY Take 1 tablet by mouth 2 times daily Super Beta       Probiotic Product (PROBIOTIC ACIDOPHILUS) CHEW 1-2 gummies per day       amLODIPine (NORVASC) 2.5 MG tablet Take 1 tablet (2.5 mg) by mouth daily (Patient not taking: Reported on 2024)         ALLERGIES     Allergies   Allergen Reactions     Mold        PAST MEDICAL HISTORY:  Past Medical History:   Diagnosis Date     Elevated PSA      Gilbert's disease      Hyperlipidemia      Left rotator cuff  tear      DAVID (obstructive sleep apnea)      PAD (peripheral artery disease) (H24)      Sleep apnea     DOES NOT USE CPAP     Spermatocele     Bilateral     Stented coronary artery      Umbilical hernia      Vitamin D deficiency      Vitamin D deficiency        PAST SURGICAL HISTORY:  Past Surgical History:   Procedure Laterality Date     ANESTHESIA CARDIOVERSION N/A 7/13/2023    Procedure: cardioversion;  Surgeon: GENERIC ANESTHESIA PROVIDER;  Location:  OR     CARDIAC SURGERY      STENT     COLONOSCOPY       HERNIORRHAPHY VENTRAL N/A 3/18/2016    Procedure: HERNIORRHAPHY VENTRAL;  Surgeon: Nicola Rivera MD;  Location:  [facility]     PHACOEMULSIFICATION CLEAR CORNEA WITH STANDARD INTRAOCULAR LENS IMPLANT Left 6/28/2017    Procedure: PHACOEMULSIFICATION CLEAR CORNEA WITH STANDARD INTRAOCULAR LENS IMPLANT;  LEFT EYE PHACOEMULSIFICATION CLEAR CORNEA WITH STANDARD INTRAOCULAR LENS IMPLANT ;  Surgeon: Yifan Arroyo MD;  Location:  [facility]     PHACOEMULSIFICATION CLEAR CORNEA WITH STANDARD INTRAOCULAR LENS IMPLANT Right 7/26/2017    Procedure: PHACOEMULSIFICATION CLEAR CORNEA WITH STANDARD INTRAOCULAR LENS IMPLANT;  RIGHT EYE PHACOEMULSIFICATION CLEAR CORNEA WITH STANDARD INTRAOCULAR LENS IMPLANT ;  Surgeon: Yifan Arroyo MD;  Location:  [facility]       FAMILY HISTORY:  No family history on file.    SOCIAL HISTORY:  Social History     Socioeconomic History     Marital status:    Tobacco Use     Smoking status: Former     Smokeless tobacco: Never     Tobacco comments:     QUIT 1967   Substance and Sexual Activity     Alcohol use: No     Alcohol/week: 0.0 standard drinks of alcohol     Comment: NONE SINCE AGE 50     Drug use: No     Social Determinants of Health      [facility]    Financial Resource Strain    [facility]  "Affiliates    Social Connections       Review of Systems:  Skin:        Eyes:       ENT:       Respiratory:  Positive for sleep apnea;CPAP  Cardiovascular:  Negative    Gastroenterology:      Genitourinary:       Musculoskeletal:       Neurologic:       Psychiatric:       Heme/Lymph/Imm:       Endocrine:  Negative thyroid disorder;diabetes    Physical Exam:  Vitals: BP (!) 144/72   Pulse 60   Ht 1.778 m (5' 10\")   Wt 91.2 kg (201 lb)   BMI 28.84 kg/m      Constitutional:  cooperative, alert and oriented, well developed, well nourished, in no acute distress        Skin:  warm and dry to the touch, no apparent skin lesions or masses noted          Head:  normocephalic, no masses or lesions        Eyes:  conjunctivae and lids unremarkable        Lymph:No Cervical lymphadenopathy present     ENT:  no pallor or cyanosis, dentition good        Neck:  carotid pulses are full and equal bilaterally, JVP normal, no carotid bruit        Respiratory:  normal breath sounds, clear to auscultation, normal A-P diameter, normal symmetry, normal respiratory excursion, no use of accessory muscles         Cardiac: regular rhythm, normal S1/S2, no S3 or S4, apical impulse not displaced, no murmurs, gallops or rubs                pulses full and equal, no bruits auscultated pulses below the femoral arteries are diminished                                      GI:  abdomen soft, non-tender, BS normoactive, no mass, no HSM, no bruits        Extremities and Muscular Skeletal:  no deformities, clubbing, cyanosis, erythema observed              Neurological:  no gross motor deficits        Psych:  Alert and Oriented x 3        Recent Lab Results:  LIPID RESULTS:  Lab Results   Component Value Date    CHOL 236 (H) 11/18/2016    HDL 44 11/18/2016     (H) 11/18/2016    TRIG 227 (H) 11/18/2016       LIVER ENZYME RESULTS:  Lab Results   Component Value Date    AST 26 04/16/2024    ALT 34 04/16/2024       CBC RESULTS:  Lab Results "   Component Value Date    WBC 8.1 04/16/2024    WBC 7.5 11/18/2016    RBC 5.00 04/16/2024    RBC 5.02 11/18/2016    HGB 15.7 04/16/2024    HGB 15.9 11/18/2016    HCT 45.6 04/16/2024    HCT 45.7 11/18/2016    MCV 91 04/16/2024    MCV 91 11/18/2016    MCH 31.4 04/16/2024    MCH 31.7 11/18/2016    MCHC 34.4 04/16/2024    MCHC 34.8 11/18/2016    RDW 13.2 04/16/2024    RDW 12.8 11/18/2016     04/16/2024     11/18/2016       BMP RESULTS:  Lab Results   Component Value Date     04/16/2024    POTASSIUM 4.2 04/16/2024    CHLORIDE 104 04/16/2024    CO2 24 04/16/2024    ANIONGAP 13 04/16/2024    GLC 96 04/16/2024    BUN 20.3 04/16/2024    CR 1.02 04/16/2024    CR 1.03 11/18/2016    GFRESTIMATED 75 04/16/2024    GFRESTIMATED 71 11/18/2016    GFRESTBLACK 86 11/18/2016    BOZENA 9.9 04/16/2024        A1C RESULTS:  Lab Results   Component Value Date    A1C 5.5 11/18/2016       INR RESULTS:  Lab Results   Component Value Date    INR 0.95 11/18/2016           CC  Mio Chaparro Mora, DO  [address] [address]                     Thank you for allowing me to participate in the care of your patient.      Sincerely,     DR ALLISON LEON MD     [facility]  cc:   Mio Mora, DO  [address]  [address]  [place] [address]

## 2024-08-02 NOTE — PROGRESS NOTES
HPI and Plan:   The pleasure to see this very pleasant 78-year-old gentleman again was accompanied by his delightful wife.  He is here for follow-up of paroxysmal atrial fibrillation as well as assessment of fatigue    This gentleman had a normal stress echocardiogram in January of this year.  He achieved 6 minutes on a Humble protocol.  He does have sleep apnea and uses inspire.  He has had a recent 3-day assessment of the device effectiveness and is scheduled for follow-up with his sleep medicine provider in 2 weeks time.    To the best of his knowledge he has not had any recurrences of paroxysmal A-fib.  He does have flecainide and metoprolol which he uses as a pill in the pocket approach but he has not had to use them.    There was a small possibility that amlodipine was leading to his fatigue and after stopping this medication for more than a week there was no improvement in his symptomatology but his blood pressure was up to the 1 40-1 50 range systolic.  I advised resuming this medication again    As well as fatigue he has a sensation that his heart is beating faster and as a consequence he feels discomfort which is constant.  It is not worse on exertion    Cardiac examination is normal.  He has regular heart rhythm and normal heart rate    He had a recent echocardiogram which I personally reviewed.  Continues to be essentially structurally normal.  He does have mild aortic regurgitation and this is not hemodynamically significant and not likely to be a cause of his symptoms    I think mainly for reassurance purposes I will request a stress echocardiogram.  If this is normal and given the atypicality of his symptoms I think coronary artery disease is unlikely to be a contributor    In addition I have noted to have recent labs including CBC and BMP together with TSH are essentially normal    At this stage I am not entirely sure that there is a physical cause for this gentleman symptoms and I will touch base  with him after I see the stress echocardiogram results.    Provisionally arrange to see him again in 6 months time for continued follow-up.    Orders Placed This Encounter   Procedures    Follow-Up with Cardiology    Exercise Stress Echocardiogram       Orders Placed This Encounter   Medications    Probiotic Product (PROBIOTIC ACIDOPHILUS) CHEW     Si-2 gummies per day       Encounter Diagnoses   Name Primary?    Atrial fibrillation with RVR (H)     Syncope, unspecified syncope type        CURRENT MEDICATIONS:  Current Outpatient Medications   Medication Sig Dispense Refill    apixaban ANTICOAGULANT (ELIQUIS) 5 MG tablet Take 1 tablet (5 mg) by mouth 2 times daily 180 tablet 3    atorvastatin (LIPITOR) 40 MG tablet Take 1 tablet (40 mg) by mouth daily 90 tablet 3    Cholecalciferol (VITAMIN D-3 PO) Take 2,000 Units by mouth daily      flecainide (TAMBOCOR) 150 MG tablet Take 1 tablet (150 mg) by mouth every 12 hours as needed (300 mg prescribed prn for afib) 20 tablet 3    metoprolol tartrate (LOPRESSOR) 25 MG tablet Take 1 tablet (25 mg) by mouth daily Hold for systolic BP less than 90 or HR less than 50 (Patient taking differently: Take 25 mg by mouth as needed Hold for systolic BP less than 90 or HR less than 50) 90 tablet 3    NONFORMULARY Take 1 tablet by mouth 2 times daily Super Beta      Probiotic Product (PROBIOTIC ACIDOPHILUS) CHEW 1-2 gummies per day      amLODIPine (NORVASC) 2.5 MG tablet Take 1 tablet (2.5 mg) by mouth daily (Patient not taking: Reported on 2024)         ALLERGIES     Allergies   Allergen Reactions    Mold        PAST MEDICAL HISTORY:  Past Medical History:   Diagnosis Date    Elevated PSA     Gilbert's disease     Hyperlipidemia     Left rotator cuff tear     DAVID (obstructive sleep apnea)     PAD (peripheral artery disease) (H24)     Sleep apnea     DOES NOT USE CPAP    Spermatocele     Bilateral    Stented coronary artery     Umbilical hernia     Vitamin D deficiency      Vitamin D deficiency        PAST SURGICAL HISTORY:  Past Surgical History:   Procedure Laterality Date    ANESTHESIA CARDIOVERSION N/A 7/13/2023    Procedure: cardioversion;  Surgeon: GENERIC ANESTHESIA PROVIDER;  Location:  OR    CARDIAC SURGERY      STENT    COLONOSCOPY      HERNIORRHAPHY VENTRAL N/A 3/18/2016    Procedure: HERNIORRHAPHY VENTRAL;  Surgeon: Nicola Rivera MD;  Location:  [facility]    PHACOEMULSIFICATION CLEAR CORNEA WITH STANDARD INTRAOCULAR LENS IMPLANT Left 6/28/2017    Procedure: PHACOEMULSIFICATION CLEAR CORNEA WITH STANDARD INTRAOCULAR LENS IMPLANT;  LEFT EYE PHACOEMULSIFICATION CLEAR CORNEA WITH STANDARD INTRAOCULAR LENS IMPLANT ;  Surgeon: Yifan Arroyo MD;  Location:  [facility]    PHACOEMULSIFICATION CLEAR CORNEA WITH STANDARD INTRAOCULAR LENS IMPLANT Right 7/26/2017    Procedure: PHACOEMULSIFICATION CLEAR CORNEA WITH STANDARD INTRAOCULAR LENS IMPLANT;  RIGHT EYE PHACOEMULSIFICATION CLEAR CORNEA WITH STANDARD INTRAOCULAR LENS IMPLANT ;  Surgeon: Yifan Arroyo MD;  Location:  [facility]       FAMILY HISTORY:  No family history on file.    SOCIAL HISTORY:  Social History     Socioeconomic History    Marital status:    Tobacco Use    Smoking status: Former    Smokeless tobacco: Never    Tobacco comments:     QUIT 1967   Substance and Sexual Activity    Alcohol use: No     Alcohol/week: 0.0 standard drinks of alcohol     Comment: NONE SINCE AGE 50    Drug use: No     Social Determinants of Health      [facility] [employer] [employer] [facility]    Financial Resource Strain    [facility]    Social Connections       Review of Systems:  Skin:        Eyes:       ENT:       Respiratory:  Positive for sleep apnea;CPAP  Cardiovascular:  Negative    Gastroenterology:      Genitourinary:       Musculoskeletal:       Neurologic:      "  Psychiatric:       Heme/Lymph/Imm:       Endocrine:  Negative thyroid disorder;diabetes    Physical Exam:  Vitals: BP (!) 144/72   Pulse 60   Ht 1.778 m (5' 10\")   Wt 91.2 kg (201 lb)   BMI 28.84 kg/m      Constitutional:  cooperative, alert and oriented, well developed, well nourished, in no acute distress        Skin:  warm and dry to the touch, no apparent skin lesions or masses noted          Head:  normocephalic, no masses or lesions        Eyes:  conjunctivae and lids unremarkable        Lymph:No Cervical lymphadenopathy present     ENT:  no pallor or cyanosis, dentition good        Neck:  carotid pulses are full and equal bilaterally, JVP normal, no carotid bruit        Respiratory:  normal breath sounds, clear to auscultation, normal A-P diameter, normal symmetry, normal respiratory excursion, no use of accessory muscles         Cardiac: regular rhythm, normal S1/S2, no S3 or S4, apical impulse not displaced, no murmurs, gallops or rubs                pulses full and equal, no bruits auscultated pulses below the femoral arteries are diminished                                      GI:  abdomen soft, non-tender, BS normoactive, no mass, no HSM, no bruits        Extremities and Muscular Skeletal:  no deformities, clubbing, cyanosis, erythema observed              Neurological:  no gross motor deficits        Psych:  Alert and Oriented x 3        Recent Lab Results:  LIPID RESULTS:  Lab Results   Component Value Date    CHOL 236 (H) 11/18/2016    HDL 44 11/18/2016     (H) 11/18/2016    TRIG 227 (H) 11/18/2016       LIVER ENZYME RESULTS:  Lab Results   Component Value Date    AST 26 04/16/2024    ALT 34 04/16/2024       CBC RESULTS:  Lab Results   Component Value Date    WBC 8.1 04/16/2024    WBC 7.5 11/18/2016    RBC 5.00 04/16/2024    RBC 5.02 11/18/2016    HGB 15.7 04/16/2024    HGB 15.9 11/18/2016    HCT 45.6 04/16/2024    HCT 45.7 11/18/2016    MCV 91 04/16/2024    MCV 91 11/18/2016    MCH 31.4 " 04/16/2024    MCH 31.7 11/18/2016    MCHC 34.4 04/16/2024    MCHC 34.8 11/18/2016    RDW 13.2 04/16/2024    RDW 12.8 11/18/2016     04/16/2024     11/18/2016       BMP RESULTS:  Lab Results   Component Value Date     04/16/2024    POTASSIUM 4.2 04/16/2024    CHLORIDE 104 04/16/2024    CO2 24 04/16/2024    ANIONGAP 13 04/16/2024    GLC 96 04/16/2024    BUN 20.3 04/16/2024    CR 1.02 04/16/2024    CR 1.03 11/18/2016    GFRESTIMATED 75 04/16/2024    GFRESTIMATED 71 11/18/2016    GFRESTBLACK 86 11/18/2016    BOZENA 9.9 04/16/2024        A1C RESULTS:  Lab Results   Component Value Date    A1C 5.5 11/18/2016       INR RESULTS:  Lab Results   Component Value Date    INR 0.95 11/18/2016           CC  Mio Mora, DO  [address]  [address]  [place] [address]

## 2024-09-04 ENCOUNTER — HOSPITAL ENCOUNTER (OUTPATIENT)
Dept: CARDIOLOGY | Facility: CLINIC | Age: 79
Discharge: HOME | End: 2024-09-04
Attending: INTERNAL MEDICINE | Admitting: INTERNAL MEDICINE
Payer: COMMERCIAL

## 2024-09-04 DIAGNOSIS — R55 SYNCOPE, UNSPECIFIED SYNCOPE TYPE: ICD-10-CM

## 2024-09-04 DIAGNOSIS — I48.91 ATRIAL FIBRILLATION WITH RVR (H): ICD-10-CM

## 2024-09-04 PROCEDURE — 93018 CV STRESS TEST I&R ONLY: CPT | Performed by: INTERNAL MEDICINE

## 2024-09-04 PROCEDURE — 93321 DOPPLER ECHO F-UP/LMTD STD: CPT | Mod: 26 | Performed by: INTERNAL MEDICINE

## 2024-09-04 PROCEDURE — 93350 STRESS TTE ONLY: CPT | Mod: 26 | Performed by: INTERNAL MEDICINE

## 2024-09-04 PROCEDURE — 93325 DOPPLER ECHO COLOR FLOW MAPG: CPT | Mod: 26 | Performed by: INTERNAL MEDICINE

## 2024-09-04 PROCEDURE — 93016 CV STRESS TEST SUPVJ ONLY: CPT | Performed by: INTERNAL MEDICINE

## 2024-09-04 PROCEDURE — 255N000002 HC RX 255 OP 636: Performed by: INTERNAL MEDICINE

## 2024-09-04 PROCEDURE — 999N000208 ECHO STRESS ECHOCARDIOGRAM

## 2024-09-04 RX ADMIN — HUMAN ALBUMIN MICROSPHERES AND PERFLUTREN 9 ML: 10; .22 INJECTION, SOLUTION INTRAVENOUS at 11:42

## 2024-09-05 ENCOUNTER — TELEPHONE (OUTPATIENT)
Dept: CARDIOLOGY | Facility: CLINIC | Age: 79
End: 2024-09-05
Payer: COMMERCIAL

## 2024-09-05 NOTE — TELEPHONE ENCOUNTER
Team received information from Dr. Hollis regarding the Stress test yesterday.    Pls let him know results.  Fatigue unlikely cardiac.  Thx     Exercise echocardiogram is negative for myocardial ischemia.  Reduced functional capacity (7.0 METS).    Writer placed call to Gloria, Gilbert's wife.  Had to [facility], instructed her she can call us with questions.  Informed of above info.    Anamika Holland RN on 9/5/2024 at 1:51 PM

## 2024-09-16 ENCOUNTER — TELEPHONE (OUTPATIENT)
Dept: CARDIOLOGY | Facility: CLINIC | Age: 79
End: 2024-09-16
Payer: COMMERCIAL

## 2024-09-16 NOTE — TELEPHONE ENCOUNTER
Gloria (wife) calls in today to ask about what can be done regarding the cost of Eliquis.   They cannot afford the $465 per month.    Writer returned call to ask if they are completely out and we need to do something emergently, or if we can have patient assistance nurse Andree review and see if there is any financial help available.   Also, requested to know if they have tried any other option for financial assistance.    Note that last year, quote 2\6\23 was for $151\mo once the total drug cost exceeded $4660.   They may have a different Medicare Advantage plan this year.    Requested to call us back call back with answers.    Anamika Holland RN on 9/16/2024 at 4:04 PM

## 2024-09-17 NOTE — TELEPHONE ENCOUNTER
9\17   Gloria called back today and confirmed they are in the donut hole.  She said that the pharmacy offered to fill the Eliquis in a one month supply, which is a bit more affordable in one spot than the 3 month.   She said pharmacist also told her to go to  website and see if there is anything available that way to help them pay for medication.   Writer explained that is what our nurse Andree does, and inquired if Gloria would like us to have Andree help her with this.   Gloria in agreement.    Writer has routed to Andree to reach out to help them navigate for financial assistance.  Message sent to Andree.    Anamika Holland RN on 9/17/2024 at 9:05 AM

## 2024-09-18 ENCOUNTER — DOCUMENTATION ONLY (OUTPATIENT)
Dept: CARDIOLOGY | Facility: CLINIC | Age: 79
End: 2024-09-18
Payer: COMMERCIAL

## 2024-09-18 ENCOUNTER — TELEPHONE (OUTPATIENT)
Dept: CARDIOLOGY | Facility: CLINIC | Age: 79
End: 2024-09-18
Payer: COMMERCIAL

## 2024-09-18 NOTE — PROGRESS NOTES
"9\17 Gloria called back today and confirmed they are in the donut hole. She said that the pharmacy offered to fill the Eliquis in a one month supply, which is a bit more affordable in one spot than the 3 month. She said pharmacist also told her to go to  website and see if there is anything available that way to help them pay for medication. Writer explained that is what our nurse Andree does, and inquired if Gloria would like us to have Andree help her with this. Gloria in agreement.   ~~~~~~~~~~~~~~~~~~~~~~~    9--I called pts wife Gloria 9-, she was driving in her car-so a plan was made to call her back today, late morning. I did call her today and again she was out in her vehicle on the road.   I told her I would mail the Eliquis assistance program information, eligibility guidelines,application, medicare D \"extra help info\", 3%OOP info and my contact information. She was not sure they would meet income guidelines.   [facility] allows 61,320 $ Annual income. Mailed today. Wife Gloria will call me after they look at the application and income guidelines.  Andree Olivas LPN  "

## 2024-09-18 NOTE — TELEPHONE ENCOUNTER
"Gloria reports that Gilbert picked up the 30 day supply of medication from their pharmacy today, and the label\sticker is correct, but it was applied onto a bottle of Xarelto 20mg tablets.    She was not with him at the pharmacy, and reports that he did not know any differently since that is just how he is.   \"If I were not here, Gilbert would just keep taking this medication twice a day until the bottle was empty\".   Also, the cost was twice as much as [employer] said their copay would be for the Eliquis.    Gloria is calling to confirm that we did not change anything about the Rx.   We did not, and writer said that if anything changed on our end, we definitely would have called them.    Gloria will return the bottle to the pharmacy and get the correct medication.    Anamika Holland RN on 9/18/2024 at 10:22 AM    "

## 2024-09-24 DIAGNOSIS — I48.91 ATRIAL FIBRILLATION WITH RVR (H): ICD-10-CM

## 2024-09-24 NOTE — PROGRESS NOTES
9- will print eliquis RX to accompany the assistance program application. Provider portion and RX to Team 3 for signature. Once signed I will fax to [facility]  Andree Olivas LPN    *rx added back in the EMR after printing for the assistance program  Andree Olivas Lpn

## 2024-11-06 ENCOUNTER — TELEPHONE (OUTPATIENT)
Dept: CARDIOLOGY | Facility: CLINIC | Age: 79
End: 2024-11-06
Payer: COMMERCIAL

## 2024-11-06 NOTE — TELEPHONE ENCOUNTER
Gilbert's wife Gloria, called to request order for holding  Eliquis for prostate biopsy  Nov 18th.    Dr. Weiss  urology  would like him to hold for 3 - 5 days.    Writer will ask the MD provider of the day and let Gloria know.   Find out if Dr. Weiss would like us to call or send them something.    Anamika Holland RN on 11/6/2024 at 10:27 AM

## 2024-12-29 ENCOUNTER — HEALTH MAINTENANCE LETTER (OUTPATIENT)
Age: 79
End: 2024-12-29

## 2025-01-17 ENCOUNTER — TELEPHONE (OUTPATIENT)
Dept: CARDIOLOGY | Facility: CLINIC | Age: 80
End: 2025-01-17
Payer: COMMERCIAL

## 2025-01-17 NOTE — TELEPHONE ENCOUNTER
Team received voice message from Gloria and Gilbert.   It seems Gilbert's BP is starting to creep up again, and they wonder if he needs a sooner appointment than June 2025.    Writer notes that the last visit with Dr. Hollis was August 2024, and he did say to return in approx 6 months.   Echocardiogram was unremarkable at that time.    In the message, Gloria said that there have been medication changes, and Gilbert was taken off many of his BP meds.  They would like to know if he should go back on because his BP is now in the 140s over 90s.    Writer called back, LVM requested them to call me with more info about what meds he is taking, and other BP readings.  I informed them we would like to see him sooner than June.  Dr. Hollis has a couple spots on Feb 11th, or they could see Brittney Galicia on Feb 20th.    Await call back.    Anamika Holland RN on 1/17/2025 at 3:08 PM

## 2025-01-17 NOTE — TELEPHONE ENCOUNTER
Gloria and Gilbert called back, speaker phone.   They report that Gilbert is still fatigued, and has new symptom of some short term memory loss.  They are hearing from friends that Eliquis can sometimes cause short term memory loss, and will plan to ask Dr. Hollis about this.    Gilbert has not been taking Amlodipine since fall, when his BP was dropping into the 120\80 range and he would get symptomatic.   Now it is consistently in the 140\90's, even sometimes 150's.     Placed hold on apmnt with Dr. Hollis for 2\11 at 12:45, since they had not yet made a follow up apmnt and now Gilbert is getting symptoms.    Messaged  team.    Routing to Dr. Hollis to request recommendation if Gilbert should restart Amlodipine 2.5 or 5mg.      Anamika Holland RN on 1/17/2025 at 3:24 PM

## 2025-01-20 NOTE — TELEPHONE ENCOUNTER
Team received information back from Dr. Hollis to have Gilbert restart 2.5mg daily Amlodipine.    Anaergia message sent.    Anamika Holland RN on 1/20/2025 at 11:02 AM

## 2025-02-11 ENCOUNTER — OFFICE VISIT (OUTPATIENT)
Dept: CARDIOLOGY | Facility: CLINIC | Age: 80
End: 2025-02-11
Payer: COMMERCIAL

## 2025-02-11 VITALS
BODY MASS INDEX: 29.95 KG/M2 | OXYGEN SATURATION: 97 % | DIASTOLIC BLOOD PRESSURE: 82 MMHG | SYSTOLIC BLOOD PRESSURE: 132 MMHG | HEIGHT: 70 IN | HEART RATE: 71 BPM | WEIGHT: 209.2 LBS

## 2025-02-11 DIAGNOSIS — I48.91 ATRIAL FIBRILLATION WITH RVR (H): ICD-10-CM

## 2025-02-11 DIAGNOSIS — R55 SYNCOPE, UNSPECIFIED SYNCOPE TYPE: ICD-10-CM

## 2025-02-11 PROCEDURE — 99214 OFFICE O/P EST MOD 30 MIN: CPT | Performed by: INTERNAL MEDICINE

## 2025-02-11 NOTE — LETTER
2/11/2025    Mio Mora, DO, DO  [address]  [facility] [address]    RE: Gilbert Ferrer       Dear Colleague,     I had the pleasure of seeing Gilbert Ferrer in the [facility].  HPI and Plan:   Delightful 79-year-old gentleman, accompanied by his wife Rae, for follow-up for hypertension.    He has paroxysmal atrial fibrillation, CHADS2 Vasc score 3.  On Eliquis with no bleeding issues.    He has some perceived medication intolerances and 1 stage calcium stop amlodipine to see if it makes any difference to his symptom of fatigue.  It has not.  He does have sleep obstructive sleep apnea and he uses inspire.    Over the phone had recommended restarting amlodipine as stopping this medication made no difference to his symptom otology which is not surprising    His blood pressures have been between 1 40-1 50 at home.  When I measured his blood pressure is closer to 140.  He does express a reluctance to take too many medications.    Cardiac examination is unremarkable.  His wife is worried that there is a differential room blood pressure between 2 arms.  There is approximately 5 mm difference and I think this should be acceptable.    Oral anticoagulation is well-tolerated.    As long as his blood pressure is below 150 systolic I told him he should be fine.  In other word  permissive hypertension and him is fine\    I will see him again in 1 years time for continued follow-up.  I will see him sooner if his blood pressure is consistently above 150 systolic.      No orders of the defined types were placed in this encounter.      No orders of the defined types were placed in this encounter.      Encounter Diagnoses   Name Primary?     Atrial fibrillation with RVR (H)      Syncope, unspecified syncope type        CURRENT MEDICATIONS:  Current Outpatient Medications   Medication Sig Dispense Refill     apixaban ANTICOAGULANT (ELIQUIS) 5 MG tablet Take 1 tablet (5 mg) by mouth 2 times daily.  180 tablet 3     atorvastatin (LIPITOR) 40 MG tablet Take 1 tablet (40 mg) by mouth daily 90 tablet 3     Cholecalciferol (VITAMIN D-3 PO) Take 2,000 Units by mouth daily       flecainide (TAMBOCOR) 150 MG tablet Take 1 tablet (150 mg) by mouth every 12 hours as needed (300 mg prescribed prn for afib) 20 tablet 3     NONFORMULARY Take 1 tablet by mouth 2 times daily Super Beta       Probiotic Product (PROBIOTIC ACIDOPHILUS) CHEW 1-2 gummies per day       amLODIPine (NORVASC) 2.5 MG tablet Take 1 tablet (2.5 mg) by mouth daily (Patient not taking: Reported on 2/11/2025)       metoprolol tartrate (LOPRESSOR) 25 MG tablet Take 1 tablet (25 mg) by mouth daily Hold for systolic BP less than 90 or HR less than 50 (Patient not taking: Reported on 2/11/2025) 90 tablet 3       ALLERGIES     Allergies   Allergen Reactions     Mold        PAST MEDICAL HISTORY:  Past Medical History:   Diagnosis Date     Elevated PSA      Gilbert's disease      Hyperlipidemia      Left rotator cuff tear      DAVID (obstructive sleep apnea)      PAD (peripheral artery disease)      Sleep apnea     DOES NOT USE CPAP     Spermatocele     Bilateral     Stented coronary artery      Umbilical hernia      Vitamin D deficiency      Vitamin D deficiency        PAST SURGICAL HISTORY:  Past Surgical History:   Procedure Laterality Date     ANESTHESIA CARDIOVERSION N/A 7/13/2023    Procedure: cardioversion;  Surgeon: GENERIC ANESTHESIA PROVIDER;  Location:  OR     CARDIAC SURGERY      STENT     COLONOSCOPY       HERNIORRHAPHY VENTRAL N/A 3/18/2016    Procedure: HERNIORRHAPHY VENTRAL;  Surgeon: Nicola Rivera MD;  Location:  [facility]     PHACOEMULSIFICATION CLEAR CORNEA WITH STANDARD INTRAOCULAR LENS IMPLANT Left 6/28/2017    Procedure: PHACOEMULSIFICATION CLEAR CORNEA WITH STANDARD INTRAOCULAR LENS IMPLANT;  LEFT EYE PHACOEMULSIFICATION CLEAR CORNEA WITH STANDARD INTRAOCULAR LENS IMPLANT ;  Surgeon: Yifan Arroyo MD;  Location:  [facility]      "PHACOEMULSIFICATION CLEAR CORNEA WITH STANDARD INTRAOCULAR LENS IMPLANT Right 7/26/2017    Procedure: PHACOEMULSIFICATION CLEAR CORNEA WITH STANDARD INTRAOCULAR LENS IMPLANT;  RIGHT EYE PHACOEMULSIFICATION CLEAR CORNEA WITH STANDARD INTRAOCULAR LENS IMPLANT ;  Surgeon: Yifan Arroyo MD;  Location: [facility]       FAMILY HISTORY:  History reviewed. No pertinent family history.    SOCIAL HISTORY:  Social History     Socioeconomic History     Marital status:      Spouse name: None     Number of children: None     Years of education: None     Highest education level: None   Tobacco Use     Smoking status: Former     Smokeless tobacco: Never     Tobacco comments:     QUIT 1967   Substance and Sexual Activity     Alcohol use: No     Alcohol/week: 0.0 standard drinks of alcohol     Comment: NONE SINCE AGE 50     Drug use: No     Social Drivers of Health      Received from [employer]    Financial Resource Strain    Received from [employer], [employer]    Social Connections       Review of Systems:  Skin:  Negative     Eyes:  Positive for glasses  ENT:  Negative    Respiratory:  Negative    Cardiovascular:    Positive for, fatigue  Gastroenterology: Negative    Genitourinary:  Negative    Musculoskeletal:  Negative    Neurologic:  Negative    Psychiatric:  Negative    Heme/Lymph/Imm:  Negative    Endocrine:  Negative      Physical Exam:  Vitals: /82   Pulse 71   Ht 1.778 m (5' 10\")   Wt 94.9 kg (209 lb 3.2 oz)   SpO2 97%   BMI 30.02 kg/m      Constitutional:  cooperative, alert and oriented, well developed, well nourished, in no acute distress        Skin:  warm and dry to the touch, no apparent skin lesions or masses noted          Head:  normocephalic, no masses or lesions        Eyes:  conjunctivae and lids unremarkable        Lymph:No Cervical " lymphadenopathy present     ENT:  no pallor or cyanosis, dentition good        Neck:  carotid pulses are full and equal bilaterally, JVP normal, no carotid bruit        Respiratory:  normal breath sounds, clear to auscultation, normal A-P diameter, normal symmetry, normal respiratory excursion, no use of accessory muscles         Cardiac: regular rhythm, normal S1/S2, no S3 or S4, apical impulse not displaced, no murmurs, gallops or rubs                pulses full and equal, no bruits auscultated pulses below the femoral arteries are diminished                                      GI:  abdomen soft, non-tender, BS normoactive, no mass, no HSM, no bruits        Extremities and Muscular Skeletal:  no deformities, clubbing, cyanosis, erythema observed              Neurological:  no gross motor deficits        Psych:  Alert and Oriented x 3        Recent Lab Results:  LIPID RESULTS:  Lab Results   Component Value Date    CHOL 236 (H) 11/18/2016    HDL 44 11/18/2016     (H) 11/18/2016    TRIG 227 (H) 11/18/2016       LIVER ENZYME RESULTS:  Lab Results   Component Value Date    AST 26 04/16/2024    ALT 34 04/16/2024       CBC RESULTS:  Lab Results   Component Value Date    WBC 8.1 04/16/2024    WBC 7.5 11/18/2016    RBC 5.00 04/16/2024    RBC 5.02 11/18/2016    HGB 15.7 04/16/2024    HGB 15.9 11/18/2016    HCT 45.6 04/16/2024    HCT 45.7 11/18/2016    MCV 91 04/16/2024    MCV 91 11/18/2016    MCH 31.4 04/16/2024    MCH 31.7 11/18/2016    MCHC 34.4 04/16/2024    MCHC 34.8 11/18/2016    RDW 13.2 04/16/2024    RDW 12.8 11/18/2016     04/16/2024     11/18/2016       BMP RESULTS:  Lab Results   Component Value Date     04/16/2024    POTASSIUM 4.2 04/16/2024    CHLORIDE 104 04/16/2024    CO2 24 04/16/2024    ANIONGAP 13 04/16/2024    GLC 96 04/16/2024    BUN 20.3 04/16/2024    CR 1.02 04/16/2024    CR 1.03 11/18/2016    GFRESTIMATED 75 04/16/2024    GFRESTIMATED 71 11/18/2016    GFRESTBLACK 86  11/18/2016    BOZENA 9.9 04/16/2024        A1C RESULTS:  Lab Results   Component Value Date    A1C 5.5 11/18/2016       INR RESULTS:  Lab Results   Component Value Date    INR 0.95 11/18/2016           CC  Mio Mora, DO  [address]  [address]  [place] [address]                     Thank you for allowing me to participate in the care of your patient.      Sincerely,     DR ALLISON LEON MD     [facility]  cc:   Mio Mora,   [address]  [address]  [place] [address]

## 2025-02-11 NOTE — PROGRESS NOTES
HPI and Plan:   Delightful 79-year-old gentleman, accompanied by his wife Rae, for follow-up for hypertension.    He has paroxysmal atrial fibrillation, CHADS2 Vasc score 3.  On Eliquis with no bleeding issues.    He has some perceived medication intolerances and 1 stage calcium stop amlodipine to see if it makes any difference to his symptom of fatigue.  It has not.  He does have sleep obstructive sleep apnea and he uses inspire.    Over the phone had recommended restarting amlodipine as stopping this medication made no difference to his symptom otology which is not surprising    His blood pressures have been between 1 40-1 50 at home.  When I measured his blood pressure is closer to 140.  He does express a reluctance to take too many medications.    Cardiac examination is unremarkable.  His wife is worried that there is a differential room blood pressure between 2 arms.  There is approximately 5 mm difference and I think this should be acceptable.    Oral anticoagulation is well-tolerated.    As long as his blood pressure is below 150 systolic I told him he should be fine.  In other word  permissive hypertension and him is fine\    I will see him again in 1 years time for continued follow-up.  I will see him sooner if his blood pressure is consistently above 150 systolic.      No orders of the defined types were placed in this encounter.      No orders of the defined types were placed in this encounter.      Encounter Diagnoses   Name Primary?    Atrial fibrillation with RVR (H)     Syncope, unspecified syncope type        CURRENT MEDICATIONS:  Current Outpatient Medications   Medication Sig Dispense Refill    apixaban ANTICOAGULANT (ELIQUIS) 5 MG tablet Take 1 tablet (5 mg) by mouth 2 times daily. 180 tablet 3    atorvastatin (LIPITOR) 40 MG tablet Take 1 tablet (40 mg) by mouth daily 90 tablet 3    Cholecalciferol (VITAMIN D-3 PO) Take 2,000 Units by mouth daily      flecainide (TAMBOCOR) 150 MG tablet Take 1  tablet (150 mg) by mouth every 12 hours as needed (300 mg prescribed prn for afib) 20 tablet 3    NONFORMULARY Take 1 tablet by mouth 2 times daily Super Beta      Probiotic Product (PROBIOTIC ACIDOPHILUS) CHEW 1-2 gummies per day      amLODIPine (NORVASC) 2.5 MG tablet Take 1 tablet (2.5 mg) by mouth daily (Patient not taking: Reported on 2/11/2025)      metoprolol tartrate (LOPRESSOR) 25 MG tablet Take 1 tablet (25 mg) by mouth daily Hold for systolic BP less than 90 or HR less than 50 (Patient not taking: Reported on 2/11/2025) 90 tablet 3       ALLERGIES     Allergies   Allergen Reactions    Mold        PAST MEDICAL HISTORY:  Past Medical History:   Diagnosis Date    Elevated PSA     Gilbert's disease     Hyperlipidemia     Left rotator cuff tear     DAVID (obstructive sleep apnea)     PAD (peripheral artery disease)     Sleep apnea     DOES NOT USE CPAP    Spermatocele     Bilateral    Stented coronary artery     Umbilical hernia     Vitamin D deficiency     Vitamin D deficiency        PAST SURGICAL HISTORY:  Past Surgical History:   Procedure Laterality Date    ANESTHESIA CARDIOVERSION N/A 7/13/2023    Procedure: cardioversion;  Surgeon: GENERIC ANESTHESIA PROVIDER;  Location:  OR    CARDIAC SURGERY      STENT    COLONOSCOPY      HERNIORRHAPHY VENTRAL N/A 3/18/2016    Procedure: HERNIORRHAPHY VENTRAL;  Surgeon: Nicola Rivera MD;  Location:  [facility]    PHACOEMULSIFICATION CLEAR CORNEA WITH STANDARD INTRAOCULAR LENS IMPLANT Left 6/28/2017    Procedure: PHACOEMULSIFICATION CLEAR CORNEA WITH STANDARD INTRAOCULAR LENS IMPLANT;  LEFT EYE PHACOEMULSIFICATION CLEAR CORNEA WITH STANDARD INTRAOCULAR LENS IMPLANT ;  Surgeon: Yifan Arroyo MD;  Location:  [facility]    PHACOEMULSIFICATION CLEAR CORNEA WITH STANDARD INTRAOCULAR LENS IMPLANT Right 7/26/2017    Procedure: PHACOEMULSIFICATION CLEAR CORNEA WITH STANDARD INTRAOCULAR LENS IMPLANT;  RIGHT EYE PHACOEMULSIFICATION CLEAR CORNEA WITH STANDARD INTRAOCULAR LENS  "IMPLANT ;  Surgeon: Yifan Arroyo MD;  Location: [facility]       FAMILY HISTORY:  History reviewed. No pertinent family history.    SOCIAL HISTORY:  Social History     Socioeconomic History    Marital status:      Spouse name: None    Number of children: None    Years of education: None    Highest education level: None   Tobacco Use    Smoking status: Former    Smokeless tobacco: Never    Tobacco comments:     QUIT 1967   Substance and Sexual Activity    Alcohol use: No     Alcohol/week: 0.0 standard drinks of alcohol     Comment: NONE SINCE AGE 50    Drug use: No     Social Drivers of Health      Received from [employer]    Financial Resource Strain    Received from [employer]    Social Connections       Review of Systems:  Skin:  Negative     Eyes:  Positive for glasses  ENT:  Negative    Respiratory:  Negative    Cardiovascular:    Positive for, fatigue  Gastroenterology: Negative    Genitourinary:  Negative    Musculoskeletal:  Negative    Neurologic:  Negative    Psychiatric:  Negative    Heme/Lymph/Imm:  Negative    Endocrine:  Negative      Physical Exam:  Vitals: /82   Pulse 71   Ht 1.778 m (5' 10\")   Wt 94.9 kg (209 lb 3.2 oz)   SpO2 97%   BMI 30.02 kg/m      Constitutional:  cooperative, alert and oriented, well developed, well nourished, in no acute distress        Skin:  warm and dry to the touch, no apparent skin lesions or masses noted          Head:  normocephalic, no masses or lesions        Eyes:  conjunctivae and lids unremarkable        Lymph:No Cervical lymphadenopathy present     ENT:  no pallor or cyanosis, dentition good        Neck:  carotid pulses are full and equal bilaterally, JVP normal, no carotid bruit        Respiratory:  normal breath sounds, clear to auscultation, normal A-P diameter, normal symmetry, " normal respiratory excursion, no use of accessory muscles         Cardiac: regular rhythm, normal S1/S2, no S3 or S4, apical impulse not displaced, no murmurs, gallops or rubs                pulses full and equal, no bruits auscultated pulses below the femoral arteries are diminished                                      GI:  abdomen soft, non-tender, BS normoactive, no mass, no HSM, no bruits        Extremities and Muscular Skeletal:  no deformities, clubbing, cyanosis, erythema observed              Neurological:  no gross motor deficits        Psych:  Alert and Oriented x 3        Recent Lab Results:  LIPID RESULTS:  Lab Results   Component Value Date    CHOL 236 (H) 11/18/2016    HDL 44 11/18/2016     (H) 11/18/2016    TRIG 227 (H) 11/18/2016       LIVER ENZYME RESULTS:  Lab Results   Component Value Date    AST 26 04/16/2024    ALT 34 04/16/2024       CBC RESULTS:  Lab Results   Component Value Date    WBC 8.1 04/16/2024    WBC 7.5 11/18/2016    RBC 5.00 04/16/2024    RBC 5.02 11/18/2016    HGB 15.7 04/16/2024    HGB 15.9 11/18/2016    HCT 45.6 04/16/2024    HCT 45.7 11/18/2016    MCV 91 04/16/2024    MCV 91 11/18/2016    MCH 31.4 04/16/2024    MCH 31.7 11/18/2016    MCHC 34.4 04/16/2024    MCHC 34.8 11/18/2016    RDW 13.2 04/16/2024    RDW 12.8 11/18/2016     04/16/2024     11/18/2016       BMP RESULTS:  Lab Results   Component Value Date     04/16/2024    POTASSIUM 4.2 04/16/2024    CHLORIDE 104 04/16/2024    CO2 24 04/16/2024    ANIONGAP 13 04/16/2024    GLC 96 04/16/2024    BUN 20.3 04/16/2024    CR 1.02 04/16/2024    CR 1.03 11/18/2016    GFRESTIMATED 75 04/16/2024    GFRESTIMATED 71 11/18/2016    GFRESTBLACK 86 11/18/2016    BOZENA 9.9 04/16/2024        A1C RESULTS:  Lab Results   Component Value Date    A1C 5.5 11/18/2016       INR RESULTS:  Lab Results   Component Value Date    INR 0.95 11/18/2016           CC  Mio Mora, DO  [address] [facility] [address]  [place]  [address]

## 2025-03-10 ENCOUNTER — TELEPHONE (OUTPATIENT)
Dept: CARDIOLOGY | Facility: CLINIC | Age: 80
End: 2025-03-10
Payer: COMMERCIAL

## 2025-03-10 NOTE — TELEPHONE ENCOUNTER
Received phone message from Gloria and Gilbert.   They report that Gilbert's BP has been slowly creeping up lately.   It has been ranging in the 148\90  to  160\90's   She did not say how long this has been happening, if there has been a lot of readings, if this is an average reading or what.    Writer has LVM with questions.    Will route to Dr. Hollis on Monday when he returns.    Anamika Holland RN on 3/10/2025 at 8:01 AM

## 2025-03-12 ENCOUNTER — HOSPITAL ENCOUNTER (EMERGENCY)
Facility: CLINIC | Age: 80
Discharge: HOME | End: 2025-03-12
Attending: EMERGENCY MEDICINE | Admitting: EMERGENCY MEDICINE
Payer: COMMERCIAL

## 2025-03-12 VITALS
DIASTOLIC BLOOD PRESSURE: 109 MMHG | SYSTOLIC BLOOD PRESSURE: 155 MMHG | HEIGHT: 70 IN | BODY MASS INDEX: 28.63 KG/M2 | WEIGHT: 200 LBS | RESPIRATION RATE: 18 BRPM | TEMPERATURE: 98.1 F | HEART RATE: 95 BPM | OXYGEN SATURATION: 94 %

## 2025-03-12 DIAGNOSIS — R11.10 VOMITING AND DIARRHEA: ICD-10-CM

## 2025-03-12 DIAGNOSIS — E86.0 DEHYDRATION: ICD-10-CM

## 2025-03-12 DIAGNOSIS — R19.7 VOMITING AND DIARRHEA: ICD-10-CM

## 2025-03-12 LAB
ALBUMIN SERPL BCG-MCNC: 4.3 G/DL (ref 3.5–5.2)
ALP SERPL-CCNC: 105 U/L (ref 40–150)
ALT SERPL W P-5'-P-CCNC: 34 U/L (ref 0–70)
ANION GAP SERPL CALCULATED.3IONS-SCNC: 13 MMOL/L (ref 7–15)
AST SERPL W P-5'-P-CCNC: 31 U/L (ref 0–45)
BASOPHILS # BLD AUTO: 0 10E3/UL (ref 0–0.2)
BASOPHILS NFR BLD AUTO: 0 %
BILIRUB SERPL-MCNC: 2.3 MG/DL
BUN SERPL-MCNC: 27.3 MG/DL (ref 8–23)
CALCIUM SERPL-MCNC: 9 MG/DL (ref 8.8–10.4)
CHLORIDE SERPL-SCNC: 102 MMOL/L (ref 98–107)
CREAT SERPL-MCNC: 0.89 MG/DL (ref 0.67–1.17)
EGFRCR SERPLBLD CKD-EPI 2021: 87 ML/MIN/1.73M2
EOSINOPHIL # BLD AUTO: 0 10E3/UL (ref 0–0.7)
EOSINOPHIL NFR BLD AUTO: 0 %
ERYTHROCYTE [DISTWIDTH] IN BLOOD BY AUTOMATED COUNT: 13.1 % (ref 10–15)
GLUCOSE SERPL-MCNC: 134 MG/DL (ref 70–99)
HCO3 SERPL-SCNC: 22 MMOL/L (ref 22–29)
HCT VFR BLD AUTO: 45.7 % (ref 40–53)
HGB BLD-MCNC: 16 G/DL (ref 13.3–17.7)
HOLD SPECIMEN: NORMAL
IMM GRANULOCYTES # BLD: 0 10E3/UL
IMM GRANULOCYTES NFR BLD: 0 %
LYMPHOCYTES # BLD AUTO: 0.4 10E3/UL (ref 0.8–5.3)
LYMPHOCYTES NFR BLD AUTO: 4 %
MCH RBC QN AUTO: 31.5 PG (ref 26.5–33)
MCHC RBC AUTO-ENTMCNC: 35 G/DL (ref 31.5–36.5)
MCV RBC AUTO: 90 FL (ref 78–100)
MONOCYTES # BLD AUTO: 0.7 10E3/UL (ref 0–1.3)
MONOCYTES NFR BLD AUTO: 7 %
NEUTROPHILS # BLD AUTO: 9.4 10E3/UL (ref 1.6–8.3)
NEUTROPHILS NFR BLD AUTO: 89 %
NRBC # BLD AUTO: 0 10E3/UL
NRBC BLD AUTO-RTO: 0 /100
PLATELET # BLD AUTO: 166 10E3/UL (ref 150–450)
POTASSIUM SERPL-SCNC: 3.9 MMOL/L (ref 3.4–5.3)
PROT SERPL-MCNC: 7 G/DL (ref 6.4–8.3)
RBC # BLD AUTO: 5.08 10E6/UL (ref 4.4–5.9)
SODIUM SERPL-SCNC: 137 MMOL/L (ref 135–145)
WBC # BLD AUTO: 10.6 10E3/UL (ref 4–11)

## 2025-03-12 PROCEDURE — 36415 COLL VENOUS BLD VENIPUNCTURE: CPT | Performed by: EMERGENCY MEDICINE

## 2025-03-12 PROCEDURE — 258N000003 HC RX IP 258 OP 636: Performed by: EMERGENCY MEDICINE

## 2025-03-12 PROCEDURE — 96360 HYDRATION IV INFUSION INIT: CPT

## 2025-03-12 PROCEDURE — 85004 AUTOMATED DIFF WBC COUNT: CPT | Performed by: EMERGENCY MEDICINE

## 2025-03-12 PROCEDURE — 85014 HEMATOCRIT: CPT | Performed by: EMERGENCY MEDICINE

## 2025-03-12 PROCEDURE — 80053 COMPREHEN METABOLIC PANEL: CPT | Performed by: EMERGENCY MEDICINE

## 2025-03-12 PROCEDURE — 99283 EMERGENCY DEPT VISIT LOW MDM: CPT | Mod: 25

## 2025-03-12 RX ADMIN — SODIUM CHLORIDE 500 ML: 9 INJECTION, SOLUTION INTRAVENOUS at 02:38

## 2025-03-12 ASSESSMENT — ACTIVITIES OF DAILY LIVING (ADL)
ADLS_ACUITY_SCORE: 41
ADLS_ACUITY_SCORE: 41

## 2025-03-12 NOTE — ED TRIAGE NOTES
BIBA from home. Patient has been having vomiting and diarrhea for the past 24 hours. He reports to have had 1 episode of diarrhea and 1 episode of vomiting today. This evening patient felt he is weak and needed to be seen. Patient has an internal monitor for A-fib and Apnea that was inserted over an year ago following episode of A-fib. Patient's wife (retired nurse), turned off the monitor when the patient was vomiting.  He denies abd pain and no nausea for the last 1 hour.   Per EMS, patient was VSS enroute, 12 lead showed NSR. IV started and 4 mg IV Zofran given by EMS

## 2025-03-12 NOTE — ED PROVIDER NOTES
"  Emergency Department Note      History of Present Illness     Chief Complaint   Nausea, Vomiting, & Diarrhea    HPI   Gilbert Ferrer is a 79 year old male on eliquis with a history of atrial fibrillation presenting with generalized weakness and vomiting and diarrhea. Tonight the patient's wife called EMS because the patient was weaker than usual and experiencing vomiting and diarrhea. The patient has recently been in contact with his 6 month old grandson who he thinks he caught something from. The patient reports that he was also having some breathing issues two days ago.     Independent Historian   None    Review of External Notes   none    Past Medical History     Medical History and Problem List   Elevated PSA  Gilbert's disease  Hyperlipidemia  Left rotator cuff tear  DAVID (obstructive sleep apnea)  PAD (peripheral artery disease)  Sleep apnea  Spermatocele  Stented coronary artery  Umbilical hernia  Vitamin D deficiency    Medications   Norvasc  Eliquis   Lipitor   Tambocor   Lopressor   Remeron     Surgical History   Cardioversion   Cardiac stent   Colonoscopy  Hernia repair  Intralocular lens implant - bilateral       Physical Exam     Patient Vitals for the past 24 hrs:   BP Temp Temp src Pulse Resp SpO2 Height Weight   03/12/25 0300 (!) 155/109 -- -- 95 18 94 % -- --   03/12/25 0231 -- -- -- 101 25 96 % -- --   03/12/25 0230 (!) 145/85 -- -- 102 -- -- -- --   03/12/25 0215 (!) 146/86 -- -- 101 18 96 % -- --   03/12/25 0141 (!) 151/99 98.1  F (36.7  C) Tympanic 101 20 95 % 1.778 m (5' 10\") 90.7 kg (200 lb)     Physical Exam  General: Alert, No distress. Nontoxic appearance  Head: No signs of trauma.   Mouth/Throat: Oropharynx slightly dry.   Eyes: Conjunctivae are normal. Pupils are equal..   Neck: Normal range of motion.    CV: Appears well perfused.  Resp:No respiratory distress.   MSK: Normal range of motion. No obvious deformity.   Neuro: The patient is alert and interactive. FULLER. Speech normal. GCS " 15  Skin: No lesion or sign of trauma noted.   Psych: normal mood and affect. behavior is normal.   Abd: No abdominal tenderness.       Diagnostics     Lab Results   Labs Ordered and Resulted from Time of ED Arrival to Time of ED Departure   COMPREHENSIVE METABOLIC PANEL - Abnormal       Result Value    Sodium 137      Potassium 3.9      Carbon Dioxide (CO2) 22      Anion Gap 13      Urea Nitrogen 27.3 (*)     Creatinine 0.89      GFR Estimate 87      Calcium 9.0      Chloride 102      Glucose 134 (*)     Alkaline Phosphatase 105      AST 31      ALT 34      Protein Total 7.0      Albumin 4.3      Bilirubin Total 2.3 (*)    CBC WITH PLATELETS AND DIFFERENTIAL - Abnormal    WBC Count 10.6      RBC Count 5.08      Hemoglobin 16.0      Hematocrit 45.7      MCV 90      MCH 31.5      MCHC 35.0      RDW 13.1      Platelet Count 166      % Neutrophils 89      % Lymphocytes 4      % Monocytes 7      % Eosinophils 0      % Basophils 0      % Immature Granulocytes 0      NRBCs per 100 WBC 0      Absolute Neutrophils 9.4 (*)     Absolute Lymphocytes 0.4 (*)     Absolute Monocytes 0.7      Absolute Eosinophils 0.0      Absolute Basophils 0.0      Absolute Immature Granulocytes 0.0      Absolute NRBCs 0.0         Imaging   No orders to display       EKG   ECG taken at 140, ECG read at 0228  Sinus tachycardia   Nonspecific sorethroat and t wave abnormality   Abnormal ECG    Rate 103 bpm. IA interval 168 ms. QRS duration 82 ms. QT/QTc 348/455 ms. P-R-T axes 24 -9 79.      Independent Interpretation   None    ED Course      Medications Administered   Medications   sodium chloride 0.9% BOLUS 500 mL (0 mLs Intravenous Stopped 3/12/25 0337)       Procedures   Procedures     Discussion of Management   None    ED Course   ED Course as of 03/12/25 0842   Wed Mar 12, 2025   [id number] I obtained the history and examined the patient as noted above.          Additional Documentation  None    Medical Decision Making / Diagnosis     CMS Diagnoses:  None    MIPS       None    [facility]   Gilbert Ferrer is a 79 year old male who presents to the ED with vomiting and diarrhea.  The patient has mild dehydration.  He was treated with IV fluid bolus and felt much better.  The patient believes he was exposed to his grandchild who was ill with similar symptoms.  Norovirus is on the differential.  The patient will continue to push oral fluids and return to the ER with worsening symptoms.  No indication at this point for higher level imaging.    Disposition   The patient was discharged.     Diagnosis     ICD-10-CM    1. Vomiting and diarrhea  R11.10     R19.7       2. Dehydration  E86.0            Discharge Medications   Discharge Medication List as of 3/12/2025  3:42 AM            Scribe Disclosure:  IRosa Maria, am serving as a scribe at 2:49 AM on 3/12/2025 to document services personally performed by Arnel Salazar MD based on my observations and the provider's statements to me.        Arnel Salazar MD  03/12/25 0878

## 2025-03-14 ENCOUNTER — TELEPHONE (OUTPATIENT)
Dept: CARDIOLOGY | Facility: CLINIC | Age: 80
End: 2025-03-14
Payer: COMMERCIAL

## 2025-03-14 NOTE — TELEPHONE ENCOUNTER
Team received phone message today from Gloria and Gilbert.   His BP has been going up even higher.   Now running in the 150 - 160 over 90 -100 range.    Gilbert was in the ER on 3\12 with flu-like symptoms, but because of dehydration and vomitting\diarrhea, which was treated with fluids.    \109, 151\99, 146\86.    They would like Dr. Hollis to weigh in about increasing the BP medications.    Routing to Dr. Hollis, including the information from their call to us earlier in the week which we did not hear back from them until today.  Gilbert has been having 160\90 readings for a couple weeks now, off and on.    Anamika Holland RN on 3/14/2025 at 4:14 PM

## 2025-03-18 ENCOUNTER — TELEPHONE (OUTPATIENT)
Dept: CARDIOLOGY | Facility: CLINIC | Age: 80
End: 2025-03-18
Payer: COMMERCIAL

## 2025-03-18 NOTE — TELEPHONE ENCOUNTER
Writer has placed call to Gloria (wife and caregiver) for an update on what Gilbert's blood pressure is doing now.    LVM, asked her to call back to [phone].    Anamika Holland RN on 3/18/2025 at 2:18 PM

## 2025-06-03 ENCOUNTER — TELEPHONE (OUTPATIENT)
Dept: CARDIOLOGY | Facility: CLINIC | Age: 80
End: 2025-06-03
Payer: COMMERCIAL

## 2025-06-03 NOTE — TELEPHONE ENCOUNTER
Message received from [employer] regarding Gilbert's blood showing consistent elevations >150 systolic  Attempted to call but no answer- left message to call Team 3 phone at [phone]  to further discuss.   At last OV Dr. Hollis recommended restarting amlodipine, but patient declined, so plan was to monitor BP instead and contact clinic if SBP >150 consistently    Will call patient's wife tomorrow if no callback yet this evening.   Timoteo Baker RN on 6/3/2025 at 4:48 PM

## 2025-06-04 NOTE — TELEPHONE ENCOUNTER
"At last OV plan was for permissive HTN as long as SBP remains 150 or below.     Spoke with patient's wife  SBP consistently 150-155. Up to 160 on occasion   Diastolic 76-88  No symptomatic complaints from patient other than ongoing fatigue. Did have one episode he told wife \"my heart just seems off\" but has sine resolved and unable to elaborate. Unsure if he had an episode of afib or not- no current concern  Advised PCP for ongoing complaints of fatigue.     Last OV patient was encouraged to restart amlodipine- stopped thinking it was causing fatigue. Has not restarted     Please advise if amlodipine 2.5 mg daily should be restarted, if continued monitoring is advised, or if other recommendation.    Thank you,   Timoteo ROMERO RN       "

## 2025-06-10 RX ORDER — AMLODIPINE BESYLATE 2.5 MG/1
2.5 TABLET ORAL DAILY
COMMUNITY
Start: 2025-06-10 | End: 2025-06-11

## 2025-06-10 NOTE — TELEPHONE ENCOUNTER
Ip, Gee Coley MD to Me (Selected Message)    6/10/25  7:55 AM  Yes restart.  Thanks.    Message sent to patient and wife  Timoteo Baker RN on 6/10/2025 at 8:26 AM

## 2025-06-11 DIAGNOSIS — I10 BENIGN ESSENTIAL HYPERTENSION: Primary | ICD-10-CM

## 2025-06-11 DIAGNOSIS — I48.91 ATRIAL FIBRILLATION WITH RVR (H): ICD-10-CM

## 2025-06-11 RX ORDER — AMLODIPINE BESYLATE 2.5 MG/1
2.5 TABLET ORAL DAILY
Qty: 90 TABLET | Refills: 3 | Status: SHIPPED | OUTPATIENT
Start: 2025-06-11

## 2025-06-11 NOTE — PROGRESS NOTES
Spoke with patient and wife and updated regarding plans for restarting amlodipine at 2.5 mg daily  New prescription sent     Will update clinic in 1-2 weeks with BP readings  Timoteo Baker RN on 6/11/2025 at 2:18 PM

## 2025-06-11 NOTE — TELEPHONE ENCOUNTER
Spoke with patient and wife  Agreeable to start amlodipine  Will update clinic with BP's in 1-2 weeks     Timoteo Baker RN on 6/11/2025 at 3:51 PM

## 2025-06-26 ENCOUNTER — TELEPHONE (OUTPATIENT)
Dept: CARDIOLOGY | Facility: CLINIC | Age: 80
End: 2025-06-26
Payer: COMMERCIAL

## 2025-06-26 NOTE — TELEPHONE ENCOUNTER
"Spoke with patient's wife.   Patient doing well since restarting amlodipine.   -130's systolic  However does feel \"off\" when lowe into 120 range  Wife has been cutting dose in 1/2 due to this    Advised Gloria to update us in 1-2 weeks with readings and dose.   Patient to follow-up as needed this year an annually in Feb.     Timoteo Baker RN on 6/26/2025 at 12:47 PM     "

## 2025-07-14 ENCOUNTER — TELEPHONE (OUTPATIENT)
Dept: CARDIOLOGY | Facility: CLINIC | Age: 80
End: 2025-07-14
Payer: COMMERCIAL

## 2025-07-14 DIAGNOSIS — I48.91 ATRIAL FIBRILLATION WITH RVR (H): ICD-10-CM

## 2025-07-14 DIAGNOSIS — I10 BENIGN ESSENTIAL HYPERTENSION: ICD-10-CM

## 2025-07-14 NOTE — TELEPHONE ENCOUNTER
"Called patient for BP update.   Last time writer spoke with wife she reported the following:   Patient doing well since restarting amlodipine.   -130's systolic  However does feel \"off\" when lower into 120 range  Wife has been cutting dose in 1/2 due to this    LM for callback to [facility] 3 phone at [phone]  to discuss blood pressures and current dose of amlodipine.     Timoteo Baker RN on 7/14/2025 at 1:11 PM   "

## 2025-07-15 NOTE — TELEPHONE ENCOUNTER
130/70-80's. Highest was one time reading of 150/80     This morning 7/15: 136/81    Patient taking 1/2 of 2.5 mg amlodipine tablet.     BP's consistently <150 systollic     Doing well on dose   Wife will monitor and update staff if BP's trend up. Is aware patient should then take full tablet and monitor    Timoteo Baker RN on 7/15/2025 at 10:45 AM

## 2025-08-17 ENCOUNTER — APPOINTMENT (OUTPATIENT)
Dept: GENERAL RADIOLOGY | Facility: CLINIC | Age: 80
End: 2025-08-17
Attending: EMERGENCY MEDICINE
Payer: COMMERCIAL

## 2025-08-17 ENCOUNTER — HOSPITAL ENCOUNTER (EMERGENCY)
Facility: CLINIC | Age: 80
Discharge: HOME | End: 2025-08-17
Attending: EMERGENCY MEDICINE
Payer: COMMERCIAL

## 2025-08-17 VITALS
SYSTOLIC BLOOD PRESSURE: 151 MMHG | TEMPERATURE: 98.6 F | HEART RATE: 89 BPM | DIASTOLIC BLOOD PRESSURE: 81 MMHG | HEIGHT: 70 IN | BODY MASS INDEX: 29.03 KG/M2 | RESPIRATION RATE: 16 BRPM | OXYGEN SATURATION: 97 % | WEIGHT: 202.8 LBS

## 2025-08-17 DIAGNOSIS — J45.31 MILD PERSISTENT ASTHMA WITH ACUTE EXACERBATION: ICD-10-CM

## 2025-08-17 DIAGNOSIS — J40 BRONCHITIS: Primary | ICD-10-CM

## 2025-08-17 LAB
ALBUMIN SERPL BCG-MCNC: 3.9 G/DL (ref 3.5–5.2)
ALP SERPL-CCNC: 113 U/L (ref 40–150)
ALT SERPL W P-5'-P-CCNC: 96 U/L (ref 0–70)
ANION GAP SERPL CALCULATED.3IONS-SCNC: 11 MMOL/L (ref 7–15)
AST SERPL W P-5'-P-CCNC: 68 U/L (ref 0–45)
ATRIAL RATE - MUSE: 82 BPM
BASOPHILS # BLD AUTO: 0.05 10E3/UL (ref 0–0.2)
BASOPHILS NFR BLD AUTO: 0.6 %
BILIRUB SERPL-MCNC: 2.2 MG/DL
BUN SERPL-MCNC: 20.3 MG/DL (ref 8–23)
BURR CELLS BLD QL SMEAR: SLIGHT
CALCIUM SERPL-MCNC: 8.9 MG/DL (ref 8.8–10.4)
CHLORIDE SERPL-SCNC: 101 MMOL/L (ref 98–107)
CREAT SERPL-MCNC: 1.07 MG/DL (ref 0.67–1.17)
DIASTOLIC BLOOD PRESSURE - MUSE: NORMAL MMHG
EGFRCR SERPLBLD CKD-EPI 2021: 71 ML/MIN/1.73M2
EOSINOPHIL # BLD AUTO: 0.11 10E3/UL (ref 0–0.7)
EOSINOPHIL NFR BLD AUTO: 1.3 %
ERYTHROCYTE [DISTWIDTH] IN BLOOD BY AUTOMATED COUNT: 13.2 % (ref 10–15)
FLUAV RNA SPEC QL NAA+PROBE: NEGATIVE
FLUBV RNA RESP QL NAA+PROBE: NEGATIVE
FRAGMENTS BLD QL SMEAR: ABNORMAL
GLUCOSE SERPL-MCNC: 106 MG/DL (ref 70–99)
HCO3 SERPL-SCNC: 26 MMOL/L (ref 22–29)
HCT VFR BLD AUTO: 44.5 % (ref 40–53)
HGB BLD-MCNC: 15.6 G/DL (ref 13.3–17.7)
HOLD SPECIMEN: NORMAL
IMM GRANULOCYTES # BLD: 0.17 10E3/UL
IMM GRANULOCYTES NFR BLD: 2.1 %
INTERPRETATION ECG - MUSE: NORMAL
LYMPHOCYTES # BLD AUTO: 2.33 10E3/UL (ref 0.8–5.3)
LYMPHOCYTES NFR BLD AUTO: 28.1 %
MCH RBC QN AUTO: 32 PG (ref 26.5–33)
MCHC RBC AUTO-ENTMCNC: 35.1 G/DL (ref 31.5–36.5)
MCV RBC AUTO: 91.2 FL (ref 78–100)
MONOCYTES # BLD AUTO: 0.77 10E3/UL (ref 0–1.3)
MONOCYTES NFR BLD AUTO: 9.3 %
NEUTROPHILS # BLD AUTO: 4.86 10E3/UL (ref 1.6–8.3)
NEUTROPHILS NFR BLD AUTO: 58.6 %
NRBC # BLD AUTO: <0.03 10E3/UL
NRBC BLD AUTO-RTO: 0 /100
NT-PROBNP SERPL-MCNC: 115 PG/ML (ref 0–852)
P AXIS - MUSE: 32 DEGREES
PLAT MORPH BLD: ABNORMAL
PLATELET # BLD AUTO: 147 10E3/UL (ref 150–450)
POTASSIUM SERPL-SCNC: 3.4 MMOL/L (ref 3.4–5.3)
PR INTERVAL - MUSE: 168 MS
PROT SERPL-MCNC: 6.5 G/DL (ref 6.4–8.3)
QRS DURATION - MUSE: 84 MS
QT - MUSE: 366 MS
QTC - MUSE: 427 MS
R AXIS - MUSE: 26 DEGREES
RBC # BLD AUTO: 4.88 10E6/UL (ref 4.4–5.9)
RBC MORPH BLD: ABNORMAL
RSV RNA SPEC NAA+PROBE: NEGATIVE
SARS-COV-2 RNA RESP QL NAA+PROBE: NEGATIVE
SMUDGE CELLS BLD QL SMEAR: PRESENT
SODIUM SERPL-SCNC: 138 MMOL/L (ref 135–145)
SYSTOLIC BLOOD PRESSURE - MUSE: NORMAL MMHG
T AXIS - MUSE: 56 DEGREES
TROPONIN T SERPL HS-MCNC: 14 NG/L
VENTRICULAR RATE- MUSE: 82 BPM
WBC # BLD AUTO: 8.29 10E3/UL (ref 4–11)

## 2025-08-17 PROCEDURE — 250N000012 HC RX MED GY IP 250 OP 636 PS 637: Performed by: EMERGENCY MEDICINE

## 2025-08-17 PROCEDURE — 250N000013 HC RX MED GY IP 250 OP 250 PS 637: Performed by: EMERGENCY MEDICINE

## 2025-08-17 PROCEDURE — 83880 ASSAY OF NATRIURETIC PEPTIDE: CPT | Performed by: EMERGENCY MEDICINE

## 2025-08-17 PROCEDURE — 85004 AUTOMATED DIFF WBC COUNT: CPT | Performed by: EMERGENCY MEDICINE

## 2025-08-17 PROCEDURE — 84484 ASSAY OF TROPONIN QUANT: CPT | Performed by: EMERGENCY MEDICINE

## 2025-08-17 PROCEDURE — 36415 COLL VENOUS BLD VENIPUNCTURE: CPT | Performed by: EMERGENCY MEDICINE

## 2025-08-17 PROCEDURE — 87637 SARSCOV2&INF A&B&RSV AMP PRB: CPT | Performed by: EMERGENCY MEDICINE

## 2025-08-17 PROCEDURE — 71046 X-RAY EXAM CHEST 2 VIEWS: CPT

## 2025-08-17 PROCEDURE — 93005 ELECTROCARDIOGRAM TRACING: CPT

## 2025-08-17 PROCEDURE — 99285 EMERGENCY DEPT VISIT HI MDM: CPT | Mod: 25 | Performed by: EMERGENCY MEDICINE

## 2025-08-17 PROCEDURE — 80053 COMPREHEN METABOLIC PANEL: CPT | Performed by: EMERGENCY MEDICINE

## 2025-08-17 PROCEDURE — 87798 DETECT AGENT NOS DNA AMP: CPT | Performed by: EMERGENCY MEDICINE

## 2025-08-17 RX ORDER — AZITHROMYCIN 250 MG/1
500 TABLET, FILM COATED ORAL ONCE
Status: COMPLETED | OUTPATIENT
Start: 2025-08-17 | End: 2025-08-17

## 2025-08-17 RX ORDER — PREDNISONE 20 MG/1
40 TABLET ORAL ONCE
Status: COMPLETED | OUTPATIENT
Start: 2025-08-17 | End: 2025-08-17

## 2025-08-17 RX ORDER — PREDNISONE 20 MG/1
TABLET ORAL
Qty: 5 TABLET | Refills: 0 | Status: SHIPPED | OUTPATIENT
Start: 2025-08-17 | End: 2025-08-24

## 2025-08-17 RX ORDER — IPRATROPIUM BROMIDE AND ALBUTEROL SULFATE 2.5; .5 MG/3ML; MG/3ML
1 SOLUTION RESPIRATORY (INHALATION) EVERY 4 HOURS PRN
Qty: 360 ML | Refills: 0 | Status: SHIPPED | OUTPATIENT
Start: 2025-08-17

## 2025-08-17 RX ORDER — AZITHROMYCIN 250 MG/1
TABLET, FILM COATED ORAL
Qty: 6 TABLET | Refills: 0 | Status: SHIPPED | OUTPATIENT
Start: 2025-08-17 | End: 2025-08-22

## 2025-08-17 RX ADMIN — AZITHROMYCIN DIHYDRATE 500 MG: 250 TABLET ORAL at 17:54

## 2025-08-17 RX ADMIN — PREDNISONE 40 MG: 20 TABLET ORAL at 17:54

## 2025-08-17 ASSESSMENT — ACTIVITIES OF DAILY LIVING (ADL)
ADLS_ACUITY_SCORE: 41
ADLS_ACUITY_SCORE: 41

## 2025-08-17 ASSESSMENT — COLUMBIA-SUICIDE SEVERITY RATING SCALE - C-SSRS
6. HAVE YOU EVER DONE ANYTHING, STARTED TO DO ANYTHING, OR PREPARED TO DO ANYTHING TO END YOUR LIFE?: NO
1. IN THE PAST MONTH, HAVE YOU WISHED YOU WERE DEAD OR WISHED YOU COULD GO TO SLEEP AND NOT WAKE UP?: NO
2. HAVE YOU ACTUALLY HAD ANY THOUGHTS OF KILLING YOURSELF IN THE PAST MONTH?: NO

## 2025-08-18 LAB
B PARAPERT DNA SPEC QL NAA+PROBE: NOT DETECTED
B PERT DNA SPEC QL NAA+PROBE: NOT DETECTED

## (undated) DEVICE — EYE KNIFE SLIT XSTAR VISITEC 2.6MM 45DEG 373726

## (undated) DEVICE — PACK CATARACT CUSTOM SO DALE SEY32CTFCX

## (undated) DEVICE — EYE PACK CUSTOM ANTERIOR 30DEG TIP CENTURION PPK6682-04

## (undated) DEVICE — LINEN TOWEL PACK X5 5464

## (undated) DEVICE — GLOVE PROTEXIS MICRO 7.0  2D73PM70

## (undated) DEVICE — EYE SHIELD PLASTIC

## (undated) DEVICE — TAPE MICROPORE 2"X1.5YD 1530S-2

## (undated) DEVICE — EYE SOL BSS 500ML

## (undated) DEVICE — GLOVE PROTEXIS MICRO 6.5  2D73PM65

## (undated) DEVICE — EYE PACK BVI READYPAK KIT #3

## (undated) DEVICE — GLOVE PROTEXIS W/NEU-THERA 8.0  2D73TE80

## (undated) DEVICE — GLOVE PROTEXIS MICRO 6.0  2D73PM60

## (undated) RX ORDER — FENTANYL CITRATE 50 UG/ML
INJECTION, SOLUTION INTRAMUSCULAR; INTRAVENOUS
Status: DISPENSED
Start: 2017-07-26

## (undated) RX ORDER — LIDOCAINE HYDROCHLORIDE 20 MG/ML
INJECTION, SOLUTION EPIDURAL; INFILTRATION; INTRACAUDAL; PERINEURAL
Status: DISPENSED
Start: 2017-07-26

## (undated) RX ORDER — ONDANSETRON 2 MG/ML
INJECTION INTRAMUSCULAR; INTRAVENOUS
Status: DISPENSED
Start: 2017-07-26

## (undated) RX ORDER — LIDOCAINE HYDROCHLORIDE 10 MG/ML
INJECTION, SOLUTION EPIDURAL; INFILTRATION; INTRACAUDAL; PERINEURAL
Status: DISPENSED
Start: 2017-07-26

## (undated) RX ORDER — POTASSIUM CHLORIDE 1500 MG/1
TABLET, EXTENDED RELEASE ORAL
Status: DISPENSED
Start: 2023-07-13